# Patient Record
Sex: FEMALE | Race: BLACK OR AFRICAN AMERICAN | Employment: FULL TIME | URBAN - METROPOLITAN AREA
[De-identification: names, ages, dates, MRNs, and addresses within clinical notes are randomized per-mention and may not be internally consistent; named-entity substitution may affect disease eponyms.]

---

## 2017-03-15 ENCOUNTER — TELEPHONE (OUTPATIENT)
Dept: CARDIOLOGY CLINIC | Age: 32
End: 2017-03-15

## 2017-03-15 NOTE — TELEPHONE ENCOUNTER
Called patient. Verified patient's identity with two identifiers. I told patient last office note includes diagnosis codes and medications. She said that was fine. Mailing Dr. Paul Hernandez last office note. Patient verbalizes understanding and denies further questions or concerns.

## 2017-03-15 NOTE — TELEPHONE ENCOUNTER
The patient requested a call back on [phone] regarding a note being sent to her employer stating her medical condition and the medication that she is currently taking. She stated that it can be sent to her address on file. Thanks!

## 2017-03-20 ENCOUNTER — TELEPHONE (OUTPATIENT)
Dept: CARDIOLOGY CLINIC | Age: 32
End: 2017-03-20

## 2017-03-20 NOTE — TELEPHONE ENCOUNTER
Patient called regarding picking up a letter for absence from work. Please call her when she can pick that up. She can be reached at [phone].  Thank you

## 2017-03-20 NOTE — TELEPHONE ENCOUNTER
I created a letter. Patient sent a [employer] software[employer] message saying she was doing to stop in the office today to pick it up. Patient picked up letter.

## 2017-03-28 ENCOUNTER — OFFICE VISIT (OUTPATIENT)
Dept: CARDIOLOGY CLINIC | Age: 32
End: 2017-03-28

## 2017-03-28 DIAGNOSIS — Z95.810 PRESENCE OF AUTOMATIC CARDIOVERTER/DEFIBRILLATOR (AICD): Primary | ICD-10-CM

## 2017-06-05 DIAGNOSIS — R00.2 PALPITATIONS: ICD-10-CM

## 2017-06-05 DIAGNOSIS — I10 ESSENTIAL HYPERTENSION: ICD-10-CM

## 2017-06-05 DIAGNOSIS — I10 ESSENTIAL HYPERTENSION WITH GOAL BLOOD PRESSURE LESS THAN 130/80: ICD-10-CM

## 2017-06-05 DIAGNOSIS — I42.0 CARDIOMYOPATHY, DILATED, NONISCHEMIC (HCC): ICD-10-CM

## 2017-06-05 RX ORDER — CARVEDILOL 6.25 MG/1
6.25 TABLET ORAL 2 TIMES DAILY WITH MEALS
Qty: 90 TAB | Refills: 2 | Status: CANCELLED | OUTPATIENT
Start: 2017-06-05

## 2017-06-06 RX ORDER — LOSARTAN POTASSIUM 100 MG/1
TABLET ORAL
Qty: 90 TAB | Refills: 2 | Status: SHIPPED | OUTPATIENT
Start: 2017-06-06 | End: 2018-06-29 | Stop reason: SDUPTHER

## 2017-06-06 RX ORDER — BUMETANIDE 2 MG/1
TABLET ORAL
Qty: 180 TAB | Refills: 2 | Status: SHIPPED | OUTPATIENT
Start: 2017-06-06 | End: 2018-06-29 | Stop reason: SDUPTHER

## 2017-06-06 RX ORDER — CARVEDILOL 25 MG/1
25 TABLET ORAL 2 TIMES DAILY WITH MEALS
Qty: 180 TAB | Refills: 2 | Status: SHIPPED | OUTPATIENT
Start: 2017-06-06 | End: 2018-06-29 | Stop reason: SDUPTHER

## 2017-06-06 NOTE — TELEPHONE ENCOUNTER
Requested Prescriptions     Signed Prescriptions Disp Refills    carvedilol (COREG) 25 mg tablet 180 Tab 2     Sig: Take 1 Tab by mouth two (2) times daily (with meals).      Authorizing Provider: Yannick Dunbar     Ordering User: Edil Hernandez losartan (COZAAR) 100 mg tablet 90 Tab 2     Sig: TAKE 1 TABLET BY MOUTH DAILY     Authorizing Provider: Yannick Dunbar     Ordering User: Rashida Serrano    bumetanide (BUMEX) 2 mg tablet 180 Tab 2     Sig: TAKE 1 TABLET BY MOUTH TWICE DAILY     Authorizing Provider: Yannick Dunbar     Ordering User: Rashida Serrano    Per Dr. Stephanie Ashton verbal order

## 2017-06-15 ENCOUNTER — DOCUMENTATION ONLY (OUTPATIENT)
Dept: CARDIOLOGY CLINIC | Age: 32
End: 2017-06-15

## 2017-06-15 NOTE — PROGRESS NOTES
(Single lead ICD)Device check shows 39 episodes of SVT since 3/28/2017  Rates mostly 150's bpm. Highest rate 179 bpm  Longest episode 5 minutes 40 seconds  Shortest episodes 10 seconds    Coreg increased to 32.5 mg bid   May need to come in to discuss EP study and ablation     Future Appointments  Date Time Provider Shanae Alicia   7/5/2017 9:15 AM [facility], [address]   10/9/2017 3:15 PM [address] [place] [address], [address]   10/9/2017 3:20 PM Jean Paul Bartholomew  [address]

## 2017-06-19 NOTE — PROGRESS NOTES
Verified patient with two types of identifiers. States that she is having more increase in tachy and would like to see Dr Geovany Crowley to discuss the next option. Made appt with Dr Geovany Crowley 6/26/17. Patient verbalizes understanding. And will call with any questions or concerns.

## 2017-06-26 ENCOUNTER — OFFICE VISIT (OUTPATIENT)
Dept: CARDIOLOGY CLINIC | Age: 32
End: 2017-06-26

## 2017-06-26 VITALS
WEIGHT: 216.8 LBS | DIASTOLIC BLOOD PRESSURE: 90 MMHG | HEIGHT: 61 IN | HEART RATE: 60 BPM | BODY MASS INDEX: 40.93 KG/M2 | RESPIRATION RATE: 16 BRPM | SYSTOLIC BLOOD PRESSURE: 130 MMHG

## 2017-06-26 DIAGNOSIS — I42.0 CARDIOMYOPATHY, DILATED, NONISCHEMIC (HCC): ICD-10-CM

## 2017-06-26 DIAGNOSIS — Z95.810 SINGLE IMPLANTABLE CARDIOVERTER-DEFIBRILLATOR IN SITU: ICD-10-CM

## 2017-06-26 DIAGNOSIS — E66.01 OBESITY, CLASS III, BMI 40-49.9 (MORBID OBESITY) (HCC): ICD-10-CM

## 2017-06-26 DIAGNOSIS — I47.1 SVT (SUPRAVENTRICULAR TACHYCARDIA) (HCC): Primary | ICD-10-CM

## 2017-06-26 DIAGNOSIS — I10 ESSENTIAL HYPERTENSION: ICD-10-CM

## 2017-06-26 DIAGNOSIS — I34.0 MITRAL VALVE INSUFFICIENCY, UNSPECIFIED ETIOLOGY: ICD-10-CM

## 2017-06-26 DIAGNOSIS — R00.2 PALPITATIONS: ICD-10-CM

## 2017-06-26 NOTE — PROGRESS NOTES
Subjective:      Karley Avery is a 27 y.o. female who is seen today to discuss frequent episodes of SVT noted on her ICD. She says she sometimes feels her heart racing when it is going fast. She is already on the max dose of Coreg. She denies recently hospitalizations. She recently had labs done with her new PCP. Most recent device check shows (Single lead ICD) 39 episodes of SVT since 3/28/2017  Rates mostly 150's bpm. highest rate 179 bpm  Longest episode 5 minutes 40 seconds  Shortest episodes 10 seconds  These are not during time she exercises  She says she can do most things, just let her brother carry grocery    In the past:    She had ICD done because of syncope with cardiomyopathy  The patient was diagnosed with postpartum cardiomyopathy in 2008. She has been seen by Dr. Zuleima Euceda but wanted to change cardiology practice and has seen Dr. Mani Barnes. NYHA class II.    Echo 11/2014 EF 15%, moderate MR, [place].moderate to severe TR  She had ICD lead fractured (she was not compliant and followed up regularly at the time) and lead was extracted and reimplanted by me    Problem List  Date Reviewed: 6/26/2017          Codes Class Noted    ICD (implantable cardioverter-defibrillator) lead failure ICD-10-CM: T82.110A  ICD-9-CM: 996.04  2/2/2016    Overview Signed 2/3/2016  6:21 AM by Jinny Garcia MD     ICD lead extraction 2/2/2016  Reimplant new ICD lead  General anesthesia             Tricuspid inspiratory pansystolic murmur [id number]: I07.9  ICD-9-CM: 785.2  10/17/2013        Pulmonary hypertension ([address] ICD-10-CM: I27.2  ICD-9-CM: 416.8  10/17/2013        Headache ICD-10-CM: R51  ICD-9-CM: 784.0  4/17/2013        Abnormal EKG ICD-10-CM: R94.31  ICD-9-CM: 794.31  11/19/2012        Mitral regurgitation ICD-10-CM: I34.0  ICD-9-CM: 424.0  11/19/2012        BP (high blood pressure) ICD-10-CM: I10  ICD-9-CM: 401.9  7/30/2012        Single implantable cardioverter-defibrillator in situ ICD-10-CM: Z95.810  ICD-9-CM: V45.02  2/1/2012    Overview Signed 2/1/2012  7:00 AM by MD Vinnie Nunez John single lead single coil AICD implant 1/30/2012             Cardiomyopathy, dilated, nonischemic (HCC) ICD-10-CM: I42.9  ICD-9-CM: 425.4  Unknown        Morbid obesity ([address] ICD-10-CM: E66.01  ICD-9-CM: 278.01  1/17/2012        Observed sleep apnea ICD-10-CM: G47.30  ICD-9-CM: 780.57  1/17/2012        Mitral valve disorder ICD-10-CM: I05.9  ICD-9-CM: 394.9  1/6/2012        Palpitations ICD-10-CM: R00.2  ICD-9-CM: 785.1  12/22/2011        Syncope and collapse ICD-10-CM: R55  ICD-9-CM: 780.2  12/22/2011        Cardiomyopathy, peripartum, postpartum ICD-10-CM: O90.3  ICD-9-CM: 674.54  12/22/2011        Heart murmur ICD-10-CM: R01.1  ICD-9-CM: 785.2  12/22/2011                Current Outpatient Prescriptions   Medication Sig Dispense Refill    carvedilol (COREG) 25 mg tablet Take 1 Tab by mouth two (2) times daily (with meals). 180 Tab 2    losartan (COZAAR) 100 mg tablet TAKE 1 TABLET BY MOUTH DAILY 90 Tab 2    bumetanide (BUMEX) 2 mg tablet TAKE 1 TABLET BY MOUTH TWICE DAILY 180 Tab 2    carvedilol (COREG) 6.25 mg tablet Take 1 Tab by mouth two (2) times daily (with meals). 90 Tab 2    ferrous sulfate (IRON) 325 mg (65 mg iron) tablet Take  by mouth Daily (before breakfast).  b complex vitamins Liqd Take  by mouth daily.  multivitamin (ONE A DAY) tablet Take 1 Tab by mouth daily.  Calcium-Cholecalciferol, D3, (CALCIUM 600 WITH VITAMIN D3) 600 mg(1,500mg) -400 unit Chew Take  by mouth two (2) times a day.        Allergies   Allergen Reactions    Bee Sting [Sting, Bee] Anaphylaxis    Lisinopril Cough     Past Medical History:   Diagnosis Date    Arrhythmia     BRADYCARDIA    Cardiomyopathy, dilated, nonischemic (HCC)     Congestive heart failure, unspecified     Essential hypertension     [facility] failure [facility]     Hypertension     ICD (implantable cardiac defibrillator) in place     [facility] dual coil, single lead    Murmur     Syncope     Syncope and collapse 2011    TIA (transient ischemic attack)     Valvular heart disease      Past Surgical History:   Procedure Laterality Date    HX  SECTION      HX CHOLECYSTECTOMY      HX GASTRIC BYPASS      HX PACEMAKER      DEFIBRILLATOR    INS PPM/ICD LED SING ONLY  2012         BELLO DOPPLER  2013          No family history of sudden death. Social History   Substance Use Topics    Smoking status: Never Smoker    Smokeless tobacco: Never Used    Alcohol use No      Comment: RARE OCC      Review of Systems  Constitutional: Negative for fever, chills   HEENT: Negative for nosebleeds, acute vision changes. Respiratory: Negative for cough, hemoptysis, sputum production, and wheezing. Cardiovascular: Negative for orthopnea, claudication, PND. + occasional palpitations. Gastrointestinal: Negative for nausea, vomiting, blood in stool and melena. Genitourinary: Negative for dysuria, hematuria. Musculoskeletal: Negative for myalgias. Skin: Negative for rash and itching. Heme: Does not bleed or bruise easily. Neurological: Negative for speech change and focal weakness      Objective:     Visit Vitals    /90 (BP 1 Location: Left arm, BP Patient Position: Sitting)    Pulse 60    Resp 16    Ht 5' 1\" (1.549 m)    Wt 216 lb 12.8 oz (98.3 kg)    BMI 40.96 kg/m2      Physical Exam:   General:  alert, cooperative, no distress, appears stated age    Neck: carotids upstroke normal bilaterally, no bruits, no JVD   Chest Wall: inspection show large breasts and upper body fat, respiratory effort normal   Lung: clear to auscultation bilaterally   Heart:  normal rate and regular rhythm, hard to hear murmur, large breasts   Abdomen: Soft, moderate obesity   Extremities: No leg edema             Skin: ICD site on the left shows keloid scar, no redness,          Assessment/Plan:       ICD-10-CM ICD-9-CM    1.  SVT (supraventricular tachycardia) ([facility]) I47.1 427.89    2. Palpitations R00.2 785.1    3. Cardiomyopathy, dilated, nonischemic ([facility]) I42.9 425.4    4. Single implantable cardioverter-defibrillator in situ Z95.810 V45.02    5. Mitral valve insufficiency, unspecified etiology I34.0 424.0    6. Essential hypertension I10 401.9    7. Obesity, Class III, BMI 40-49.9 (morbid obesity) ([facility]) E66.01 278.01       Reviewed device check findings with her. Discussed EP study ablation and SVT ablation, right now she says she is not that symptomatic and will hold off on the EP study for right now. If she does become more symptomatic then she will set up the EP study and ablation. Last year she had mildly abnormal TSH but normal T4. I would like to recheck it but she said call her PCP's office to see if he has done one  She is on GDMT, BP is tolerating the increased dose of Coreg. reviewed diet, exercise and weight control  reviewed medications and side effects in detail  She is working out, her goal is to lose another 30 lbs. Follow-up Disposition:  Return in about 6 months (around 12/26/2017). [place] Her M.D.  [facility]  Electrophysiology/Cardiology  [facility] [place]  [address] , [address]  [phone]

## 2017-06-26 NOTE — PROGRESS NOTES
Chief Complaint   Patient presents with    SVT     recently on remote. Occasional chest sharpness with elevated heart rate/dizziness. Denies shortness of breath/swelling. Labwork done per PCP.

## 2017-06-26 NOTE — PROGRESS NOTES
Subjective:      Ninfa Painter is a 27 y.o. female who is seen today to discuss frequent episodes of SVT noted on her ICD. She says she sometimes feels her heart racing when it is going fast. She is already on the max dose of Coreg. She denies recently hospitalizations. She recently had labs done with her new PCP. Most recent device check shows (Single lead ICD)Device check shows 39 episodes of SVT since 3/28/2017  Rates mostly 150's bpm. Highest rate 179 bpm  Longest episode 5 minutes 40 seconds  Shortest episodes 10 seconds      She had ICD done because of syncope with cardiomyopathy  The patient was diagnosed with postpartum cardiomyopathy in 2008. She has been seen by Dr. John Gomez but wanted to change cardiology practice and has seen Dr. Farzana Musa. NYHA class II.    Echo 11/2014 EF 15%, moderate MR, [place].moderate to severe TR  She had ICD lead fractured (she was not compliant and followed up regularly at the time) and lead was extracted and reimplanted by me            Problem List  Date Reviewed: 6/26/2017          Codes Class Noted    ICD (implantable cardioverter-defibrillator) lead failure ICD-10-CM: T82.110A  ICD-9-CM: 996.04  2/2/2016    Overview Signed 2/3/2016  6:21 AM by Montey Schirmer, MD     ICD lead extraction 2/2/2016  Reimplant new ICD lead  General anesthesia             Tricuspid inspiratory pansystolic murmur [id number]: I07.9  ICD-9-CM: 785.2  10/17/2013        Pulmonary hypertension ([address] ICD-10-CM: I27.2  ICD-9-CM: 416.8  10/17/2013        Headache ICD-10-CM: R51  ICD-9-CM: 784.0  4/17/2013        Abnormal EKG ICD-10-CM: R94.31  ICD-9-CM: 794.31  11/19/2012        Mitral regurgitation ICD-10-CM: I34.0  ICD-9-CM: 424.0  11/19/2012        BP (high blood pressure) ICD-10-CM: I10  ICD-9-CM: 401.9  7/30/2012        Single implantable cardioverter-defibrillator in situ ICD-10-CM: Z95.810  ICD-9-CM: V45.02  2/1/2012    Overview Signed 2/1/2012  7:00 AM by Montey Schirmer, MD [facility] single lead single coil AICD implant 1/30/2012             Cardiomyopathy, dilated, nonischemic (HCC) ICD-10-CM: I42.9  ICD-9-CM: 425.4  Unknown        Morbid obesity ([facility] ICD-10-CM: E66.01  ICD-9-CM: 278.01  1/17/2012        Observed sleep apnea ICD-10-CM: G47.30  ICD-9-CM: 780.57  1/17/2012        Mitral valve disorder ICD-10-CM: I05.9  ICD-9-CM: 394.9  1/6/2012        Palpitations ICD-10-CM: R00.2  ICD-9-CM: 785.1  12/22/2011        Syncope and collapse ICD-10-CM: R55  ICD-9-CM: 780.2  12/22/2011        Cardiomyopathy, peripartum, postpartum ICD-10-CM: O90.3  ICD-9-CM: 674.54  12/22/2011        Heart murmur ICD-10-CM: R01.1  ICD-9-CM: 785.2  12/22/2011                Current Outpatient Prescriptions   Medication Sig Dispense Refill    carvedilol (COREG) 25 mg tablet Take 1 Tab by mouth two (2) times daily (with meals). 180 Tab 2    losartan (COZAAR) 100 mg tablet TAKE 1 TABLET BY MOUTH DAILY 90 Tab 2    bumetanide (BUMEX) 2 mg tablet TAKE 1 TABLET BY MOUTH TWICE DAILY 180 Tab 2    carvedilol (COREG) 6.25 mg tablet Take 1 Tab by mouth two (2) times daily (with meals). 90 Tab 2    ferrous sulfate (IRON) 325 mg (65 mg iron) tablet Take  by mouth Daily (before breakfast).  b complex vitamins Liqd Take  by mouth daily.  multivitamin (ONE A DAY) tablet Take 1 Tab by mouth daily.  Calcium-Cholecalciferol, D3, (CALCIUM 600 WITH VITAMIN D3) 600 mg(1,500mg) -400 unit Chew Take  by mouth two (2) times a day.        Allergies   Allergen Reactions    Bee Sting [Sting, Bee] Anaphylaxis    Lisinopril Cough     Past Medical History:   Diagnosis Date    Arrhythmia     BRADYCARDIA    Cardiomyopathy, dilated, nonischemic (HCC)     Congestive heart failure, unspecified     Essential hypertension     Heart failure ([facility]     Hypertension     ICD (implantable cardiac defibrillator) in place     [facility] dual coil, single lead    Murmur     Syncope     Syncope and collapse 12/22/2011    TIA (transient ischemic attack)     Valvular heart disease      Past Surgical History:   Procedure Laterality Date    HX  SECTION      HX CHOLECYSTECTOMY      HX GASTRIC BYPASS      HX PACEMAKER      DEFIBRILLATOR    INS PPM/ICD LED SING ONLY  2012         BELLO DOPPLER  2013          No family history of sudden death. Social History   Substance Use Topics    Smoking status: Never Smoker    Smokeless tobacco: Never Used    Alcohol use No      Comment: RARE OCC      Review of Systems  Constitutional: Negative for fever, chills   HEENT: Negative for nosebleeds, acute vision changes. Respiratory: Negative for cough, hemoptysis, sputum production, and wheezing. Cardiovascular: Negative for orthopnea, claudication, PND. + occasional palpitations. Gastrointestinal: Negative for nausea, vomiting, blood in stool and melena. Genitourinary: Negative for dysuria, hematuria. Musculoskeletal: Negative for myalgias. Skin: Negative for rash and itching. Heme: Does not bleed or bruise easily. Neurological: Negative for speech change and focal weakness      Objective:     Visit Vitals    /90 (BP 1 Location: Left arm, BP Patient Position: Sitting)    Pulse 60    Resp 16    Ht 5' 1\" (1.549 m)    Wt 216 lb 12.8 oz (98.3 kg)    BMI 40.96 kg/m2      Physical Exam:   General:  alert, cooperative, no distress, appears stated age    Neck: carotids upstroke normal bilaterally, no bruits, no JVD   Chest Wall: inspection show large breasts and upper body fat, respiratory effort normal   Lung: clear to auscultation bilaterally   Heart:  normal rate and regular rhythm,   Abdomen: soft   Extremities: No Bilateral leg edema             Skin: ICD site on the left shows keloid scar, no redness,          Assessment/Plan:       ICD-10-CM ICD-9-CM    1. SVT (supraventricular tachycardia) ([facility]) I47.1 427.89    2. Palpitations R00.2 785.1    3. Cardiomyopathy, dilated, nonischemic ([facility]) I42.9 425.4    4.  Single implantable cardioverter-defibrillator in situ Z95.810 V45.02    5. Mitral valve insufficiency, unspecified etiology I34.0 424.0    6. Essential hypertension I10 401.9    7. Obesity, Class III, BMI 40-49.9 (morbid obesity) ([facility]) E66.01 278.01       Reviewed device check findings with her. Discussed EP study ablation and SVT ablation, right now she says she is not that symptomatic and will hold off on the EP study for right now. If she does become more symptomatic then she will set up the EP study and ablation. She is on GDMT, BP is tolerating the increased dose of Coreg. reviewed diet, exercise and weight control  reviewed medications and side effects in detail  She is working out, her goal is to lose another 30 lbs. Follow-up Disposition: Not on Karla Mujica M.D.  [facility]  Electrophysiology/Cardiology  [facility] [place]  [address] [facility]  [address] [facility], [address] [place] [address]  [phone]

## 2017-06-26 NOTE — MR AVS SNAPSHOT
Visit Information Date & Time Provider Department Dept. Phone Encounter #  
 6/26/2017  4:00 PM Hue Us MD [facility] [phone] [id number] Your Appointments 10/12/2017  2:15 PM  
PACEMAKER with CARLIN MARQUEZ [facility] ([place] SCHEDULING) Appt Note: sjm icd, rc annual thresh/M  see Sandoval  $0CP rosario 9/15/16; sjm icd, rc annual thresh/M see Sandoval $0CP rosario 9/15/16  
 [address]  
[address] [facility]  
  
    
 10/12/2017  2:20 PM  
ESTABLISHED PATIENT with Hue Us MD  
[facility] ([facility]) Appt Note: sjm icd, rc annual thresh/M see Sandoval $0CP rosario 9/15/16  
 [address] [address]  
[address] [place] [address]  
  
    
  
 7/5/2017  9:15 AM  
REMOTE OFFICE VISIT with Bruce Villarreal [facility] ([place] SCHEDULING) Appt Note: sjm icd, rc  
 [address]  
[address] [address] Upcoming Health Maintenance Date Due DTaP/Tdap/Td series (1 - Tdap) 8/24/2005 PAP AKA CERVICAL CYTOLOGY 8/24/2005 INFLUENZA AGE 9 TO ADULT 8/1/2017 Allergies as of 6/26/2017  Review Complete On: 6/26/2017 By: Hue Us MD  
  
 Severity Noted Reaction Type Reactions Bee Sting [Sting, Bee] High 11/01/2012    Anaphylaxis Lisinopril  01/25/2012   Side Effect Cough Current Immunizations  Reviewed on 2/2/2016 Name Date Influenza Vaccine Split 11/1/2011 Pneumococcal Vaccine (Unspecified Type) 11/1/2011 Not reviewed this visit You Were Diagnosed With   
  
 Codes Comments SVT (supraventricular tachycardia) ([facility]    -  Primary ICD-10-CM: I47.1 ICD-9-CM: 427.89   
 Palpitations     ICD-10-CM: R00.2 ICD-9-CM: 785.1 Cardiomyopathy, dilated, nonischemic (HCC)     ICD-10-CM: I42.9 ICD-9-CM: 425.4 Single implantable cardioverter-defibrillator in situ     ICD-10-CM: Z95.810 ICD-9-CM: V45.02 Mitral valve insufficiency, unspecified etiology     ICD-10-CM: I34.0 ICD-9-CM: 424.0 Essential hypertension     ICD-10-CM: I10 
ICD-9-CM: 401.9 Obesity, Class III, BMI 40-49.9 (morbid obesity) (HCC)     ICD-10-CM: E66.01 
ICD-9-CM: 278.01 Vitals BP Pulse Resp Height(growth percentile) Weight(growth percentile) BMI  
 130/90 (BP 1 Location: Left arm, BP Patient Position: Sitting) 60 16 5' 1\" (1.549 m) 216 lb 12.8 oz (98.3 kg) 40.96 kg/m2 OB Status Smoking Status IUD Never Smoker BMI and BSA Data Body Mass Index Body Surface Area 40.96 kg/m 2 2.06 m 2 Preferred Pharmacy Pharmacy Name Phone [place] [phone] [facility], [address] [place] [address] [phone] Your Updated Medication List  
  
   
This list is accurate as of: 6/26/17  5:26 PM.  Always use your most recent med list.  
  
  
  
  
 b complex vitamins Liqd Take  by mouth daily. bumetanide 2 mg tablet Commonly known as:  Torres Abide TAKE 1 TABLET BY MOUTH TWICE DAILY CALCIUM 600 WITH VITAMIN D3 600 mg(1,500mg) -400 unit Chew Generic drug:  Calcium-Cholecalciferol (D3) Take  by mouth two (2) times a day. * carvedilol 6.25 mg tablet Commonly known as:  Esaw Math Take 1 Tab by mouth two (2) times daily (with meals). * carvedilol 25 mg tablet Commonly known as:  Esaw Math Take 1 Tab by mouth two (2) times daily (with meals). Iron 325 mg (65 mg iron) tablet Generic drug:  ferrous sulfate Take  by mouth Daily (before breakfast). losartan 100 mg tablet Commonly known as:  COZAAR  
TAKE 1 TABLET BY MOUTH DAILY  
  
 multivitamin tablet Commonly known as:  ONE A DAY  
 Take 1 Tab by mouth daily. * Notice: This list has 2 medication(s) that are the same as other medications prescribed for you. Read the directions carefully, and ask your doctor or other care provider to review them with you. Introducing [facility] SERVICES! Dear Linda: 
Thank you for requesting a [employer] account. Our records indicate that you already have an active [employer] account. You can access your account anytime at [email or web address] Did you know that you can access your hospital and ER discharge instructions at any time in [employer]? You can also review all of your test results from your hospital stay or ER visit. Additional Information If you have questions, please visit the Frequently Asked Questions section of the [employer] website at [email or web address] Remember, [employer] is NOT to be used for urgent needs. For medical emergencies, dial 911. Now available from your iPhone and Android! Please provide this summary of care documentation to your next provider. Your primary care clinician is listed as Dany Bahena [facility]. If you have any questions after today's visit, please call [phone].

## 2017-06-26 NOTE — LETTER
NOTIFICATION RETURN TO WORK / SCHOOL 
 
6/26/2017 5:29 PM 
 
Ms. Brian Gary [address] [facility] [address] To Whom It May Concern: 
 
Brian Gary is currently under the care of [address] Nicolette Issa at our office for an appointment: 6/26/17 If there are questions or concerns please have the patient contact our office.  
 
 
 
Sincerely, 
 
 
Jeffry Sandoval MD

## 2017-06-27 ENCOUNTER — TELEPHONE (OUTPATIENT)
Dept: CARDIOLOGY CLINIC | Age: 32
End: 2017-06-27

## 2017-06-29 ENCOUNTER — TELEPHONE (OUTPATIENT)
Dept: CARDIOLOGY CLINIC | Age: 32
End: 2017-06-29

## 2017-06-29 DIAGNOSIS — R94.31 ABNORMAL EKG: ICD-10-CM

## 2017-06-29 DIAGNOSIS — I05.9 MITRAL VALVE DISORDER: ICD-10-CM

## 2017-06-29 NOTE — TELEPHONE ENCOUNTER
----- Message from Siddharth Calabrese NP sent at 6/28/2017  8:27 AM EDT -----  Labs 6/24/17  Normal renal function  Normal LFTs  K WnL  LDL 79    Please order thyroid panel, hx of abnormal TSH

## 2017-06-29 NOTE — LETTER
7/31/2017 8:32 AM 
 
Ms. Karley Avery [address] [facility] [address] Dear Karley Avery: 
 
Please find your most recent results below. Resulted Orders THYROID PANEL W/TSH Result Value Ref Range TSH 2.090 0.450 - 4.500 uIU/mL T4, Total 6.3 4.5 - 12.0 ug/dL  
 T3 Uptake 30 24 - 39 % Free Thyroxine Index 1.9 1.2 - 4.9 Narrative Performed at:  [address]  [id number] : Zak Marshall MD, Phone:  [phone] RECOMMENDATIONS: 
 
 
Please call me if you have any questions: [phone] Sincerely,

## 2017-07-05 ENCOUNTER — OFFICE VISIT (OUTPATIENT)
Dept: CARDIOLOGY CLINIC | Age: 32
End: 2017-07-05

## 2017-07-05 DIAGNOSIS — Z95.810 PRESENCE OF AUTOMATIC CARDIOVERTER/DEFIBRILLATOR (AICD): Primary | ICD-10-CM

## 2017-07-26 LAB
FT4I SERPL CALC-MCNC: 1.9 (ref 1.2–4.9)
T3RU NFR SERPL: 30 % (ref 24–39)
T4 SERPL-MCNC: 6.3 UG/DL (ref 4.5–12)
TSH SERPL DL<=0.005 MIU/L-ACNC: 2.09 UIU/ML (ref 0.45–4.5)

## 2017-07-31 ENCOUNTER — TELEPHONE (OUTPATIENT)
Dept: CARDIOLOGY CLINIC | Age: 32
End: 2017-07-31

## 2017-07-31 NOTE — TELEPHONE ENCOUNTER
Called patient. Verified patient's identity with two identifiers. Notified patient thyroid labs were normal. She asked what the next steps are. She mentioned having discussed procedure (which I see an ablation was discussed). I told her I would ask Dr. Jus Larry nurse and call her back. Her f/u with Dr. Mckenzie Fine is not until October, but can make an earlier appointment if you think it's necessary. Please advise. Thanks.     Future Appointments  Date Time Provider Shanae Vargas   10/12/2017 2:15 PM [address] [place] [address], [address] [facility]   10/12/2017 2:20 PM Keshawn Alvarez  [address]

## 2017-07-31 NOTE — TELEPHONE ENCOUNTER
Called patient. Notified her of Ivis's message. Patient verbalizes understanding and denies further questions or concerns.

## 2017-07-31 NOTE — TELEPHONE ENCOUNTER
Follow up as scheduled unless she becomes more symptomatic then she can set up the EP study and ablation.

## 2018-01-04 ENCOUNTER — TELEPHONE (OUTPATIENT)
Dept: CARDIOLOGY CLINIC | Age: 33
End: 2018-01-04

## 2018-01-04 NOTE — TELEPHONE ENCOUNTER
Spoke to Dr Damien Gonzalez in regards to [facility] paperwork. Patient states that she needs the paper work filled out due to days she has issues with side effects from her medications. Advised patient that we do not do FMLA for this. Per Dr Damien Gonzalez he will need to see to discuss. Will discuss at her upcoming appt 2/15/18. Patient also has appointment with Dr Daxa Villa 1/10/18 who can also fill out if he feels she needs this. My chart message sent to patient advising her of this.

## 2018-01-10 ENCOUNTER — TELEPHONE (OUTPATIENT)
Dept: CARDIOLOGY CLINIC | Age: 33
End: 2018-01-10

## 2018-01-10 ENCOUNTER — OFFICE VISIT (OUTPATIENT)
Dept: CARDIOLOGY CLINIC | Age: 33
End: 2018-01-10

## 2018-01-10 VITALS
HEIGHT: 61 IN | BODY MASS INDEX: 42.78 KG/M2 | SYSTOLIC BLOOD PRESSURE: 132 MMHG | DIASTOLIC BLOOD PRESSURE: 86 MMHG | WEIGHT: 226.6 LBS

## 2018-01-10 DIAGNOSIS — Z95.810 SINGLE IMPLANTABLE CARDIOVERTER-DEFIBRILLATOR IN SITU: ICD-10-CM

## 2018-01-10 DIAGNOSIS — I47.1 SVT (SUPRAVENTRICULAR TACHYCARDIA) (HCC): ICD-10-CM

## 2018-01-10 DIAGNOSIS — I10 ESSENTIAL HYPERTENSION: ICD-10-CM

## 2018-01-10 DIAGNOSIS — I42.0 CARDIOMYOPATHY, DILATED, NONISCHEMIC (HCC): Primary | ICD-10-CM

## 2018-01-10 DIAGNOSIS — I05.9 MITRAL VALVE DISORDER: ICD-10-CM

## 2018-01-10 DIAGNOSIS — I07.9: ICD-10-CM

## 2018-01-10 NOTE — MR AVS SNAPSHOT
Visit Information Date & Time Provider Department Dept. Phone Encounter #  
 1/10/2018  8:40 AM Bunny Lagunas MD [facility] Geoff Horowitz [phone] [id number] Your Appointments 2/15/2018  2:00 PM  
ESTABLISHED PATIENT with Carolann Rhoades MD  
[facility] ([address]) Appt Note: .  
 [address]  
[address] [place] [address]  
  
    
 2/15/2018  2:30 PM  
PACEMAKER with Sanjuana Jimenez [facility] ([facility] SCHEDULING) Appt Note: sjm icd, rc annual thresh/M  see [facility]  $0CP rosario 9/15/16; sjm icd, rc annual thresh/M see [facility] $0CP rosario 9/15/16  b 7-5-17; .  
 [address]  
[address] [facility],[address] [facility] 7 [address] Upcoming Health Maintenance Date Due DTaP/Tdap/Td series (1 - Tdap) 8/24/2005 PAP AKA CERVICAL CYTOLOGY 8/24/2005 Influenza Age 5 to Adult 8/1/2017 Allergies as of 1/10/2018  Review Complete On: 1/10/2018 By: Jackson Simental RN Severity Noted Reaction Type Reactions Bee Sting [Sting, Bee] High 11/01/2012    Anaphylaxis Lisinopril  01/25/2012   Side Effect Cough Current Immunizations  Reviewed on 2/2/2016 Name Date Influenza Vaccine Split 11/1/2011 ZZZ-RETIRED (DO NOT USE) Pneumococcal Vaccine (Unspecified Type) 11/1/2011 Not reviewed this visit You Were Diagnosed With   
  
 Codes Comments Cardiomyopathy, dilated, nonischemic (HCC)    -  Primary ICD-10-CM: I42.9 ICD-9-CM: 425.4 Vitals BP Height(growth percentile) Weight(growth percentile) BMI OB Status Smoking Status 132/86 (BP 1 Location: Left arm, BP Patient Position: Sitting) 5' 1\" (1.549 m) 226 lb 9.6 oz (102.8 kg) 42.82 kg/m2 IUD Never Smoker Vitals History BMI and BSA Data Body Mass Index Body Surface Area 42.82 kg/m 2 2.1 m 2 Preferred Pharmacy Pharmacy Name Phone [facility], [address] [facility] [phone] Your Updated Medication List  
  
   
This list is accurate as of: 1/10/18 11:53 AM.  Always use your most recent med list.  
  
  
  
  
 b complex vitamins Liqd Take  by mouth daily. bumetanide 2 mg tablet Commonly known as:  Concepción Geogre TAKE 1 TABLET BY MOUTH TWICE DAILY CALCIUM 600 WITH VITAMIN D3 600 mg(1,500mg) -400 unit Chew Generic drug:  Calcium-Cholecalciferol (D3) Take  by mouth two (2) times a day. * carvedilol 6.25 mg tablet Commonly known as:  Naomie Denise Take 1 Tab by mouth two (2) times daily (with meals). * carvedilol 25 mg tablet Commonly known as:  Manus Denise Take 1 Tab by mouth two (2) times daily (with meals). Iron 325 mg (65 mg iron) tablet Generic drug:  ferrous sulfate Take  by mouth Daily (before breakfast). losartan 100 mg tablet Commonly known as:  COZAAR  
TAKE 1 TABLET BY MOUTH DAILY  
  
 multivitamin tablet Commonly known as:  ONE A DAY Take 1 Tab by mouth daily. * Notice: This list has 2 medication(s) that are the same as other medications prescribed for you. Read the directions carefully, and ask your doctor or other care provider to review them with you. We Performed the Following AMB POC EKG ROUTINE W/ 12 LEADS, INTER & REP [25504 CPT(R)] Introducing [facility] SERVICES! Dear Mi Thao: 
Thank you for requesting a Encision account. Our records indicate that you already have an active Encision account. You can access your account anytime at [email or web address]/YouStream Sport [email or web address] Did you know that you can access your hospital and ER discharge instructions at any time in Encision? You can also review all of your test results from your hospital stay or ER visit. Additional Information If you have questions, please visit the Frequently Asked Questions section of the Mela Artisans[employer] website at [email or web address] Remember, [employer] is NOT to be used for urgent needs. For medical emergencies, dial 911. Now available from your iPhone and Android! Please provide this summary of care documentation to your next provider. Your primary care clinician is listed as [facility]. If you have any questions after today's visit, please call [phone].

## 2018-01-10 NOTE — PROGRESS NOTES
Verified patient with two types of identifiers. Verified medications with the patient.     Verified patient's pharmacy

## 2018-01-10 NOTE — PROGRESS NOTES
HISTORY OF PRESENT ILLNESS  Stacey Bahena is a 34 y.o. female. She has peripartum cardiomyopathy and previously had gastric bypass surgery with a 100 pound weight loss. She has gained back 30 pounds. She also has a defibrillator in place. She has had significant tricuspid insufficiency in the past with a loud murmur that has recently disappeared. She has been having some runs of supraventricular tachycardia and needs to follow with  in this regard. 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 She has one child and is considering within the next several years having another child. HPI  Patient Active Problem List   Diagnosis Code    Palpitations R00.2    Syncope and collapse R55    Cardiomyopathy, peripartum, postpartum O90.3    Heart murmur R01.1    Mitral valve disorder I05.9    Morbid obesity (HCC) E66.01    Observed sleep apnea G47.30    Cardiomyopathy, dilated, nonischemic (HCC) I42.9    Single implantable cardioverter-defibrillator in situ Z95.810    BP (high blood pressure) I10    Abnormal EKG R94.31    Mitral regurgitation I34.0    Headache(784.0) R51    Tricuspid inspiratory pansystolic murmur V77.6    Pulmonary hypertension I27.20    ICD (implantable cardioverter-defibrillator) lead failure T82.110A     Current Outpatient Prescriptions   Medication Sig Dispense Refill    carvedilol (COREG) 25 mg tablet Take 1 Tab by mouth two (2) times daily (with meals). 180 Tab 2    losartan (COZAAR) 100 mg tablet TAKE 1 TABLET BY MOUTH DAILY 90 Tab 2    bumetanide (BUMEX) 2 mg tablet TAKE 1 TABLET BY MOUTH TWICE DAILY 180 Tab 2    carvedilol (COREG) 6.25 mg tablet Take 1 Tab by mouth two (2) times daily (with meals). 90 Tab 2    ferrous sulfate (IRON) 325 mg (65 mg iron) tablet Take  by mouth Daily (before breakfast).  b complex vitamins Liqd Take  by mouth daily.       multivitamin (ONE A DAY) tablet Take 1 Tab by mouth daily.  Calcium-Cholecalciferol, D3, (CALCIUM 600 WITH VITAMIN D3) 600 mg(1,500mg) -400 unit Chew Take  by mouth two (2) times a day. Past Medical History:   Diagnosis Date    Arrhythmia     BRADYCARDIA    Cardiomyopathy, dilated, nonischemic (HCC)     Congestive heart failure, unspecified     Essential hypertension     Heart failure ([address]     Hypertension     ICD (implantable cardiac defibrillator) in place     [place] dual coil, single lead    Murmur     Syncope     Syncope and collapse 2011    TIA (transient ischemic attack)     Valvular heart disease      Past Surgical History:   Procedure Laterality Date    HX  SECTION      HX CHOLECYSTECTOMY      HX GASTRIC BYPASS      HX PACEMAKER      DEFIBRILLATOR    INS PPM/ICD LED SING ONLY  2012         BELLO DOPPLER  2013            Review of Systems   Respiratory: Positive for shortness of breath. Cardiovascular: Positive for palpitations and leg swelling. All other systems reviewed and are negative. Visit Vitals    /86 (BP 1 Location: Left arm, BP Patient Position: Sitting)    Ht 5' 1\" (1.549 m)    Wt 226 lb 9.6 oz (102.8 kg)    BMI 42.82 kg/m2       Physical Exam   Constitutional: She is oriented to person, place, and time. She appears well-nourished. Eyes: Conjunctivae are normal.   Neck: Neck supple. Cardiovascular: Normal rate, regular rhythm and normal heart sounds. Exam reveals no gallop and no friction rub. No murmur heard. Pulmonary/Chest: Breath sounds normal. She has no wheezes. Abdominal: Bowel sounds are normal.   Musculoskeletal: She exhibits no edema. Neurological: She is oriented to person, place, and time. Skin: Skin is dry. Psychiatric: Her behavior is normal.   Nursing note and vitals reviewed. ASSESSMENT and PLAN  Clinically, she is doing fairly well.  We will try to assist her with documentation so that she does not have to work longer than 8 hour days for medical reasons. Since it has been almost 2 years since an echocardiogram was done, I will repeat this in the near future. I will also order lab work. She is scheduled to see  in the near future with regard to her palpitations and SVT. I will see her back in 3-4 months. I told her today that I did not think that she would be able to have another child in the future, but I did not remove all hope. I told her I would wait until I see her echocardiogram which should be done in the near future.

## 2018-01-11 LAB
ALBUMIN SERPL-MCNC: 4.1 G/DL (ref 3.5–5.5)
ALBUMIN/GLOB SERPL: 1.5 {RATIO} (ref 1.2–2.2)
ALP SERPL-CCNC: 96 IU/L (ref 39–117)
ALT SERPL-CCNC: 12 IU/L (ref 0–32)
AST SERPL-CCNC: 22 IU/L (ref 0–40)
BILIRUB SERPL-MCNC: 0.6 MG/DL (ref 0–1.2)
BUN SERPL-MCNC: 11 MG/DL (ref 6–20)
BUN/CREAT SERPL: 15 (ref 9–23)
CALCIUM SERPL-MCNC: 8.8 MG/DL (ref 8.7–10.2)
CHLORIDE SERPL-SCNC: 104 MMOL/L (ref 96–106)
CO2 SERPL-SCNC: 24 MMOL/L (ref 18–29)
CREAT SERPL-MCNC: 0.75 MG/DL (ref 0.57–1)
ERYTHROCYTE [DISTWIDTH] IN BLOOD BY AUTOMATED COUNT: 13.7 % (ref 12.3–15.4)
GLOBULIN SER CALC-MCNC: 2.8 G/DL (ref 1.5–4.5)
GLUCOSE SERPL-MCNC: 86 MG/DL (ref 65–99)
HCT VFR BLD AUTO: 37.8 % (ref 34–46.6)
HGB BLD-MCNC: 12.3 G/DL (ref 11.1–15.9)
MCH RBC QN AUTO: 28.7 PG (ref 26.6–33)
MCHC RBC AUTO-ENTMCNC: 32.5 G/DL (ref 31.5–35.7)
MCV RBC AUTO: 88 FL (ref 79–97)
PLATELET # BLD AUTO: 192 X10E3/UL (ref 150–379)
POTASSIUM SERPL-SCNC: 3.6 MMOL/L (ref 3.5–5.2)
PROT SERPL-MCNC: 6.9 G/DL (ref 6–8.5)
RBC # BLD AUTO: 4.28 X10E6/UL (ref 3.77–5.28)
SODIUM SERPL-SCNC: 143 MMOL/L (ref 134–144)
TSH SERPL DL<=0.005 MIU/L-ACNC: 2.01 UIU/ML (ref 0.45–4.5)
WBC # BLD AUTO: 5.5 X10E3/UL (ref 3.4–10.8)

## 2018-01-12 ENCOUNTER — CLINICAL SUPPORT (OUTPATIENT)
Dept: CARDIOLOGY CLINIC | Age: 33
End: 2018-01-12

## 2018-01-12 ENCOUNTER — TELEPHONE (OUTPATIENT)
Dept: CARDIOLOGY CLINIC | Age: 33
End: 2018-01-12

## 2018-01-12 DIAGNOSIS — I42.0 CARDIOMYOPATHY, DILATED, NONISCHEMIC (HCC): ICD-10-CM

## 2018-01-12 DIAGNOSIS — I42.0 DILATED CARDIOMYOPATHY (HCC): Primary | ICD-10-CM

## 2018-01-12 NOTE — TELEPHONE ENCOUNTER
----- Message from Sindy Gruber MD sent at 1/12/2018 10:41 AM EST -----  Labs normal  ----- Message -----     From: Gloria Juares Lab Results In     Sent: 1/11/2018   8:22 AM       To: Sindy Gruber MD

## 2018-01-12 NOTE — TELEPHONE ENCOUNTER
Called patient. Verified patient's identity with two identifiers. Notified patient of results. Patient also mentioned she is coming in today for an echo and needs a letter saying she has appointment today and had one yesterday. I told her I would have it ready for her. Patient verbalizes understanding and denies further questions or concerns.

## 2018-01-16 ENCOUNTER — TELEPHONE (OUTPATIENT)
Dept: CARDIOLOGY CLINIC | Age: 33
End: 2018-01-16

## 2018-01-16 NOTE — TELEPHONE ENCOUNTER
Called patient. Verified patient's identity with two identifiers. Notified patient Dr. Austin Castrejon said her echo showed that her heart is stable and the leaky valve is better. Patient requested I mail report. I told her I would. Patient verbalizes understanding and denies further questions or concerns.        Future Appointments  Date Time Provider Shanae Vargas   2/15/2018 2:00 PM Lauren Christina  [address]   2/15/2018 2:30 PM [address] [place] [address], [address] [facility]   4/10/2018 8:20 AM Eber Low  [address]

## 2018-02-15 ENCOUNTER — OFFICE VISIT (OUTPATIENT)
Dept: CARDIOLOGY CLINIC | Age: 33
End: 2018-02-15

## 2018-02-15 ENCOUNTER — CLINICAL SUPPORT (OUTPATIENT)
Dept: CARDIOLOGY CLINIC | Age: 33
End: 2018-02-15

## 2018-02-15 VITALS
BODY MASS INDEX: 42.41 KG/M2 | RESPIRATION RATE: 18 BRPM | SYSTOLIC BLOOD PRESSURE: 116 MMHG | WEIGHT: 224.6 LBS | OXYGEN SATURATION: 98 % | HEIGHT: 61 IN | HEART RATE: 64 BPM | DIASTOLIC BLOOD PRESSURE: 70 MMHG

## 2018-02-15 DIAGNOSIS — Z01.812 PRE-PROCEDURE LAB EXAM: ICD-10-CM

## 2018-02-15 DIAGNOSIS — I42.0 DILATED CARDIOMYOPATHY (HCC): ICD-10-CM

## 2018-02-15 DIAGNOSIS — I34.0 MITRAL VALVE INSUFFICIENCY, UNSPECIFIED ETIOLOGY: ICD-10-CM

## 2018-02-15 DIAGNOSIS — I10 ESSENTIAL HYPERTENSION: ICD-10-CM

## 2018-02-15 DIAGNOSIS — Z95.810 PRESENCE OF AUTOMATIC CARDIOVERTER/DEFIBRILLATOR (AICD): Primary | ICD-10-CM

## 2018-02-15 DIAGNOSIS — I47.1 SVT (SUPRAVENTRICULAR TACHYCARDIA) (HCC): ICD-10-CM

## 2018-02-15 DIAGNOSIS — Z95.810 SINGLE IMPLANTABLE CARDIOVERTER-DEFIBRILLATOR IN SITU: Primary | ICD-10-CM

## 2018-02-15 DIAGNOSIS — I47.1 PAROXYSMAL SVT (SUPRAVENTRICULAR TACHYCARDIA) (HCC): ICD-10-CM

## 2018-02-15 NOTE — MR AVS SNAPSHOT
[address] [facility] [address] 
[phone] Patient: Lucy Arroyo MRN: [id number] CM Visit Information Date & Time Provider Department Dept. Phone Encounter #  
 2/15/2018  2:00 PM Kylie Taylor MD [facility] Nel [facility] [phone] [id number] Follow-up Instructions Return in about 1 year (around 2/15/2019). Follow-up and Disposition History Your Appointments 2/15/2018  2:00 PM  
ESTABLISHED PATIENT with Kylie Taylor MD  
[facility] ([address]) Appt Note: pt r/s from 10/12 to 2/15/18  
 [address]  
[address] [address]  
  
    
 2/15/2018  2:30 PM  
PACEMAKER with Kyung Espino [facility] ([facility] SCHEDULING) Appt Note: sjm icd, rc annual thresh/M  see Josefa Isidroo  $0CP rosario 9/15/16; sjm icd, rc annual thresh/M see Josefa Isidroo $0CP rosario 9/15/16  b 7-5-17; pt r/s from 10/12 to 2/15/18  
 [address]  
[phone]  
  
   
 [address] [place]  
  
    
 3/2/2018  2:40 PM  
PROCEDURE with Kylie Taylor MD  
[facility] ([address]) Appt Note: EPS & SVT ablation/[facility]  
 [address] [facility] [address]  
[phone]  
  
   
 [address] [address]  
  
    
 3/15/2018  2:15 PM  
PACEMAKER with Schnecks[place]frederic Espino [facility] ([facility] SCHEDULING) Appt Note: 2 week f/u from SVT ablation/EKG needed [address] [place] [address] [address]  
[phone]  
  
    
 3/15/2018  2:20 PM  
ESTABLISHED PATIENT with Kylie Taylor MD  
[facility] ([address]) Appt Note: 2 week f/u from SVT ablation/EKG needed [address] [address]  
[phone]  
  
    
 4/10/2018  8:20 AM  
ESTABLISHED PATIENT with Marivel Gongora MD  
[facility] ([facility]) Appt Note: 3-4 mo f/u per Dr. Polly Park [address]  
[address] [facility]  
  
    
 3/12/2019  3:20 PM  
ESTABLISHED PATIENT with Akhil [place]ward MD  
[facility] ([facility]) Appt Note: sjm ICD/rc/Sandoval b   [facility] [address] [address]  
[phone]  
  
    
 3/12/2019  3:30 PM  
PACEMAKER with Marjorie Urbano [facility] ([facility]) Appt Note: sjm ICD/rc/Sandoval b   [facility] [address]  
[phone]  
  
    
  
 5/21/2018  2:00 PM  
REMOTE OFFICE VISIT with Gabe Carnes [facility] ([facility]) Appt Note: merlin ICD/rc b2-15-18  
 [address]  
[address] [place] [address]  
  
    
 8/22/2018 11:45 AM  
REMOTE OFFICE VISIT with Gabe Carnes [facility] ([facility]) Appt Note: merlin ICD/rc b  
 [address]  
[phone]  
  
    
 11/28/2018 10:45 AM  
REMOTE OFFICE VISIT with Gabe Carnes [facility] ([facility]) Appt Note: merlin ICD/rc b  
 [address]  
[phone] Upcoming Health Maintenance Date Due DTaP/Tdap/Td series (1 - Tdap) 8/24/2005 PAP AKA CERVICAL CYTOLOGY 8/24/2005 Influenza Age 5 to Adult 8/1/2017 Allergies as of 2/15/2018  Review Complete On: 2/15/2018 By: Akhil [place] MD  
  
 Severity Noted Reaction Type Reactions Bee Sting [Sting, Bee] High 11/01/2012    Anaphylaxis Lisinopril  01/25/2012   Side Effect Cough Current Immunizations  Reviewed on 2/2/2016 Name Date Influenza Vaccine Split 11/1/2011 ZZZ-RETIRED (DO NOT USE) Pneumococcal Vaccine (Unspecified Type) 11/1/2011 Not reviewed this visit You Were Diagnosed With   
  
 Codes Comments Single implantable cardioverter-defibrillator in situ    -  Primary ICD-10-CM: Z95.810 ICD-9-CM: V45.02 Dilated cardiomyopathy ([address]     ICD-10-CM: I42.0 ICD-9-CM: 425.4 SVT (supraventricular tachycardia) (HCC)     ICD-10-CM: I47.1 ICD-9-CM: 427.89 Essential hypertension     ICD-10-CM: I10 
ICD-9-CM: 401.9 Mitral valve insufficiency, unspecified etiology     ICD-10-CM: I34.0 ICD-9-CM: 424.0 Pre-procedure lab exam     ICD-10-CM: H24.344 ICD-9-CM: V72.63 Vitals BP Pulse Resp Height(growth percentile) Weight(growth percentile) SpO2  
 116/70 (BP 1 Location: Left arm) 64 18 5' 1\" (1.549 m) 224 lb 9.6 oz (101.9 kg) 98% BMI OB Status Smoking Status 42.44 kg/m2 IUD Never Smoker Vitals History BMI and BSA Data Body Mass Index Body Surface Area  
 42.44 kg/m 2 2.09 m 2 Preferred Pharmacy Pharmacy Name Phone [facility] [address], [address] [place] [address] [phone] Your Updated Medication List  
  
   
This list is accurate as of: 2/15/18  1:53 PM.  Always use your most recent med list.  
  
  
  
  
 b complex vitamins Liqd Take  by mouth daily. bumetanide 2 mg tablet Commonly known as:  Bonnie Dumont TAKE 1 TABLET BY MOUTH TWICE DAILY CALCIUM 600 WITH VITAMIN D3 600 mg(1,500mg) -400 unit Chew Generic drug:  Calcium-Cholecalciferol (D3) Take  by mouth two (2) times a day. * carvedilol 6.25 mg tablet Commonly known as:  Cony Gupta Take 1 Tab by mouth two (2) times daily (with meals). * carvedilol 25 mg tablet Commonly known as:  Cony Gupta  
 Take 1 Tab by mouth two (2) times daily (with meals). Iron 325 mg (65 mg iron) tablet Generic drug:  ferrous sulfate Take  by mouth Daily (before breakfast). losartan 100 mg tablet Commonly known as:  COZAAR  
TAKE 1 TABLET BY MOUTH DAILY  
  
 multivitamin tablet Commonly known as:  ONE A DAY Take 1 Tab by mouth daily. * Notice: This list has 2 medication(s) that are the same as other medications prescribed for you. Read the directions carefully, and ask your doctor or other care provider to review them with you. We Performed the Following CBC WITH AUTOMATED DIFF [91477 CPT(R)] METABOLIC PANEL, BASIC [83587 CPT(R)] Follow-up Instructions Return in about 1 year (around 2/15/2019). Patient Instructions Your EP study and SVT ablation procedure has been scheduled for 3/2/18 at 2:30pm, at [facility]. 
 
Please report to Admitting Department by 12:20pm, or 2 hours prior to your scheduled procedure. Please bring a list of your current medications and medication bottles, if able, to the hospital on this day. You will be unable to drive after your procedure so please make sure to bring someone with you to your procedure. You will need to have nothing to eat or drink after midnight, the night prior to your procedure. You may have small sips of water, if needed, to take with your medication. You will need labs drawn prior to your procedure. Please go to [facility] to have this done as soon as your able to. You should stop your medication, coreg, 2 days prior to your scheduled procedure. After your procedure, you will need to follow up with Dr. Ede Iglesias. Your follow-up appointment has been scheduled for 3/15/18 at 2:15pm.  
 
 
 
 
 
 
  
Introducing [employer]! Dear Miguelangel Paula: 
Thank you for requesting a Nativis account. Our records indicate that you already have an active Nativis account.   You can access your account anytime at [email or web address] Did you know that you can access your hospital and ER discharge instructions at any time in MyTime? You can also review all of your test results from your hospital stay or ER visit. Additional Information If you have questions, please visit the Frequently Asked Questions section of the MyTime website at [email or web address] Remember, MyTime is NOT to be used for urgent needs. For medical emergencies, dial 911. Now available from your iPhone and Android! Please provide this summary of care documentation to your next provider. Your primary care clinician is listed as Sonali Pascual [facility] [place]. If you have any questions after today's visit, please call [phone].

## 2018-02-15 NOTE — PROGRESS NOTES
Subjective:      Lucy Arroyo is a 34 y.o. female who is seen today to discuss frequent episodes of SVT noted on her ICD. She says she sometimes feels her heart racing when it is going fast. She is already on the max dose of Coreg. She denies recently hospitalizations. She recently had labs done with her new PCP. Most recent device check shows (Single lead ICD) 60 episodes of SVT duration of the SVTs are in minutes however patient takes additional dose of coreg to control and she is getting easily tired  Her echocardiogram January 2018 showed left ventricular ejection fraction 30%. She has moderate right and left atrial enlargement and moderate mitral valve regurgitation. In the past:    She had ICD done because of syncope with cardiomyopathy  The patient was diagnosed with postpartum cardiomyopathy in 2008. She has been seen by Dr. Nani Mahajan but wanted to change cardiology practice and has seen Dr. Lilla Nageotte. NYHA class II.    Echo 11/2014 EF 15%, moderate MR, VA.moderate to severe TR  She had ICD lead fractured (she was not compliant and followed up regularly at the time) and lead was extracted and reimplanted by me    Problem List  Date Reviewed: 2/15/2018          Codes Class Noted    ICD (implantable cardioverter-defibrillator) lead failure ICD-10-CM: T82.110A  ICD-9-CM: 996.04  2/2/2016    Overview Signed 2/3/2016  6:21 AM by Kylie Taylor MD     ICD lead extraction 2/2/2016  Reimplant new ICD lead  General anesthesia             Tricuspid inspiratory pansystolic murmur [id number]: I07.9  ICD-9-CM: 785.2  10/17/2013        Pulmonary hypertension ICD-10-CM: I27.20  ICD-9-CM: 416.8  10/17/2013        Headache(784.0) ICD-10-CM: R51  ICD-9-CM: 784.0  4/17/2013        Abnormal EKG ICD-10-CM: R94.31  ICD-9-CM: 794.31  11/19/2012        Mitral regurgitation ICD-10-CM: I34.0  ICD-9-CM: 424.0  11/19/2012        BP (high blood pressure) ICD-10-CM: I10  ICD-9-CM: 401.9  7/30/2012        Single implantable cardioverter-defibrillator in situ ICD-10-CM: Z95.810  ICD-9-CM: V45.02  2/1/2012    Overview Signed 2/1/2012  7:00 AM by MD Leonard Ewing single lead single coil AICD implant 1/30/2012             Cardiomyopathy, dilated, nonischemic (HCC) ICD-10-CM: I42.9  ICD-9-CM: 425.4  Unknown        Morbid obesity ([address] ICD-10-CM: E66.01  ICD-9-CM: 278.01  1/17/2012        Observed sleep apnea ICD-10-CM: G47.30  ICD-9-CM: 780.57  1/17/2012        Mitral valve disorder ICD-10-CM: I05.9  ICD-9-CM: 394.9  1/6/2012        Palpitations ICD-10-CM: R00.2  ICD-9-CM: 785.1  12/22/2011        Syncope and collapse ICD-10-CM: R55  ICD-9-CM: 780.2  12/22/2011        Cardiomyopathy, peripartum, postpartum ICD-10-CM: O90.3  ICD-9-CM: 674.54  12/22/2011        Heart murmur ICD-10-CM: R01.1  ICD-9-CM: 785.2  12/22/2011                Current Outpatient Prescriptions   Medication Sig Dispense Refill    carvedilol (COREG) 25 mg tablet Take 1 Tab by mouth two (2) times daily (with meals). 180 Tab 2    losartan (COZAAR) 100 mg tablet TAKE 1 TABLET BY MOUTH DAILY 90 Tab 2    bumetanide (BUMEX) 2 mg tablet TAKE 1 TABLET BY MOUTH TWICE DAILY 180 Tab 2    carvedilol (COREG) 6.25 mg tablet Take 1 Tab by mouth two (2) times daily (with meals). 90 Tab 2    ferrous sulfate (IRON) 325 mg (65 mg iron) tablet Take  by mouth Daily (before breakfast).  b complex vitamins Liqd Take  by mouth daily.  multivitamin (ONE A DAY) tablet Take 1 Tab by mouth daily.  Calcium-Cholecalciferol, D3, (CALCIUM 600 WITH VITAMIN D3) 600 mg(1,500mg) -400 unit Chew Take  by mouth two (2) times a day.        Allergies   Allergen Reactions    Bee Sting [Sting, Bee] Anaphylaxis    Lisinopril Cough     Past Medical History:   Diagnosis Date    Arrhythmia     BRADYCARDIA    Cardiomyopathy, dilated, nonischemic (HCC)     Congestive heart failure, unspecified     Essential hypertension     Heart failure ([facility]     Hypertension     ICD (implantable cardiac defibrillator) in place     [facility] dual coil, single lead    Murmur     Syncope     Syncope and collapse 2011    TIA (transient ischemic attack)     Valvular heart disease      Past Surgical History:   Procedure Laterality Date    HX  SECTION      HX CHOLECYSTECTOMY      HX GASTRIC BYPASS      HX PACEMAKER      DEFIBRILLATOR    INS PPM/ICD LED SING ONLY  2012         BELLO DOPPLER  2013          No family history of sudden death. Social History   Substance Use Topics    Smoking status: Never Smoker    Smokeless tobacco: Never Used    Alcohol use No      Comment: RARE OCC      Review of Systems  Constitutional: Negative for fever, chills   HEENT: Negative for nosebleeds, acute vision changes. Respiratory: Negative for cough, hemoptysis, sputum production, and wheezing. Cardiovascular: Negative for orthopnea, claudication, PND. +  palpitations. Gastrointestinal: Negative for nausea, vomiting, blood in stool and melena. Genitourinary: Negative for dysuria, hematuria. Musculoskeletal: Negative for myalgias. Skin: Negative for rash and itching. Heme: Does not bleed or bruise easily. Neurological: Negative for speech change and focal weakness      Objective:     Visit Vitals    /70 (BP 1 Location: Left arm)    Pulse 64    Resp 18    Ht 5' 1\" (1.549 m)    Wt 224 lb 9.6 oz (101.9 kg)    SpO2 98%    BMI 42.44 kg/m2      Physical Exam:   General:  alert, cooperative, no distress, appears stated age    Neck: carotids upstroke normal bilaterally, no bruits, no JVD   Chest Wall: respiratory effort normal   Lung: clear to auscultation bilaterally   Heart:  normal rate and regular rhythm, hard to hear murmur    Abdomen: Soft, moderate obesity   Extremities: No leg edema             Skin: ICD site on the left shows keloid scar, no redness,          Assessment/Plan:       ICD-10-CM ICD-9-CM    1.  Single implantable cardioverter-defibrillator in situ Z95.810 V45.02    2. Dilated cardiomyopathy (HCC) I42.0 425.4    3. SVT (supraventricular tachycardia) (HCC) I47.1 427.89    4. Essential hypertension I10 401.9    5. Mitral valve insufficiency, unspecified etiology I34.0 424.0       Reviewed device check findings with her. Discussed EP study ablation and SVT ablation She is on GDMT, BP is tolerating the increased dose of Coreg but she is tired easily  She wants to have EP study and ablation. reviewed diet, exercise and weight control  reviewed medications and side effects in detail  ICD check shows proper function  She has single lead  Risks include but are not limited to bleeding in the groin, infection in the groin and/or heart valves, fistula between the groin arteries and veins, valvular damage, diaphragmatic paralysis, aortic dissection, heart attack, stroke, blood clot in the leg, pulmonary embolism, lung collapse (pneumothorax or hemothorax), heart collapse (pericardial tamponade), av block death. The added risks for left atrial ablation may be atrial-esophageal fistula, pulmonary vein stenoses, kidney failure (from contrast injection), higher risk of bleeding, stroke and heart attack. Elective or emergency surgery may be required to repair some of these complications. Prolonged hospitalization would be required. General anesthesia and transeptal catheterization may be required for the procedure  Follow-up Disposition:  Return in about 1 year (around 2/15/2019). Jojo Avitia M.D.  Formerly [facility]  Electrophysiology/Cardiology  [facility] [place]  [address] [place] [address]  [address] [facility], [address] [place] [address]  [phone]

## 2018-02-15 NOTE — LETTER
NOTIFICATION RETURN TO WORK  
 
2/15/2018 1:57 PM 
 
Ms. Leandra Garcia [address] [facility] [id number] To Whom It May Concern: 
 
Leandra Garcia is currently under the care of [address] [place], [place] [address]chau [place]. She was at an appointment on: 2/15/18 If there are questions or concerns please have the patient contact our office.  
 
 
 
Sincerely, 
 
 
Radha Gillespie MD

## 2018-02-15 NOTE — PATIENT INSTRUCTIONS
Your EP study and SVT ablation procedure has been scheduled for 3/2/18 at 2:30pm, at [facility].    Please report to Admitting Department by 12:20pm, or 2 hours prior to your scheduled procedure. Please bring a list of your current medications and medication bottles, if able, to the hospital on this day. You will be unable to drive after your procedure so please make sure to bring someone with you to your procedure. You will need to have nothing to eat or drink after midnight, the night prior to your procedure. You may have small sips of water, if needed, to take with your medication. You will need labs drawn prior to your procedure. Please go to [facility] to have this done as soon as your able to. You should stop your medication, coreg, 2 days prior to your scheduled procedure. After your procedure, you will need to follow up with Dr. Jamar Martinez.  Your follow-up appointment has been scheduled for 3/15/18 at 2:15pm.

## 2018-02-16 LAB
BASOPHILS # BLD AUTO: 0.1 X10E3/UL (ref 0–0.2)
BASOPHILS NFR BLD AUTO: 1 %
BUN SERPL-MCNC: 9 MG/DL (ref 6–20)
BUN/CREAT SERPL: 11 (ref 9–23)
CALCIUM SERPL-MCNC: 9 MG/DL (ref 8.7–10.2)
CHLORIDE SERPL-SCNC: 99 MMOL/L (ref 96–106)
CO2 SERPL-SCNC: 26 MMOL/L (ref 18–29)
CREAT SERPL-MCNC: 0.82 MG/DL (ref 0.57–1)
EOSINOPHIL # BLD AUTO: 0.3 X10E3/UL (ref 0–0.4)
EOSINOPHIL NFR BLD AUTO: 6 %
ERYTHROCYTE [DISTWIDTH] IN BLOOD BY AUTOMATED COUNT: 14.3 % (ref 12.3–15.4)
GFR SERPLBLD CREATININE-BSD FMLA CKD-EPI: 109 ML/MIN/1.73
GFR SERPLBLD CREATININE-BSD FMLA CKD-EPI: 94 ML/MIN/1.73
GLUCOSE SERPL-MCNC: 89 MG/DL (ref 65–99)
HCT VFR BLD AUTO: 39.7 % (ref 34–46.6)
HGB BLD-MCNC: 13.1 G/DL (ref 11.1–15.9)
IMM GRANULOCYTES # BLD: 0 X10E3/UL (ref 0–0.1)
IMM GRANULOCYTES NFR BLD: 0 %
LYMPHOCYTES # BLD AUTO: 2.4 X10E3/UL (ref 0.7–3.1)
LYMPHOCYTES NFR BLD AUTO: 51 %
MCH RBC QN AUTO: 29.1 PG (ref 26.6–33)
MCHC RBC AUTO-ENTMCNC: 33 G/DL (ref 31.5–35.7)
MCV RBC AUTO: 88 FL (ref 79–97)
MONOCYTES # BLD AUTO: 0.5 X10E3/UL (ref 0.1–0.9)
MONOCYTES NFR BLD AUTO: 10 %
NEUTROPHILS # BLD AUTO: 1.5 X10E3/UL (ref 1.4–7)
NEUTROPHILS NFR BLD AUTO: 32 %
PLATELET # BLD AUTO: 178 X10E3/UL (ref 150–379)
POTASSIUM SERPL-SCNC: 4.6 MMOL/L (ref 3.5–5.2)
RBC # BLD AUTO: 4.5 X10E6/UL (ref 3.77–5.28)
SODIUM SERPL-SCNC: 138 MMOL/L (ref 134–144)
WBC # BLD AUTO: 4.7 X10E3/UL (ref 3.4–10.8)

## 2018-02-20 RX ORDER — SODIUM CHLORIDE 0.9 % (FLUSH) 0.9 %
5-10 SYRINGE (ML) INJECTION EVERY 8 HOURS
Status: CANCELLED | OUTPATIENT
Start: 2018-02-20

## 2018-02-20 RX ORDER — SODIUM CHLORIDE 0.9 % (FLUSH) 0.9 %
5-10 SYRINGE (ML) INJECTION AS NEEDED
Status: CANCELLED | OUTPATIENT
Start: 2018-02-20

## 2018-03-02 ENCOUNTER — HOSPITAL ENCOUNTER (OUTPATIENT)
Dept: CARDIAC CATH/INVASIVE PROCEDURES | Age: 33
Setting detail: OBSERVATION
Discharge: HOME | End: 2018-03-03
Attending: INTERNAL MEDICINE | Admitting: INTERNAL MEDICINE
Payer: MEDICAID

## 2018-03-02 PROBLEM — Z98.890 S/P ABLATION OF VENTRICULAR ARRHYTHMIA: Status: ACTIVE | Noted: 2018-03-02

## 2018-03-02 PROBLEM — I47.1 PSVT (PAROXYSMAL SUPRAVENTRICULAR TACHYCARDIA) (HCC): Status: ACTIVE | Noted: 2018-03-02

## 2018-03-02 PROBLEM — Z86.79 S/P ABLATION OF VENTRICULAR ARRHYTHMIA: Status: ACTIVE | Noted: 2018-03-02

## 2018-03-02 PROCEDURE — 77030010869 HC CBL EP ABL STJU -B

## 2018-03-02 PROCEDURE — 99153 MOD SED SAME PHYS/QHP EA: CPT

## 2018-03-02 PROCEDURE — 74011250637 HC RX REV CODE- 250/637: Performed by: INTERNAL MEDICINE

## 2018-03-02 PROCEDURE — C1730 CATH, EP, 19 OR FEW ELECT: HCPCS

## 2018-03-02 PROCEDURE — 77030016894 HC CBL EP DX CATH3 STJU -B

## 2018-03-02 PROCEDURE — C1894 INTRO/SHEATH, NON-LASER: HCPCS

## 2018-03-02 PROCEDURE — 77030018733 HC ELECTRD KT ENSITE STJU -G

## 2018-03-02 PROCEDURE — 77030016899 HC CBL EP EXT4 BSC -B

## 2018-03-02 PROCEDURE — 74011000250 HC RX REV CODE- 250: Performed by: INTERNAL MEDICINE

## 2018-03-02 PROCEDURE — 99218 HC RM OBSERVATION: CPT

## 2018-03-02 PROCEDURE — 74011250636 HC RX REV CODE- 250/636: Performed by: INTERNAL MEDICINE

## 2018-03-02 RX ORDER — HYDROCODONE BITARTRATE AND ACETAMINOPHEN 5; 325 MG/1; MG/1
1 TABLET ORAL
Status: DISCONTINUED | OUTPATIENT
Start: 2018-03-02 | End: 2018-03-03 | Stop reason: HOSPADM

## 2018-03-02 RX ORDER — ATROPINE SULFATE 0.1 MG/ML
1 INJECTION INTRAVENOUS AS NEEDED
Status: DISCONTINUED | OUTPATIENT
Start: 2018-03-02 | End: 2018-03-02

## 2018-03-02 RX ORDER — CARVEDILOL 12.5 MG/1
25 TABLET ORAL 2 TIMES DAILY WITH MEALS
Status: DISCONTINUED | OUTPATIENT
Start: 2018-03-02 | End: 2018-03-03 | Stop reason: HOSPADM

## 2018-03-02 RX ORDER — CARVEDILOL 6.25 MG/1
6.25 TABLET ORAL 2 TIMES DAILY WITH MEALS
Status: DISCONTINUED | OUTPATIENT
Start: 2018-03-02 | End: 2018-03-03 | Stop reason: HOSPADM

## 2018-03-02 RX ORDER — MIDAZOLAM HYDROCHLORIDE 1 MG/ML
.5-1 INJECTION, SOLUTION INTRAMUSCULAR; INTRAVENOUS
Status: DISCONTINUED | OUTPATIENT
Start: 2018-03-02 | End: 2018-03-02

## 2018-03-02 RX ORDER — SODIUM CHLORIDE 0.9 % (FLUSH) 0.9 %
5-10 SYRINGE (ML) INJECTION EVERY 8 HOURS
Status: DISCONTINUED | OUTPATIENT
Start: 2018-03-02 | End: 2018-03-03 | Stop reason: HOSPADM

## 2018-03-02 RX ORDER — ADENOSINE 3 MG/ML
6-24 INJECTION, SOLUTION INTRAVENOUS
Status: DISCONTINUED | OUTPATIENT
Start: 2018-03-02 | End: 2018-03-02

## 2018-03-02 RX ORDER — ACETAMINOPHEN 325 MG/1
650 TABLET ORAL
Status: DISCONTINUED | OUTPATIENT
Start: 2018-03-02 | End: 2018-03-03 | Stop reason: HOSPADM

## 2018-03-02 RX ORDER — SODIUM CHLORIDE 0.9 % (FLUSH) 0.9 %
5-10 SYRINGE (ML) INJECTION AS NEEDED
Status: DISCONTINUED | OUTPATIENT
Start: 2018-03-02 | End: 2018-03-02

## 2018-03-02 RX ORDER — LIDOCAINE HYDROCHLORIDE 10 MG/ML
10-40 INJECTION INFILTRATION; PERINEURAL AS NEEDED
Status: DISCONTINUED | OUTPATIENT
Start: 2018-03-02 | End: 2018-03-02

## 2018-03-02 RX ORDER — LOSARTAN POTASSIUM 50 MG/1
100 TABLET ORAL DAILY
Status: DISCONTINUED | OUTPATIENT
Start: 2018-03-03 | End: 2018-03-03 | Stop reason: HOSPADM

## 2018-03-02 RX ORDER — SODIUM CHLORIDE 9 MG/ML
25 INJECTION, SOLUTION INTRAVENOUS CONTINUOUS
Status: DISCONTINUED | OUTPATIENT
Start: 2018-03-02 | End: 2018-03-03 | Stop reason: HOSPADM

## 2018-03-02 RX ORDER — FENTANYL CITRATE 50 UG/ML
25-200 INJECTION, SOLUTION INTRAMUSCULAR; INTRAVENOUS
Status: DISCONTINUED | OUTPATIENT
Start: 2018-03-02 | End: 2018-03-02

## 2018-03-02 RX ORDER — SODIUM CHLORIDE 0.9 % (FLUSH) 0.9 %
5-10 SYRINGE (ML) INJECTION AS NEEDED
Status: DISCONTINUED | OUTPATIENT
Start: 2018-03-02 | End: 2018-03-03 | Stop reason: HOSPADM

## 2018-03-02 RX ORDER — BUMETANIDE 1 MG/1
2 TABLET ORAL 2 TIMES DAILY
Status: DISCONTINUED | OUTPATIENT
Start: 2018-03-02 | End: 2018-03-03 | Stop reason: HOSPADM

## 2018-03-02 RX ORDER — NALOXONE HYDROCHLORIDE 0.4 MG/ML
0.4 INJECTION, SOLUTION INTRAMUSCULAR; INTRAVENOUS; SUBCUTANEOUS AS NEEDED
Status: DISCONTINUED | OUTPATIENT
Start: 2018-03-02 | End: 2018-03-03 | Stop reason: HOSPADM

## 2018-03-02 RX ADMIN — Medication 10 ML: at 22:00

## 2018-03-02 RX ADMIN — MIDAZOLAM HYDROCHLORIDE 2 MG: 1 INJECTION, SOLUTION INTRAMUSCULAR; INTRAVENOUS at 16:20

## 2018-03-02 RX ADMIN — HYDROCODONE BITARTRATE AND ACETAMINOPHEN 1 TABLET: 5; 325 TABLET ORAL at 23:50

## 2018-03-02 RX ADMIN — MIDAZOLAM HYDROCHLORIDE 2 MG: 1 INJECTION, SOLUTION INTRAMUSCULAR; INTRAVENOUS at 16:10

## 2018-03-02 RX ADMIN — FENTANYL CITRATE 25 MCG: 50 INJECTION, SOLUTION INTRAMUSCULAR; INTRAVENOUS at 16:20

## 2018-03-02 RX ADMIN — FENTANYL CITRATE 50 MCG: 50 INJECTION, SOLUTION INTRAMUSCULAR; INTRAVENOUS at 16:00

## 2018-03-02 RX ADMIN — MIDAZOLAM HYDROCHLORIDE 2 MG: 1 INJECTION, SOLUTION INTRAMUSCULAR; INTRAVENOUS at 16:08

## 2018-03-02 RX ADMIN — LIDOCAINE HYDROCHLORIDE 30 ML: 10 INJECTION, SOLUTION INFILTRATION; PERINEURAL at 16:13

## 2018-03-02 RX ADMIN — SODIUM CHLORIDE 25 ML/HR: 9 INJECTION, SOLUTION INTRAVENOUS at 15:00

## 2018-03-02 RX ADMIN — FENTANYL CITRATE 50 MCG: 50 INJECTION, SOLUTION INTRAMUSCULAR; INTRAVENOUS at 16:08

## 2018-03-02 RX ADMIN — Medication 10 ML: at 16:22

## 2018-03-02 RX ADMIN — HYDROCODONE BITARTRATE AND ACETAMINOPHEN 1 TABLET: 5; 325 TABLET ORAL at 18:18

## 2018-03-02 RX ADMIN — MIDAZOLAM HYDROCHLORIDE 2 MG: 1 INJECTION, SOLUTION INTRAMUSCULAR; INTRAVENOUS at 16:00

## 2018-03-02 RX ADMIN — BUMETANIDE 2 MG: 1 TABLET ORAL at 20:20

## 2018-03-02 RX ADMIN — CARVEDILOL 25 MG: 12.5 TABLET, FILM COATED ORAL at 18:14

## 2018-03-02 RX ADMIN — CARVEDILOL 6.25 MG: 6.25 TABLET, FILM COATED ORAL at 18:14

## 2018-03-02 RX ADMIN — ISOPROTERENOL HYDROCHLORIDE 2 MCG/MIN: 0.2 INJECTION, SOLUTION INTRAMUSCULAR; INTRAVENOUS at 16:30

## 2018-03-02 NOTE — IP AVS SNAPSHOT
[address] [facility] [place] [address] [place] [address] 
[phone] Patient: Garret Mckeon MRN: [id number] GZD:[birth year] A check liz indicates which time of day the medication should be taken. My Medications CONTINUE taking these medications Instructions Each Dose to Equal  
 Morning Noon Evening Bedtime  
 b complex vitamins Liqd Your last dose was: Your next dose is: Take  by mouth daily. bumetanide 2 mg tablet Commonly known as:  Anne-Marie Mayo Your last dose was: Your next dose is: TAKE 1 TABLET BY MOUTH TWICE DAILY CALCIUM 600 WITH VITAMIN D3 600 mg(1,500mg) -400 unit Chew Generic drug:  Calcium-Cholecalciferol (D3) Your last dose was: Your next dose is: Take  by mouth two (2) times a day. * carvedilol 6.25 mg tablet Commonly known as:  Yusra Perry Your last dose was: Your next dose is: Take 1 Tab by mouth two (2) times daily (with meals). 6.25 mg  
    
   
   
   
  
 * carvedilol 25 mg tablet Commonly known as:  Yusra Perry Your last dose was: Your next dose is: Take 1 Tab by mouth two (2) times daily (with meals). 25 mg Iron 325 mg (65 mg iron) tablet Generic drug:  ferrous sulfate Your last dose was: Your next dose is: Take  by mouth Daily (before breakfast). losartan 100 mg tablet Commonly known as:  COZAAR Your last dose was: Your next dose is: TAKE 1 TABLET BY MOUTH DAILY  
     
   
   
   
  
 multivitamin tablet Commonly known as:  ONE A DAY Your last dose was: Your next dose is: Take 1 Tab by mouth daily. 1 Tab * Notice:   This list has 2 medication(s) that are the same as other medications prescribed for you. Read the directions carefully, and ask your doctor or other care provider to review them with you.

## 2018-03-02 NOTE — PROGRESS NOTES
Cardiac Cath Lab Recovery Arrival Note:      Sharyle Nap arrived to Cardiac Cath Lab, Recovery Area. Staff introduced to patient. Patient identifiers verified with NAME and DATE OF BIRTH. Procedure verified with patient. Consent forms reviewed and signed by patient or authorized representative and verified. Allergies verified. Patient and family oriented to department. Patient and family informed of procedure and plan of care. Questions answered with review. Patient prepped for procedure, per orders from physician, prior to arrival.    Patient on cardiac monitor, non-invasive blood pressure, SPO2 monitor. On room air. Patient is A&Ox 4. Patient reports no complaints. Patient in stretcher, in low position, with side rails up, call bell within reach, patient instructed to call if assistance as needed. Patient prep in: [address], [place].    Patient family has pager # [phone]  Family in: parents in hospital.   Prep by: Dom Merino RN    Pre EPS/ablation teaching completed

## 2018-03-02 NOTE — PROGRESS NOTES
Cardiac Cath Lab Procedure Area Arrival Note:    Cale Carvalho arrived to Cardiac Cath Lab, Procedure Area. Patient identifiers verified with NAME and DATE OF BIRTH. Procedure verified with patient. Consent forms verified. Allergies verified. Patient informed of procedure and plan of care. Questions answered with review. Patient voiced understanding of procedure and plan of care. Patient on cardiac monitor, non-invasive blood pressure, SPO2 monitor. On room air. Placed on   O2 @ 2 lpm via nasal cannula. IV of NS on pump at 25 ml/hr. Patient status doing well without problems. Patient is A&Ox 4. Patient reports no pain. Patient medicated during procedure with orders obtained and verified by Dr. Len Nance. Refer to patients Cardiac Cath Lab PROCEDURE REPORT for vital signs, assessment, status, and response during procedure, printed at end of case. Printed report on chart or scanned into chart.

## 2018-03-02 NOTE — PROGRESS NOTES
Transfer to [address] [place] [address] from Procedure Area    Verbal report given to Aaron Smith RN on [employer] being transferred to Cardiac Cath Lab  for routine post - op   Patient is post EP study procedure. Patient stable upon transfer to . Report consisted of patients Situation, Background, Assessment and   Recommendations(SBAR). Information from the following report(s) Procedure Summary, Intake/Output, MAR and Cardiac Rhythm NSR was reviewed with the receiving nurse. Opportunity for questions and clarification was provided. Patient medicated during procedure with orders obtained and verified by Dr. Denys Linn. Refer to patient PROCEDURE REPORT for vital signs, assessment, status, and response during procedure.

## 2018-03-02 NOTE — IP AVS SNAPSHOT
[address] [facility] [address] [place] [address] 
[phone] Patient: Staci Calix MRN: [id number] WPS:2/53/9784 About your hospitalization You were admitted on:  March 2, 2018 You last received care in the:  [facility] You were discharged on:  March 3, 2018 Why you were hospitalized Your primary diagnosis was:  Psvt (Paroxysmal Supraventricular Tachycardia) (Formerly [facility]) Your diagnoses also included:  Palpitations, Morbid Obesity (Hcc), Cardiomyopathy, Dilated, Nonischemic (Hcc), Single Implantable Cardioverter-Defibrillator In Situ, S/P Ablation Of Ventricular Arrhythmia Follow-up Information Follow up With Details Comments Contact Info Antonietta Gonzalez MD   [facility] [address] [place] 
[phone] Your Scheduled Appointments Thursday March 15, 2018  3:30 PM EDT  
PACEMAKER with Stephen Kebede [facility] ([facility] SCHEDULING) [address] [place]  
[phone] Thursday March 15, 2018  3:40 PM EDT  
ESTABLISHED PATIENT with Antonietta Gonzalez MD  
[facility] ([facility]) [address] [place]  
[phone] Tuesday April 10, 2018  8:20 AM EDT  
ESTABLISHED PATIENT with Gini Roca MD  
[facility] ([facility]) [address] [facility]  
[phone] Discharge Orders None A check liz indicates which time of day the medication should be taken. My Medications CONTINUE taking these medications Instructions Each Dose to Equal  
 Morning Noon Evening Bedtime  
 b complex vitamins Liqd Your last dose was: Your next dose is: Take  by mouth daily. bumetanide 2 mg tablet Commonly known as:  Jacobo Jaramillo Your last dose was: Your next dose is: TAKE 1 TABLET BY MOUTH TWICE DAILY CALCIUM 600 WITH VITAMIN D3 600 mg(1,500mg) -400 unit Chew Generic drug:  Calcium-Cholecalciferol (D3) Your last dose was: Your next dose is: Take  by mouth two (2) times a day. * carvedilol 6.25 mg tablet Commonly known as:  Stuart Nikole Your last dose was: Your next dose is: Take 1 Tab by mouth two (2) times daily (with meals). 6.25 mg  
    
   
   
   
  
 * carvedilol 25 mg tablet Commonly known as:  Chino Nikole Your last dose was: Your next dose is: Take 1 Tab by mouth two (2) times daily (with meals). 25 mg Iron 325 mg (65 mg iron) tablet Generic drug:  ferrous sulfate Your last dose was: Your next dose is: Take  by mouth Daily (before breakfast). losartan 100 mg tablet Commonly known as:  COZAAR Your last dose was: Your next dose is: TAKE 1 TABLET BY MOUTH DAILY  
     
   
   
   
  
 multivitamin tablet Commonly known as:  ONE A DAY Your last dose was: Your next dose is: Take 1 Tab by mouth daily. 1 Tab * Notice: This list has 2 medication(s) that are the same as other medications prescribed for you. Read the directions carefully, and ask your doctor or other care provider to review them with you. Discharge Instructions Patient Instructions Post-EP study 1. No heavy lifting or exercises for 1 week. This includes the following: Do not push or move furniture, jog or run 2. Do not drive for 1 day 3. Call Dr. Lawrence Russell at [phone] if you experience any of the following symptoms: 
Dizziness, lightheadedness, fainting spells Lack of energy Shortness of breath Rapid heart rate Chest or muscle twitches Blurry vision, double vision, weakness, numbness Nausea, vomiting Fever Bleeding in the stool, black stool Leg swelling, pain 4. Follow-up with Dr. Damien Gonzalez Future Appointments Date Time Provider Shanae Vargas 3/15/2018 3:30 PM PACEMAKER3, [address]  
3/15/2018 3:40 PM Jania Kapadia  [address]  
4/10/2018 8:20 AM Ashtyn Velarde  [address]  
5/21/2018 2:00 PM Satish Zamorano [facility] SCHED  
8/22/2018 11:45 AM REMOTE1, CARLIN HICKS [facility] SCHED  
11/28/2018 10:45 AM REMOTE1, [address]  
3/12/2019 3:20 PM Jania Kapadia  [address]  
3/12/2019 3:30 PM PACEMAKER3, [address] [email or web address] Announcement We are excited to announce that we are making your provider's discharge notes available to you in [email or web address]. You will see these notes when they are completed and signed by the physician that discharged you from your recent hospital stay. If you have any questions or concerns about any information you see in [email or web address], please call the Health Information Department where you were seen or reach out to your Primary Care Provider for more information about your plan of care. Introducing [facility]! Dear Sujatha Malave: 
Thank you for requesting a [email or web address] account. Our records indicate that you already have an active [email or web address] account. You can access your account anytime at [email or web address] Did you know that you can access your hospital and ER discharge instructions at any time in [email or web address]? You can also review all of your test results from your hospital stay or ER visit. Additional Information If you have questions, please visit the Frequently Asked Questions section of the [email or web address] website at [email or web address] Remember, [email or web address] is NOT to be used for urgent needs. For medical emergencies, dial 911. Now available from your iPhone and Android! Providers Seen During Your Hospitalization Provider Specialty Primary office phone Izell Cowden, MD Cardiology [phone] Your Primary Care Physician (PCP) Primary Care Physician Office Phone Office Fax [address] [facility] [phone] You are allergic to the following Allergen Reactions Bee Sting (Sting, Bee) Anaphylaxis Lisinopril Cough Recent Documentation Height Weight Breastfeeding? BMI OB Status Smoking Status 1.575 m 101.1 kg No 40.77 kg/m2 IUD Never Smoker Emergency Contacts Name Discharge Info Relation Home Work Mobile [address] CAREGIVER [3] Mother [14] [phone] Patient Belongings The following personal items are in your possession at time of discharge: 
  Dental Appliances: None  Visual Aid: None      Home Medications: None   Jewelry: None  Clothing: At bedside    Other Valuables: None Please provide this summary of care documentation to your next provider. Signatures-by signing, you are acknowledging that this After Visit Summary has been reviewed with you and you have received a copy. Patient Signature:  ____________________________________________________________ Date:  ____________________________________________________________  
  
Delfino Reyes Provider Signature:  ____________________________________________________________ Date:  ____________________________________________________________

## 2018-03-02 NOTE — H&P (VIEW-ONLY)
Subjective:      Alireza Rodriguez is a 34 y.o. female who is seen today to discuss frequent episodes of SVT noted on her ICD. She says she sometimes feels her heart racing when it is going fast. She is already on the max dose of Coreg. She denies recently hospitalizations. She recently had labs done with her new PCP. Most recent device check shows (Single lead ICD) 60 episodes of SVT duration of the SVTs are in minutes however patient takes additional dose of coreg to control and she is getting easily tired  Her echocardiogram January 2018 showed left ventricular ejection fraction 30%. She has moderate right and left atrial enlargement and moderate mitral valve regurgitation. In the past:    She had ICD done because of syncope with cardiomyopathy  The patient was diagnosed with postpartum cardiomyopathy in 2008. She has been seen by Dr. Angelia Mendoza but wanted to change cardiology practice and has seen Dr. Viktor Contreras. NYHA class II.    Echo 11/2014 EF 15%, moderate MR, NV.moderate to severe TR  She had ICD lead fractured (she was not compliant and followed up regularly at the time) and lead was extracted and reimplanted by me    Problem List  Date Reviewed: 2/15/2018          Codes Class Noted    ICD (implantable cardioverter-defibrillator) lead failure ICD-10-CM: T82.110A  ICD-9-CM: 996.04  2/2/2016    Overview Signed 2/3/2016  6:21 AM by Jesus Alexander MD     ICD lead extraction 2/2/2016  Reimplant new ICD lead  General anesthesia             Tricuspid inspiratory pansystolic murmur EFT-95-IP: I07.9  ICD-9-CM: 785.2  10/17/2013        Pulmonary hypertension ICD-10-CM: I27.20  ICD-9-CM: 416.8  10/17/2013        Headache(784.0) ICD-10-CM: R51  ICD-9-CM: 784.0  4/17/2013        Abnormal EKG ICD-10-CM: R94.31  ICD-9-CM: 794.31  11/19/2012        Mitral regurgitation ICD-10-CM: I34.0  ICD-9-CM: 424.0  11/19/2012        BP (high blood pressure) ICD-10-CM: I10  ICD-9-CM: 401.9  7/30/2012        Single implantable cardioverter-defibrillator in situ ICD-10-CM: Z95.810  ICD-9-CM: V45.02  2/1/2012    Overview Signed 2/1/2012  7:00 AM by MD Leonard Arreaga single lead single coil AICD implant 1/30/2012             Cardiomyopathy, dilated, nonischemic (HCC) ICD-10-CM: I42.9  ICD-9-CM: 425.4  Unknown        Morbid obesity ([address] ICD-10-CM: E66.01  ICD-9-CM: 278.01  1/17/2012        Observed sleep apnea ICD-10-CM: G47.30  ICD-9-CM: 780.57  1/17/2012        Mitral valve disorder ICD-10-CM: I05.9  ICD-9-CM: 394.9  1/6/2012        Palpitations ICD-10-CM: R00.2  ICD-9-CM: 785.1  12/22/2011        Syncope and collapse ICD-10-CM: R55  ICD-9-CM: 780.2  12/22/2011        Cardiomyopathy, peripartum, postpartum ICD-10-CM: O90.3  ICD-9-CM: 674.54  12/22/2011        Heart murmur ICD-10-CM: R01.1  ICD-9-CM: 785.2  12/22/2011                Current Outpatient Prescriptions   Medication Sig Dispense Refill    carvedilol (COREG) 25 mg tablet Take 1 Tab by mouth two (2) times daily (with meals). 180 Tab 2    losartan (COZAAR) 100 mg tablet TAKE 1 TABLET BY MOUTH DAILY 90 Tab 2    bumetanide (BUMEX) 2 mg tablet TAKE 1 TABLET BY MOUTH TWICE DAILY 180 Tab 2    carvedilol (COREG) 6.25 mg tablet Take 1 Tab by mouth two (2) times daily (with meals). 90 Tab 2    ferrous sulfate (IRON) 325 mg (65 mg iron) tablet Take  by mouth Daily (before breakfast).  b complex vitamins Liqd Take  by mouth daily.  multivitamin (ONE A DAY) tablet Take 1 Tab by mouth daily.  Calcium-Cholecalciferol, D3, (CALCIUM 600 WITH VITAMIN D3) 600 mg(1,500mg) -400 unit Chew Take  by mouth two (2) times a day.        Allergies   Allergen Reactions    Bee Sting [Sting, Bee] Anaphylaxis    Lisinopril Cough     Past Medical History:   Diagnosis Date    Arrhythmia     BRADYCARDIA    Cardiomyopathy, dilated, nonischemic (HCC)     Congestive heart failure, unspecified     Essential hypertension     Heart failure ([facility]     Hypertension     ICD (implantable cardiac defibrillator) in place     [facility] dual coil, single lead    Murmur     Syncope     Syncope and collapse 2011    TIA (transient ischemic attack)     Valvular heart disease      Past Surgical History:   Procedure Laterality Date    HX  SECTION      HX CHOLECYSTECTOMY      HX GASTRIC BYPASS      HX PACEMAKER      DEFIBRILLATOR    INS PPM/ICD LED SING ONLY  2012         BELLO DOPPLER  2013          No family history of sudden death. Social History   Substance Use Topics    Smoking status: Never Smoker    Smokeless tobacco: Never Used    Alcohol use No      Comment: RARE OCC      Review of Systems  Constitutional: Negative for fever, chills   HEENT: Negative for nosebleeds, acute vision changes. Respiratory: Negative for cough, hemoptysis, sputum production, and wheezing. Cardiovascular: Negative for orthopnea, claudication, PND. +  palpitations. Gastrointestinal: Negative for nausea, vomiting, blood in stool and melena. Genitourinary: Negative for dysuria, hematuria. Musculoskeletal: Negative for myalgias. Skin: Negative for rash and itching. Heme: Does not bleed or bruise easily. Neurological: Negative for speech change and focal weakness      Objective:     Visit Vitals    /70 (BP 1 Location: Left arm)    Pulse 64    Resp 18    Ht 5' 1\" (1.549 m)    Wt 224 lb 9.6 oz (101.9 kg)    SpO2 98%    BMI 42.44 kg/m2      Physical Exam:   General:  alert, cooperative, no distress, appears stated age    Neck: carotids upstroke normal bilaterally, no bruits, no JVD   Chest Wall: respiratory effort normal   Lung: clear to auscultation bilaterally   Heart:  normal rate and regular rhythm, hard to hear murmur    Abdomen: Soft, moderate obesity   Extremities: No leg edema             Skin: ICD site on the left shows keloid scar, no redness,          Assessment/Plan:       ICD-10-CM ICD-9-CM    1.  Single implantable cardioverter-defibrillator in situ Z95.810 V45.02    2. Dilated cardiomyopathy (HCC) I42.0 425.4    3. SVT (supraventricular tachycardia) (HCC) I47.1 427.89    4. Essential hypertension I10 401.9    5. Mitral valve insufficiency, unspecified etiology I34.0 424.0       Reviewed device check findings with her. Discussed EP study ablation and SVT ablation She is on GDMT, BP is tolerating the increased dose of Coreg but she is tired easily  She wants to have EP study and ablation. reviewed diet, exercise and weight control  reviewed medications and side effects in detail  ICD check shows proper function  She has single lead  Risks include but are not limited to bleeding in the groin, infection in the groin and/or heart valves, fistula between the groin arteries and veins, valvular damage, diaphragmatic paralysis, aortic dissection, heart attack, stroke, blood clot in the leg, pulmonary embolism, lung collapse (pneumothorax or hemothorax), heart collapse (pericardial tamponade), av block death. The added risks for left atrial ablation may be atrial-esophageal fistula, pulmonary vein stenoses, kidney failure (from contrast injection), higher risk of bleeding, stroke and heart attack. Elective or emergency surgery may be required to repair some of these complications. Prolonged hospitalization would be required. General anesthesia and transeptal catheterization may be required for the procedure  Follow-up Disposition:  Return in about 1 year (around 2/15/2019). Padilla Alaniz M.D.  [facility]  Electrophysiology/Cardiology  [facility]  [address] [facility], [address]  [phone]

## 2018-03-02 NOTE — DISCHARGE SUMMARY
Cardiology Discharge Summary               Patient ID:  Denys Wolfe  [id number]  89 y.o.  1985    Admit Date: 3/2/2018    Discharge Date: 3/2/2018     Admitting Physician: Akhil Woodward MD     Discharge Physician: Akhil Woodward MD    Admission Diagnoses: svt   EP study  Discharge Diagnoses: Principal Problem:    PSVT (paroxysmal supraventricular tachycardia) ([facility] [address] (3/2/2018)       Palpitations (12/22/2011)      Morbid obesity ([facility] [address] [facility] (1/17/2012)      Cardiomyopathy, dilated, nonischemic (HCC) ()      Single implantable cardioverter-defibrillator in situ (2/1/2012)      Overview: [facility] single lead single coil AICD implant 1/30/2012        Discharge Condition: Stable    Cardiology Procedures this Admission:  EP study    Hospital Course: the patient presented for EP study and there was no sustained SVT to map and hence no ablation  Her blood pressure was high  She is morbidly obese and the procedure was completed late so she wanted to monitor observe overnight for delayed bleeding  She had slight bleeding from the right groin when first got out of bed but that was stopped after compression by nurse and second walk overnight was fine  No hematoma  No recurrent bleeding      Consults: None    Discharge Exam:     Visit Vitals    /60 (BP 1 Location: Right arm, BP Patient Position: At rest)    Pulse 61    Temp 98.5 °F (36.9 °C)    Resp 18    Ht 5' 2\" (1.575 m)    Wt 225 lb (102.1 kg)    SpO2 96%    Breastfeeding No    BMI 41.15 kg/m2       General Appearance:  Well developed, well nourished,alert and oriented x 3, and individual in no acute distress. Ears/Nose/Mouth/Throat:   Hearing grossly normal.         Neck: Supple. Chest:   Lungs clear to auscultation bilaterally. Cardiovascular:  Regular rhythm, no murmur. Abdomen:   Soft, morbidly obese   Extremities: No edema bilaterally. Right groin no hematoma or bleeding   Skin: Warm and dry.                Disposition: home    Patient Instructions: Current Discharge Medication List      CONTINUE these medications which have NOT CHANGED    Details   losartan (COZAAR) 100 mg tablet TAKE 1 TABLET BY MOUTH DAILY  Qty: 90 Tab, Refills: 2    Associated Diagnoses: Essential hypertension with goal blood pressure less than 130/80; Cardiomyopathy, dilated, nonischemic ([address] Palpitations; Cardiomyopathy, peripartum, postpartum; Essential hypertension      ferrous sulfate (IRON) 325 mg (65 mg iron) tablet Take  by mouth Daily (before breakfast). b complex vitamins Liqd Take  by mouth daily. multivitamin (ONE A DAY) tablet Take 1 Tab by mouth daily. Calcium-Cholecalciferol, D3, (CALCIUM 600 WITH VITAMIN D3) 600 mg(1,500mg) -400 unit Chew Take  by mouth two (2) times a day. !! carvedilol (COREG) 25 mg tablet Take 1 Tab by mouth two (2) times daily (with meals). Qty: 180 Tab, Refills: 2    Comments: Also adding an additional 6.25 mg bid  Associated Diagnoses: Palpitations; Cardiomyopathy, dilated, nonischemic ([address] Cardiomyopathy, peripartum, postpartum; Essential hypertension; Essential hypertension with goal blood pressure less than 130/80      bumetanide (BUMEX) 2 mg tablet TAKE 1 TABLET BY MOUTH TWICE DAILY  Qty: 180 Tab, Refills: 2    Comments: **Patient requests 90 days supply**  Associated Diagnoses: Palpitations; Cardiomyopathy, dilated, nonischemic ([address] Cardiomyopathy, peripartum, postpartum; Essential hypertension; Essential hypertension with goal blood pressure less than 130/80      !! carvedilol (COREG) 6.25 mg tablet Take 1 Tab by mouth two (2) times daily (with meals). Qty: 90 Tab, Refills: 2    Comments: Also taking 25 mg bid  Associated Diagnoses: Palpitations; Cardiomyopathy, dilated, nonischemic ([address] Cardiomyopathy, peripartum, postpartum; Essential hypertension; Essential hypertension with goal blood pressure less than 130/80       ! ! - Potential duplicate medications found. Please discuss with provider.    She takes both to control PSVT      Referenced discharge instructions provided by nursing for diet and activity.     Follow-up with   Future Appointments  Date Time Provider Shanae Vargas   3/15/2018 3:30 PM [email or web address], [address]   3/15/2018 3:40 PM Doreen Galvan  [address]   4/10/2018 8:20 AM Cristopher Zhang  [address]   5/21/2018 2:00 PM Mireya Duarte OSOR[facility] SCHED   8/22/2018 11:45 AM [email or web address] OSORIO SCHED   11/28/2018 10:45 AM REMOTE1, [address]   3/12/2019 3:20 PM Doreen Galvan  [address]   3/12/2019 3:30 PM [id number], [address]       Signed:  Doreen Galvan MD  3/2/2018  5:57 PM

## 2018-03-02 NOTE — INTERVAL H&P NOTE
H&P Update:  Oralia De La Cruz was seen and examined. History and physical has been reviewed. The patient has been examined.  There have been no significant clinical changes since the completion of the originally dated History and Physical.    Signed By: Izell Cowden, MD     March 2, 2018 3:33 PM

## 2018-03-03 VITALS
OXYGEN SATURATION: 100 % | HEART RATE: 61 BPM | HEIGHT: 62 IN | TEMPERATURE: 97.8 F | WEIGHT: 222.88 LBS | BODY MASS INDEX: 41.02 KG/M2 | SYSTOLIC BLOOD PRESSURE: 111 MMHG | DIASTOLIC BLOOD PRESSURE: 81 MMHG | RESPIRATION RATE: 18 BRPM

## 2018-03-03 PROCEDURE — 74011250637 HC RX REV CODE- 250/637: Performed by: INTERNAL MEDICINE

## 2018-03-03 PROCEDURE — 99218 HC RM OBSERVATION: CPT

## 2018-03-03 RX ADMIN — Medication 10 ML: at 06:00

## 2018-03-03 RX ADMIN — CARVEDILOL 6.25 MG: 6.25 TABLET, FILM COATED ORAL at 09:23

## 2018-03-03 RX ADMIN — LOSARTAN POTASSIUM 100 MG: 50 TABLET ORAL at 09:22

## 2018-03-03 RX ADMIN — CARVEDILOL 25 MG: 12.5 TABLET, FILM COATED ORAL at 09:22

## 2018-03-03 NOTE — PROGRESS NOTES
1930: Bedside shift change report given to [address] [place] [address] Ricarda (oncoming nurse) by Liliya Olea (offgoing nurse). Report included the following information SBAR, Intake/Output, MAR and Cardiac Rhythm NSR. Right groin site assessed. Clean, dry, and intact. Pt off bed rest at 2000. Pt instructed to use call bell when ready to get out of bed. 2130: Pt assisted to the restroom. When pt returned to bed R groin site assessed. New bleeding noted. Pt placed flat and held manual pressure x10 minutes and new dressing applied. Pt placed back on bed rest x1 hour. Pt educated on importance of staying in bed at this time. Pt states understanding. 2300: Pt up to the bathroom again. No bleeding noted. Dressing clean dry and intact. 0730: Bedside shift change report given to Cayetano Harp (oncoming nurse) by [address] [place] [address] Ricarda (offgoing nurse). Report included the following information SBAR, Intake/Output, MAR, Med Rec Status and Cardiac Rhythm NSR. Problem: Cath Lab Procedures: Post-Cath Day of Procedure (Initiate SCIP Measures for Post-Op Care)  Goal: Activity/Safety  Outcome: Progressing Towards Goal  Pt up with 1 person. Goal: Nutrition/Diet  Outcome: Progressing Towards Goal  Pt on cardiac diet. Goal: Respiratory  Outcome: Progressing Towards Goal  Pt on room air.   Goal: *Hemodynamically stable  Outcome: Progressing Towards Goal  Patient Vitals for the past 12 hrs:   Temp Pulse Resp BP SpO2   03/02/18 2353 98.9 °F (37.2 °C) 94 16 114/68 98 %   03/02/18 2105 98.4 °F (36.9 °C) 76 16 108/66 97 %   03/02/18 2011 98.5 °F (36.9 °C) 89 18 114/62 96 %   03/02/18 1919 97.9 °F (36.6 °C) 67 16 (!) 153/102 100 %   03/02/18 1845 - 69 12 (!) 150/97 100 %   03/02/18 1830 - 72 16 (!) 150/96 100 %   03/02/18 1815 - 72 15 (!) 157/108 100 %   03/02/18 1800 - 78 19 (!) 150/98 100 %   03/02/18 1745 - 73 20 (!) 154/92 100 %   03/02/18 1730 - 70 20 (!) 150/103 100 %   03/02/18 1716 - 70 20 (!) 150/104 100 %   03/02/18 1710 98.4 °F (36.9 °C) 70 20 143/79 100 %   03/02/18 1430 98.4 °F (36.9 °C) 67 20 159/90 100 %       Problem: Falls - Risk of  Goal: *Absence of Falls  Document Kalpana Fall Risk and appropriate interventions in the flowsheet.    Outcome: Progressing Towards Goal  Fall Risk Interventions:            Medication Interventions: Patient to call before getting OOB, Teach patient to arise slowly

## 2018-03-03 NOTE — PROGRESS NOTES
0730  Bedside shift change report given to Autumn Bustillo RN  (oncoming nurse) by Denice Hamman, RN  (offgoing nurse). Report included the following information SBAR, Kardex, Procedure Summary, Intake/Output, MAR, Recent Results and Med Rec Status. 1000 Educated patient on discharge, followup, and medication instructions. Time given for questions. IV and telemetry removed. Patient packed room.

## 2018-03-03 NOTE — PROGRESS NOTES
TRANSFER - IN REPORT:    Verbal report received from [facility] on [address]  being received from procedure for routine progression of care. Report consisted of patients Situation, Background, Assessment and Recommendations(SBAR). Information from the following report(s) Procedure Summary, MAR, Accordion and Recent Results was reviewed with the receiving clinician. Opportunity for questions and clarification was provided. Assessment completed upon patients arrival to [address] [place] [address] and care assumed. Cardiac Cath Lab Recovery Arrival Note:    [address] arrived to [facility] recovery area. Patient procedure= EPS/unable to ablate. Patient on cardiac monitor, non-invasive blood pressure, SPO2 monitor. On room air. IV  of nacl on pump at 25 ml/hr. Patient status doing well without problems. Patient is A&Ox 4. Patient reports no complaints. PROCEDURE SITE CHECK:    Procedure site:without any bleeding and or hematoma, no pain/discomfort reported at procedure site. No change in patient status. Continue to monitor patient and status.     Dr Ede Iglesias talked with pt and parents

## 2018-03-03 NOTE — PROGRESS NOTES
Pt refused Bumex til able to get OOB to use the bathroom.   Medicated for right groin pain, no swelling @ site dsg D&I

## 2018-03-03 NOTE — DISCHARGE INSTRUCTIONS
Patient Instructions Post-EP study     1. No heavy lifting or exercises for 1 week. This includes the following:  Do not push or move furniture, jog or run    2. Do not drive for 1 day     3. Call Dr. Evangelina Contreras at [phone] if you experience any of the following symptoms:  Dizziness, lightheadedness, fainting spells  Lack of energy  Shortness of breath  Rapid heart rate  Chest or muscle twitches  Blurry vision, double vision, weakness, numbness  Nausea, vomiting  Fever  Bleeding in the stool, black stool  Leg swelling, pain    4.   Follow-up with Dr. Gissel Liao  Date Time Provider [facility]   3/15/2018 3:30  [place], [address]   3/15/2018 3:40 PM MD Suyapa Campoverde   4/10/2018 8:20 AM MD Suyapa Fabian   5/21/2018 2:00 PM Belem Edwards [facility] SCHED   8/22/2018 11:45 AM REMOTE1, Issac Reyes [facility] SCHED   11/28/2018 10:45 AM REMOTE1, [address]   3/12/2019 3:20 PM MD Suyapa Campoverde   3/12/2019 3:30  [place], [address]

## 2018-03-03 NOTE — PROGRESS NOTES
TRANSFER - OUT REPORT:    Verbal report given to Crystal BORGES(name) on Genna Payan  being transferred to [facility](unit) for routine progression of care       Report consisted of patients Situation, Background, Assessment and   Recommendations(SBAR). Information from the following report(s) SBAR, Kardex, Procedure Summary, Intake/Output, MAR, Accordion and Cardiac Rhythm SR was reviewed with the receiving nurse. Lines:   Peripheral IV 03/02/18 Left Antecubital (Active)        Opportunity for questions and clarification was provided. Patient transported with:   Monitor  Registered Nurse   2869 trasfered to room 470 via stretcher, rec'd by Julita Carrillo RN  Right groin dsg D&I area soft, states very little discomfort in right groin.

## 2018-03-03 NOTE — PROGRESS NOTES
1919 - received to [facility] (room 470) from the Cath lab, tele in place and verified with monitor tech, no s/s bleeding or hematoma at right groin cath site; will continue to monitor.

## 2018-03-15 ENCOUNTER — OFFICE VISIT (OUTPATIENT)
Dept: CARDIOLOGY CLINIC | Age: 33
End: 2018-03-15

## 2018-03-15 DIAGNOSIS — I47.1 SVT (SUPRAVENTRICULAR TACHYCARDIA) (HCC): Primary | ICD-10-CM

## 2018-03-16 ENCOUNTER — TELEPHONE (OUTPATIENT)
Dept: CARDIOLOGY CLINIC | Age: 33
End: 2018-03-16

## 2018-03-16 NOTE — TELEPHONE ENCOUNTER
Pt missed her two week follow up on 3/15 from her ablation due to some other issues she's having. Pt would like to come and see Dr. Zehra Ventura before she has her Endoscopy done on 3/21. Pt can be reached @ [phone]. Thanks!   Ronnie De Oliveira

## 2018-03-19 ENCOUNTER — OFFICE VISIT (OUTPATIENT)
Dept: CARDIOLOGY CLINIC | Age: 33
End: 2018-03-19

## 2018-03-19 VITALS
BODY MASS INDEX: 41.59 KG/M2 | HEART RATE: 52 BPM | HEIGHT: 62 IN | WEIGHT: 226 LBS | SYSTOLIC BLOOD PRESSURE: 130 MMHG | DIASTOLIC BLOOD PRESSURE: 88 MMHG

## 2018-03-19 DIAGNOSIS — I47.1 PSVT (PAROXYSMAL SUPRAVENTRICULAR TACHYCARDIA) (HCC): Primary | ICD-10-CM

## 2018-03-19 DIAGNOSIS — I42.0 CARDIOMYOPATHY, DILATED, NONISCHEMIC (HCC): ICD-10-CM

## 2018-03-19 DIAGNOSIS — Z95.810 SINGLE IMPLANTABLE CARDIOVERTER-DEFIBRILLATOR IN SITU: ICD-10-CM

## 2018-03-19 DIAGNOSIS — R00.1 SINUS BRADYCARDIA BY ELECTROCARDIOGRAM: ICD-10-CM

## 2018-03-19 DIAGNOSIS — E66.01 OBESITY, CLASS III, BMI 40-49.9 (MORBID OBESITY) (HCC): ICD-10-CM

## 2018-03-19 RX ORDER — HYDROCODONE BITARTRATE AND ACETAMINOPHEN 5; 325 MG/1; MG/1
TABLET ORAL
COMMUNITY
End: 2018-03-20

## 2018-03-19 RX ORDER — ONDANSETRON 4 MG/1
4 TABLET, ORALLY DISINTEGRATING ORAL
COMMUNITY
End: 2018-03-20

## 2018-03-19 RX ORDER — DICYCLOMINE HYDROCHLORIDE 10 MG/1
10 CAPSULE ORAL
COMMUNITY
End: 2018-03-20

## 2018-03-19 RX ORDER — NITROFURANTOIN (MACROCRYSTALS) 100 MG/1
100 CAPSULE ORAL 2 TIMES DAILY
COMMUNITY
End: 2018-03-20

## 2018-03-19 NOTE — TELEPHONE ENCOUNTER
Verified patient with two types of identifiers. Scheduled appt for today at 2:20pm.  Patient verbalizes understanding. And will call with any questions or concerns.

## 2018-03-19 NOTE — LETTER
NOTIFICATION FOR WORK  
 
3/19/2018 3:06 PM 
 
Ms. Saritha Rodas [address] [facility] [id number] To Whom It May Concern: 
 
Saritha Rodas is currently under the care of [address] [place], [place] [address]chau WEBB. She was at an appointment on: 3/19/18 If there are questions or concerns please have the patient contact our office.  
 
 
 
Sincerely, 
 
 
Luanne Robb MD

## 2018-03-19 NOTE — PROGRESS NOTES
Subjective:      Oralia De La Cruz is a 34 y.o. female who is seen today after EP study earlier this month; there was no sustained SVT to map, and therefore no ablation was performed. Post-procedure, she had slight bleeding from the RFV site when she first got out of bed, but was easily controlled with manual compression & did not recur. She denies site complications since discharge. Since discharge, she has had no further episodes of palpitations. However, she did have an ED visit for flank & abdominal pain; she was treated with Nitrofurantoin, Omeprazole, Bentyl, & Omeprazole. She has had slight improvement in her pain. She is scheduled for upper & lower endoscopy this Wednesday with Dr. Fantasma Yeh for further evaluation. She is currently on the max dose of Coreg. Patient takes additional dose of coreg to control symptoms, and she is getting easily fatigued. Her echocardiogram January 2018 showed left ventricular ejection fraction 30%. She has moderate right and left atrial enlargement and moderate mitral valve regurgitation. In the past:  She had ICD placed due to syncope with cardiomyopathy  Diagnosed with postpartum cardiomyopathy in 2008. She is followed by Dr. Caroline Weinberg. NYHA class II. Echo 11/2014 EF 15%, moderate MR, MD.moderate to severe TR  She had ICD lead fractured (she was not compliant and followed up regularly at the time) and lead was extracted and reimplanted by me.     Problem List  Date Reviewed: 3/19/2018          Codes Class Noted    S/P ablation of ventricular arrhythmia ICD-10-CM: Z98.890, Z86.79  ICD-9-CM: V45.89  3/2/2018        PSVT (paroxysmal supraventricular tachycardia) (HCC) ICD-10-CM: I47.1  ICD-9-CM: 427.0  3/2/2018        Tricuspid inspiratory pansystolic murmur NOLAN-40-PT: I07.9  ICD-9-CM: 785.2  10/17/2013        Pulmonary hypertension ICD-10-CM: I27.20  ICD-9-CM: 416.8  10/17/2013        Headache(784.0) ICD-10-CM: R51  ICD-9-CM: 784.0  4/17/2013        Abnormal EKG ICD-10-CM: R94.31  ICD-9-CM: 794.31  11/19/2012        Mitral regurgitation ICD-10-CM: I34.0  ICD-9-CM: 424.0  11/19/2012        BP (high blood pressure) ICD-10-CM: I10  ICD-9-CM: 401.9  7/30/2012        Single implantable cardioverter-defibrillator in situ ICD-10-CM: Z95.810  ICD-9-CM: V45.02  2/1/2012    Overview Signed 2/1/2012  7:00 AM by MD Vinnie Velásquez John single lead single coil AICD implant 1/30/2012             Cardiomyopathy, dilated, nonischemic (HCC) ICD-10-CM: I42.9  ICD-9-CM: 425.4  Unknown        Morbid obesity ([address] ICD-10-CM: E66.01  ICD-9-CM: 278.01  1/17/2012        Observed sleep apnea ICD-10-CM: G47.30  ICD-9-CM: 780.57  1/17/2012        Mitral valve disorder ICD-10-CM: I05.9  ICD-9-CM: 394.9  1/6/2012        Palpitations ICD-10-CM: R00.2  ICD-9-CM: 785.1  12/22/2011        Syncope and collapse ICD-10-CM: R55  ICD-9-CM: 780.2  12/22/2011        Cardiomyopathy, peripartum, postpartum ICD-10-CM: O90.3  ICD-9-CM: 674.54  12/22/2011        Heart murmur ICD-10-CM: R01.1  ICD-9-CM: 785.2  12/22/2011                Current Outpatient Prescriptions   Medication Sig Dispense Refill    ondansetron (ZOFRAN ODT) 4 mg disintegrating tablet Take 4 mg by mouth every eight (8) hours as needed for Nausea.  nitrofurantoin (MACRODANTIN) 100 mg capsule Take 100 mg by mouth two (2) times a day.  HYDROcodone-acetaminophen (NORCO) 5-325 mg per tablet Take  by mouth.  dicyclomine (BENTYL) 10 mg capsule Take 10 mg by mouth three (3) times daily as needed.  carvedilol (COREG) 25 mg tablet Take 1 Tab by mouth two (2) times daily (with meals). 180 Tab 2    losartan (COZAAR) 100 mg tablet TAKE 1 TABLET BY MOUTH DAILY 90 Tab 2    bumetanide (BUMEX) 2 mg tablet TAKE 1 TABLET BY MOUTH TWICE DAILY 180 Tab 2    ferrous sulfate (IRON) 325 mg (65 mg iron) tablet Take  by mouth Daily (before breakfast).  b complex vitamins Liqd Take  by mouth daily.       multivitamin (ONE A DAY) tablet Take 1 Tab by mouth daily.  Calcium-Cholecalciferol, D3, (CALCIUM 600 WITH VITAMIN D3) 600 mg(1,500mg) -400 unit Chew Take  by mouth two (2) times a day. Allergies   Allergen Reactions    Bee Sting [Sting, Bee] Anaphylaxis    Lisinopril Cough     Past Medical History:   Diagnosis Date    Arrhythmia     BRADYCARDIA    Cardiomyopathy, dilated, nonischemic (HCC)     Congestive heart failure, unspecified     Essential hypertension     Heart failure ([facility] [address]     Hypertension     ICD (implantable cardiac defibrillator) in place     [facility] dual coil, single lead    Murmur     Syncope     Syncope and collapse 2011    TIA (transient ischemic attack)     Valvular heart disease      Past Surgical History:   Procedure Laterality Date    HX  SECTION      HX CHOLECYSTECTOMY      HX GASTRIC BYPASS      HX PACEMAKER      DEFIBRILLATOR    INS PPM/ICD LED SING ONLY  2012         UT COMPRE ELECTROPHYSIOL XM W/LEFT ATRIAL PACNG/REC  3/2/2018         UT COMPRE ELECTROPHYSIOLOGIC ARRHYTHMIA INDUCTION  3/2/2018         STIM/PACING HEART POST IV DRUG INFU  3/2/2018         BELLO DOPPLER  2013          No family history of sudden death. Social History   Substance Use Topics    Smoking status: Never Smoker    Smokeless tobacco: Never Used    Alcohol use No      Comment: RARE OCC      Review of Systems  Constitutional: Negative for fever, chills   HEENT: Negative for nosebleeds, acute vision changes. Respiratory: Negative for cough, hemoptysis, sputum production, and wheezing. Cardiovascular: Negative for orthopnea, claudication, PND. + no worse palpitations. Gastrointestinal: Negative for nausea, vomiting, blood in stool and melena. Genitourinary: Negative for dysuria, hematuria. Musculoskeletal: Negative for myalgias. Skin: Negative for rash and itching. Heme: Does not bleed or bruise easily.    Neurological: Negative for speech change and focal weakness Objective:     Visit Vitals    /88 (BP 1 Location: Left arm, BP Patient Position: Sitting)    Pulse (!) 52    Ht 5' 2\" (1.575 m)    Wt 226 lb (102.5 kg)    BMI 41.34 kg/m2      Physical Exam:   General:  alert, cooperative, no distress, appears stated age    Neck: carotids upstroke normal bilaterally, no bruits, no JVD   Chest Wall: respiratory effort normal   Lung: clear to auscultation bilaterally   Heart:  normal rate and regular rhythm, hard to hear murmur    Abdomen: Soft, moderate obesity   Extremities: No leg edema             Skin: ICD site on the left shows keloid scar, no redness. RFV site well-healed, no hematoma. ECG sinus bradycardia and nonspecific T wave changes    Assessment/Plan:       ICD-10-CM ICD-9-CM    1. PSVT (paroxysmal supraventricular tachycardia) ([facility]) I47.1 427.0 AMB POC EKG ROUTINE W/ 12 LEADS, INTER & REP   2. Cardiomyopathy, dilated, nonischemic ([facility]) I42.9 425.4    3. Single implantable cardioverter-defibrillator in situ Z95.810 V45.02    4. Obesity, Class III, BMI 40-49.9 (morbid obesity) ([facility]) E66.01 278.01    5. Sinus bradycardia by electrocardiogram R00.1 427.89      Reviewed inability to induce/map arrhythmia/PAT during EP study, & she verbalizes understanding. She had PAC and nonsustained right sided atrial flutter which was not her clinical SVT seen on ICD check  Continue current GDMT, as she is tolerating medications well. Consider admission for tikosyn if she cannot tolerate more coreg and has worse palpitation and also will consider another EP study and ablation if cannot control  She will continue with GI eval as scheduled as this may help her arrhythmia. Follow up with Dr. Inna Marroquin as noted below. Remote transmissions as noted below.     Future Appointments  Date Time Provider Shanae Vargas   4/10/2018 8:20 AM Bunny Lagunas  [address]   5/21/2018 2:00 PM [address], [address] Jose Gutierrez   8/22/2018 11:45 AM REMOTEJoellen Sandoval [facility] SCHED   11/28/2018 10:45 AM REMOTE1, [address]   3/12/2019 3:20 PM Chelsey Padgett  [address]   3/12/2019 3:30 PM PACEMAKER3, Helga Mix M.D.  [facility]  Electrophysiology/Cardiology  [facility] [place]  [address] , [address]  [address]  [phone]

## 2018-03-19 NOTE — PROGRESS NOTES
Chief Complaint   Patient presents with    SVT    Follow-up     from ablation     Verified patient with two types of identifiers. Verified medications with the patient. Verified pharmacy with patient.

## 2018-03-19 NOTE — MR AVS SNAPSHOT
[address] [facility] [address] [facility] [address] [place] 
[phone] Patient: Wesly Rogers MRN: [id number] EHI:[id number] Visit Information Date & Time Provider Department Dept. Phone Encounter #  
 3/19/2018  2:20 PM Aparna Centeno MD [facility] Bernice Mcclellan [id number] Your Appointments 4/10/2018  8:20 AM  
ESTABLISHED PATIENT with Sushma Guaman MD  
[facility] ([facility] SCHEDULING) Appt Note: 3-4 mo f/u per Dr. Ze Quinonez [address]  [address] [place]  
[address] [facility]  
  
    
 3/12/2019  3:20 PM  
ESTABLISHED PATIENT with Aparna Centeno MD  
[facility] ([facility]) Appt Note: sjm ICD/rc/Sandoval b   [address] [place] [address]  
[address] [address]  
  
    
 3/12/2019  3:30 PM  
PACEMAKER with Luca Olguin [facility] ([facility] SCHEDULING) Appt Note: sjm ICD/rc/Sandoval b   [address] [address]  
[address] [address]  
  
    
  
 5/21/2018  2:00 PM  
REMOTE OFFICE VISIT with Joe Drummond [facility] ([facility] SCHEDULING) Appt Note: merlin ICD/rc b2-15-18  
 [address]  [address] [place]  
[address] [address]  
  
    
 8/22/2018 11:45 AM  
REMOTE OFFICE VISIT with Joe Drummond [facility] ([facility] SCHEDULING) Appt Note: merlin ICD/rc b  
 [address] [place]  
[phone]  
  
    
 11/28/2018 10:45 AM  
REMOTE OFFICE VISIT with Joe Drummond [facility] ([facility] SCHEDULING) Appt Note: merlin ICD/rc b  
 [address] [place] [address]  
[phone] Upcoming Health Maintenance Date Due DTaP/Tdap/Td series (1 - Tdap) 8/24/2005 PAP AKA CERVICAL CYTOLOGY 8/24/2005 Influenza Age 5 to Adult 8/1/2017 Allergies as of 3/19/2018  Review Complete On: 3/19/2018 By: Baron Sagar MD  
  
 Severity Noted Reaction Type Reactions Bee Sting [Sting, Bee] High 11/01/2012    Anaphylaxis Lisinopril  01/25/2012   Side Effect Cough Current Immunizations  Reviewed on 2/2/2016 Name Date Influenza Vaccine Split 11/1/2011 ZZZ-RETIRED (DO NOT USE) Pneumococcal Vaccine (Unspecified Type) 11/1/2011 Not reviewed this visit You Were Diagnosed With   
  
 Codes Comments PSVT (paroxysmal supraventricular tachycardia) (HCC)    -  Primary ICD-10-CM: I47.1 ICD-9-CM: 427.0 Cardiomyopathy, dilated, nonischemic (HCC)     ICD-10-CM: I42.9 ICD-9-CM: 425.4 Single implantable cardioverter-defibrillator in situ     ICD-10-CM: Z95.810 ICD-9-CM: V45.02 Obesity, Class III, BMI 40-49.9 (morbid obesity) (HCC)     ICD-10-CM: E66.01 
ICD-9-CM: 278.01 Vitals BP Pulse Height(growth percentile) Weight(growth percentile) BMI OB Status 130/88 (BP 1 Location: Left arm, BP Patient Position: Sitting) (!) 52 5' 2\" (1.575 m) 226 lb (102.5 kg) 41.34 kg/m2 IUD Smoking Status Never Smoker Vitals History BMI and BSA Data Body Mass Index Body Surface Area  
 41.34 kg/m 2 2.12 m 2 Preferred Pharmacy Pharmacy Name Phone [facility] [address], [address] [facility] [phone] Your Updated Medication List  
  
   
This list is accurate as of 3/19/18  3:07 PM.  Always use your most recent med list.  
  
  
  
  
 b complex vitamins Liqd Take  by mouth daily. bumetanide 2 mg tablet Commonly known as:  Juan Manuel Su TAKE 1 TABLET BY MOUTH TWICE DAILY CALCIUM 600 WITH VITAMIN D3 600 mg(1,500mg) -400 unit Chew Generic drug:  Calcium-Cholecalciferol (D3) Take  by mouth two (2) times a day. carvedilol 25 mg tablet Commonly known as:  Elvia Sarmiento Take 1 Tab by mouth two (2) times daily (with meals). dicyclomine 10 mg capsule Commonly known as:  BENTYL Take 10 mg by mouth three (3) times daily as needed. HYDROcodone-acetaminophen 5-325 mg per tablet Commonly known as:  Jacki Helms Take  by mouth. Iron 325 mg (65 mg iron) tablet Generic drug:  ferrous sulfate Take  by mouth Daily (before breakfast). losartan 100 mg tablet Commonly known as:  COZAAR  
TAKE 1 TABLET BY MOUTH DAILY MACRODANTIN 100 mg capsule Generic drug:  nitrofurantoin Take 100 mg by mouth two (2) times a day. multivitamin tablet Commonly known as:  ONE A DAY Take 1 Tab by mouth daily. ZOFRAN ODT 4 mg disintegrating tablet Generic drug:  ondansetron Take 4 mg by mouth every eight (8) hours as needed for Nausea. We Performed the Following AMB POC EKG ROUTINE W/ 12 LEADS, INTER & REP [33820 CPT(R)] Introducing [facility] SERVICES! Dear Hattie Hernandez: 
Thank you for requesting a VR1 account. Our records indicate that you already have an active VR1 account. You can access your account anytime at [email or web address] Did you know that you can access your hospital and ER discharge instructions at any time in VR1? You can also review all of your test results from your hospital stay or ER visit. Additional Information If you have questions, please visit the Frequently Asked Questions section of the VR1 website at [email or web address]/. Remember, VR1 is NOT to be used for urgent needs. For medical emergencies, dial 911. Now available from your iPhone and Android! Please provide this summary of care documentation to your next provider. If you have any questions after today's visit, please call [phone].

## 2018-03-20 RX ORDER — CARVEDILOL 6.25 MG/1
6.25 TABLET ORAL 2 TIMES DAILY
COMMUNITY
End: 2020-02-27

## 2018-03-21 ENCOUNTER — HOSPITAL ENCOUNTER (OUTPATIENT)
Age: 33
Setting detail: OUTPATIENT SURGERY
Discharge: HOME | End: 2018-03-21
Attending: INTERNAL MEDICINE | Admitting: INTERNAL MEDICINE
Payer: MEDICAID

## 2018-03-21 ENCOUNTER — ANESTHESIA EVENT (OUTPATIENT)
Dept: ENDOSCOPY | Age: 33
End: 2018-03-21
Payer: MEDICAID

## 2018-03-21 ENCOUNTER — ANESTHESIA (OUTPATIENT)
Dept: ENDOSCOPY | Age: 33
End: 2018-03-21
Payer: MEDICAID

## 2018-03-21 VITALS
HEART RATE: 54 BPM | SYSTOLIC BLOOD PRESSURE: 156 MMHG | DIASTOLIC BLOOD PRESSURE: 84 MMHG | TEMPERATURE: 97.8 F | WEIGHT: 225 LBS | OXYGEN SATURATION: 100 % | RESPIRATION RATE: 22 BRPM | BODY MASS INDEX: 41.15 KG/M2

## 2018-03-21 LAB
ALBUMIN SERPL-MCNC: 3.5 G/DL (ref 3.5–5)
ALBUMIN/GLOB SERPL: 0.9 {RATIO} (ref 1.1–2.2)
ALP SERPL-CCNC: 147 U/L (ref 45–117)
ALT SERPL-CCNC: 102 U/L (ref 12–78)
AST SERPL-CCNC: 37 U/L (ref 15–37)
BILIRUB DIRECT SERPL-MCNC: 0.3 MG/DL (ref 0–0.2)
BILIRUB SERPL-MCNC: 1 MG/DL (ref 0.2–1)
GLOBULIN SER CALC-MCNC: 3.7 G/DL (ref 2–4)
PROT SERPL-MCNC: 7.2 G/DL (ref 6.4–8.2)

## 2018-03-21 PROCEDURE — 77030027957 HC TBNG IRR ENDOGTR BUSS -B: Performed by: INTERNAL MEDICINE

## 2018-03-21 PROCEDURE — 76060000031 HC ANESTHESIA FIRST 0.5 HR: Performed by: INTERNAL MEDICINE

## 2018-03-21 PROCEDURE — 36415 COLL VENOUS BLD VENIPUNCTURE: CPT | Performed by: INTERNAL MEDICINE

## 2018-03-21 PROCEDURE — 74011000250 HC RX REV CODE- 250

## 2018-03-21 PROCEDURE — 74011250636 HC RX REV CODE- 250/636

## 2018-03-21 PROCEDURE — 80076 HEPATIC FUNCTION PANEL: CPT | Performed by: INTERNAL MEDICINE

## 2018-03-21 PROCEDURE — 76040000019: Performed by: INTERNAL MEDICINE

## 2018-03-21 RX ORDER — SODIUM CHLORIDE 9 MG/ML
INJECTION, SOLUTION INTRAVENOUS
Status: DISCONTINUED | OUTPATIENT
Start: 2018-03-21 | End: 2018-03-21 | Stop reason: HOSPADM

## 2018-03-21 RX ORDER — PROPOFOL 10 MG/ML
INJECTION, EMULSION INTRAVENOUS AS NEEDED
Status: DISCONTINUED | OUTPATIENT
Start: 2018-03-21 | End: 2018-03-21 | Stop reason: HOSPADM

## 2018-03-21 RX ORDER — LIDOCAINE HYDROCHLORIDE 20 MG/ML
INJECTION, SOLUTION EPIDURAL; INFILTRATION; INTRACAUDAL; PERINEURAL AS NEEDED
Status: DISCONTINUED | OUTPATIENT
Start: 2018-03-21 | End: 2018-03-21 | Stop reason: HOSPADM

## 2018-03-21 RX ADMIN — PROPOFOL 30 MG: 10 INJECTION, EMULSION INTRAVENOUS at 11:13

## 2018-03-21 RX ADMIN — PROPOFOL 50 MG: 10 INJECTION, EMULSION INTRAVENOUS at 11:29

## 2018-03-21 RX ADMIN — PROPOFOL 30 MG: 10 INJECTION, EMULSION INTRAVENOUS at 11:14

## 2018-03-21 RX ADMIN — SODIUM CHLORIDE: 9 INJECTION, SOLUTION INTRAVENOUS at 11:00

## 2018-03-21 RX ADMIN — PROPOFOL 50 MG: 10 INJECTION, EMULSION INTRAVENOUS at 11:27

## 2018-03-21 RX ADMIN — PROPOFOL 50 MG: 10 INJECTION, EMULSION INTRAVENOUS at 11:21

## 2018-03-21 RX ADMIN — LIDOCAINE HYDROCHLORIDE 40 MG: 20 INJECTION, SOLUTION EPIDURAL; INFILTRATION; INTRACAUDAL; PERINEURAL at 11:13

## 2018-03-21 RX ADMIN — PROPOFOL 30 MG: 10 INJECTION, EMULSION INTRAVENOUS at 11:15

## 2018-03-21 RX ADMIN — PROPOFOL 50 MG: 10 INJECTION, EMULSION INTRAVENOUS at 11:31

## 2018-03-21 RX ADMIN — PROPOFOL 50 MG: 10 INJECTION, EMULSION INTRAVENOUS at 11:23

## 2018-03-21 RX ADMIN — PROPOFOL 30 MG: 10 INJECTION, EMULSION INTRAVENOUS at 11:16

## 2018-03-21 RX ADMIN — PROPOFOL 50 MG: 10 INJECTION, EMULSION INTRAVENOUS at 11:25

## 2018-03-21 RX ADMIN — PROPOFOL 30 MG: 10 INJECTION, EMULSION INTRAVENOUS at 11:17

## 2018-03-21 RX ADMIN — PROPOFOL 50 MG: 10 INJECTION, EMULSION INTRAVENOUS at 11:33

## 2018-03-21 NOTE — DISCHARGE INSTRUCTIONS
[address] [facility]  [address] [address] [address], [place]  [id number]  1985    DISCOMFORT:  Redness at IV site- apply warm compress to area; if redness or soreness persist- contact your physician  There may be a slight amount of blood passed from the rectum  Gaseous discomfort- walking, belching will help relieve any discomfort  You may not operate a vehicle for 12 hours  You may not  engage in an occupation involving machinery or appliances for rest of today  You may not  drink alcoholic beverages for at least 12 hours  Avoid making any critical decisions for at least 24 hour    DIET:   High fiber diet. - however -  remember your colon is empty and a heavy meal will produce gas. Avoid these foods:  vegetables, fried / greasy foods, carbonated drinks for today      ACTIVITY  You may resume your normal daily activities   Spend the remainder of the day resting -  avoid any strenuous activity. CALL M.D. ANY SIGN OF   Increasing pain, nausea, vomiting  Abdominal distension (swelling)  New increased bleeding (oral or rectal)  Fever (chills)  Pain in chest area  Bloody discharge from nose or mouth  Shortness of breath    You may not  take any Advil, Aspirin, Ibuprofen, Motrin, Aleve, or Goodys for 5 days, ONLY  Tylenol as needed for pain. Post procedure diagnosis:  Normal EGD, hx Gastric Bypass      Follow-up Instructions:   Call Dr. Bia Richards for any questions or problems. If we took a biopsy please call the office within 2 weeks to discuss your                             pathology results.  Telephone # [phone]

## 2018-03-21 NOTE — H&P
[address]   [address]  [place], [address]  [phone]                                History and Physical     NAME: Evon Carlson   :  1985   MRN:  [id number]     HPI:  The patient was seen and examined. Past Surgical History:   Procedure Laterality Date    HX  SECTION      HX CHOLECYSTECTOMY      HX GASTRIC BYPASS      HX PACEMAKER      DEFIBRILLATOR    INS PPM/ICD LED SING ONLY  2012         MA COMPRE ELECTROPHYSIOL XM W/LEFT ATRIAL PACNG/REC  3/2/2018         MA COMPRE ELECTROPHYSIOLOGIC ARRHYTHMIA INDUCTION  3/2/2018         STIM/PACING HEART POST IV DRUG INFU  3/2/2018         BELLO DOPPLER  2013          Past Medical History:   Diagnosis Date    Arrhythmia     BRADYCARDIA    Cardiomyopathy, dilated, nonischemic (HCC)     Congestive heart failure, unspecified     Essential hypertension     Heart failure ([address]     Hypertension     ICD (implantable cardiac defibrillator) in place     [facility] John dual coil, single lead    Morbid obesity ([address]     Murmur     Syncope     Syncope and collapse 2011    TIA (transient ischemic attack)     Valvular heart disease      Social History   Substance Use Topics    Smoking status: Never Smoker    Smokeless tobacco: Never Used    Alcohol use No      Comment: socially     Allergies   Allergen Reactions    Bee Sting [Sting, Bee] Anaphylaxis    Lisinopril Cough     Family History   Problem Relation Age of Onset    Diabetes Paternal Grandmother      No current facility-administered medications for this encounter. PHYSICAL EXAM:  General: WD, WN. Alert, cooperative, no acute distress    HEENT: NC, Atraumatic. PERRLA, EOMI. Anicteric sclerae. Lungs:  CTA Bilaterally. No Wheezing/Rhonchi/Rales.   Heart:  Regular  rhythm,  No murmur, No Rubs, No Gallops  Abdomen: Soft, Non distended, Non tender.  +Bowel sounds, no HSM  Extremities: No c/c/e  Neurologic:  CN 2-12 gi, Alert and oriented X 3. No acute neurological distress   Psych:   Good insight. Not anxious nor agitated. The heart, lungs and mental status were satisfactory for the administration of MAC sedation and for the procedure.       Mallampati score: 3       Assessment:   · Epigastric pain  · Change in bowel habits    Plan:   · Endoscopic procedure  · MAC sedation   ·

## 2018-03-21 NOTE — PROCEDURES
[address]  [address], [address]  [phone]                           Colonoscopy and EGD Procedure Note      Indications:    Abdominal pain, generalized, Change in bowel habits, Nausea     :  Kenyon Maynard MD    Referring Provider: None    Sedation:  MAC anesthesia    Procedure Details:  After informed consent was obtained with all risks and benefits of procedure explained and preoperative exam completed, the patient was taken to the endoscopy suite and placed in the left lateral decubitus position. Upon sequential sedation as per above, a digital rectal exam was performed  And was normal.  The Olympus videocolonoscope  was inserted in the rectum and carefully advanced to the cecum, which was identified by the ileocecal valve and appendiceal orifice. The quality of preparation was good. The colonoscope was slowly withdrawn with careful evaluation between folds. Retroflexion in the rectum was performed and was normal..     Colon Findings:   Rectum: no mucosal lesion appreciated  Small internal hemorrhoids were seen  Sigmoid: no mucosal lesion appreciated  Descending Colon: no mucosal lesion appreciated  Transverse Colon: no mucosal lesion appreciated  Ascending Colon: no mucosal lesion appreciated  Cecum: no mucosal lesion appreciated  Terminal Ileum: not intubated      Following sequential administration of sedation as per above, the [id number] gastroscope was inserted into the mouth and advanced under direct vision to proximal jejunum. A careful inspection was made as the gastroscope was withdrawn, including a retroflexed view of the proximal stomach; findings and interventions are described below. EGD Findings:  Esophagus:normal  Stomach: Evidence of Vida-en-Y gastric bypass was present. No stenosis or ulceration was seen at the anastomosis. Gastro-jejunal anastomosis was wide open.    Duodenum/jejunum:normal      Therapies: none    Complications:   None; patient tolerated the procedure well. Impression:    See Postoperative diagnosis above    Recommendations:  -Continue acid suppression. , -Follow symptoms. , -High fiber diet. , -Follow up with me.  -Screening colonoscopy at age 38  -Resume normal medication(s). Interventions:  none    Complications: None. Specimen Removed:  * No specimens in log *    EBL:  None. Discharge Disposition:  Home in the company of a  when able to ambulate.     Milan Chirinos MD  3/21/2018  11:44 AM

## 2018-03-21 NOTE — ROUTINE PROCESS
Staci Calix  1985  [id number]    Situation:  Verbal report received from: Oh Lieberman RN  Procedure: Procedure(s):  ESOPHAGOGASTRODUODENOSCOPY (EGD)  COLONOSCOPY    Background:    Preoperative diagnosis: Abdominal pain  Change in bowels  Postoperative diagnosis: Normal EGD, hx Gastric Bypass      :  Dr. Yovani Casarez  Assistant(s): Endoscopy Technician-1: Surya Araiza  Endoscopy RN-1: Jami Jaime RN    Specimens: * No specimens in log *  H. Pylori  no    Assessment:  Intra-procedure medications       Anesthesia gave intra-procedure sedation and medications, see anesthesia flow sheet yes    Intravenous fluids: NS@ KVO     Vital signs stable     Abdominal assessment: round and soft     Recommendation:  Discharge patient per MD order  Family or Friend Parents  Permission to share finding with family or friend yes

## 2018-03-21 NOTE — PERIOP NOTES

## 2018-03-21 NOTE — ANESTHESIA POSTPROCEDURE EVALUATION
Post-Anesthesia Evaluation and Assessment    Patient: Brionna Roca MRN: [id number]  SSN: [id number]    YOB: 1985  Age: 34 y.o. Sex: female       Cardiovascular Function/Vital Signs  Visit Vitals    /84    Pulse (!) 54    Temp 36.6 °C (97.8 °F)    Resp 22    Wt 102.1 kg (225 lb)    SpO2 100%    BMI 41.15 kg/m2       Patient is status post MAC anesthesia for Procedure(s):  ESOPHAGOGASTRODUODENOSCOPY (EGD)  COLONOSCOPY. Nausea/Vomiting: None    Postoperative hydration reviewed and adequate. Pain:  Pain Scale 1: Numeric (0 - 10) (03/21/18 1156)  Pain Intensity 1: 5 (03/21/18 1156)   Managed    Neurological Status: At baseline    Mental Status and Level of Consciousness: Arousable    Pulmonary Status:   O2 Device: Room air (03/21/18 1156)   Adequate oxygenation and airway patent    Complications related to anesthesia: None    Post-anesthesia assessment completed.  No concerns    Signed By: Mu Perrin MD     March 21, 2018

## 2018-03-21 NOTE — ANESTHESIA PREPROCEDURE EVALUATION
Anesthetic History               Review of Systems / Medical History  Patient summary reviewed, nursing notes reviewed and pertinent labs reviewed    Pulmonary        Sleep apnea           Neuro/Psych       CVA  TIA     Cardiovascular    Hypertension        Dysrhythmias : PVC  Pacemaker    Exercise tolerance: >4 METS  Comments: EF 30%, diffuse hypokinesis  AICD    GI/Hepatic/Renal               Comments: Change in bowel habits  Ab pain Endo/Other        Morbid obesity     Other Findings            Physical Exam    Airway  Mallampati: I  TM Distance: > 6 cm  Neck ROM: normal range of motion   Mouth opening: Normal     Cardiovascular    Rhythm: regular  Rate: normal         Dental  No notable dental hx       Pulmonary  Breath sounds clear to auscultation               Abdominal         Other Findings            Anesthetic Plan    ASA: 4  Anesthesia type: MAC          Induction: Intravenous  Anesthetic plan and risks discussed with: Patient

## 2018-03-21 NOTE — IP AVS SNAPSHOT
[address] [facility] [address] [facility] 
[phone] Patient: Saira Hancock MRN: [id number] FOS:[birth year]23[birth year] About your hospitalization You were admitted on:  March 21, 2018 You last received care in the:  [facility] ENDOSCOPY You were discharged on:  March 21, 2018 Why you were hospitalized Your primary diagnosis was:  Not on File Follow-up Information None Your Scheduled Appointments Tuesday April 10, 2018  8:20 AM EDT  
ESTABLISHED PATIENT with Dianelys Rapp MD  
[facility] ([facility]) [address] [place] [address]  [address] [place] [address]  
[phone] Discharge Orders None A check liz indicates which time of day the medication should be taken. My Medications CONTINUE taking these medications Instructions Each Dose to Equal  
 Morning Noon Evening Bedtime  
 b complex vitamins Liqd Your last dose was: Your next dose is: Take  by mouth daily. bumetanide 2 mg tablet Commonly known as:  Halley Pedro Your last dose was: Your next dose is: TAKE 1 TABLET BY MOUTH TWICE DAILY CALCIUM 600 WITH VITAMIN D3 600 mg(1,500mg) -400 unit Chew Generic drug:  Calcium-Cholecalciferol (D3) Your last dose was: Your next dose is: Take  by mouth. Chews one po once daily. * carvedilol 6.25 mg tablet Commonly known as:  Sujatha Flowers Your last dose was: Your next dose is: Take 6.25 mg by mouth two (2) times a day. Takes with the 12.5mg.  
 6.25 mg  
    
   
   
   
  
 * carvedilol 25 mg tablet Commonly known as:  Sujatha Keitat Your last dose was: Your next dose is: Take 1 Tab by mouth two (2) times daily (with meals). 25 mg  
    
   
   
   
  
 losartan 100 mg tablet Commonly known as:  COZAAR Your last dose was: Your next dose is: TAKE 1 TABLET BY MOUTH DAILY MULTIVITAMIN PO Your last dose was: Your next dose is: Take  by mouth. Takes one po once daily. OTHER Your last dose was: Your next dose is:    
   
   
 Takes iron po once daily. Will bring in strength. * Notice: This list has 2 medication(s) that are the same as other medications prescribed for you. Read the directions carefully, and ask your doctor or other care provider to review them with you. Discharge Instructions [address] 
[address], [address]  
[phone] DISCHARGE INSTRUCTIONS Evon Carlson [id number] 
1985 DISCOMFORT: 
Redness at IV site- apply warm compress to area; if redness or soreness persist- contact your physician There may be a slight amount of blood passed from the rectum Gaseous discomfort- walking, belching will help relieve any discomfort You may not operate a vehicle for 12 hours You may not  engage in an occupation involving machinery or appliances for rest of today You may not  drink alcoholic beverages for at least 12 hours Avoid making any critical decisions for at least 24 hour DIET: 
 High fiber diet.  however -  remember your colon is empty and a heavy meal will produce gas. Avoid these foods:  vegetables, fried / greasy foods, carbonated drinks for today ACTIVITY You may resume your normal daily activities Spend the remainder of the day resting -  avoid any strenuous activity. CALL M.D. ANY SIGN OF Increasing pain, nausea, vomiting Abdominal distension (swelling) New increased bleeding (oral or rectal) Fever (chills) Pain in chest area Bloody discharge from nose or mouth Shortness of breath You may not  take any Advil, Aspirin, Ibuprofen, Motrin, Aleve, or Goodys for 5 days, ONLY  Tylenol as needed for pain. Post procedure diagnosis:  Normal EGD, hx Gastric Bypass Follow-up Instructions: 
 Call Dr. Gretel Connor for any questions or problems. If we took a biopsy please call the office within 2 weeks to discuss your                             pathology results. Telephone # [phone] Introducing \[facility] [place] [facility]\"" & [facility] SERVICES! Dear Yissel Roger: 
Thank you for requesting a [facility] account. Our records indicate that you already have an active [facility] account. You can access your account anytime at [email or web address] Did you know that you can access your hospital and ER discharge instructions at any time in [facility]? You can also review all of your test results from your hospital stay or ER visit. Additional Information If you have questions, please visit the Frequently Asked Questions section of the [facility] website at [email or web address] Remember, [facility] is NOT to be used for urgent needs. For medical emergencies, dial 911. Now available from your iPhone and Android! Providers Seen During Your Hospitalization Provider Specialty Primary office phone Trista Sequeira MD Gastroenterology [phone] Your Primary Care Physician (PCP) Primary Care Physician Office Phone Office Fax NONE ** None ** ** None ** You are allergic to the following Allergen Reactions Bee Sting (Sting, Bee) Anaphylaxis Lisinopril Cough Recent Documentation Weight BMI OB Status Smoking Status 102.1 kg 41.15 kg/m2 IUD Never Smoker Emergency Contacts Name Discharge Info Relation Home Work Mobile [address] CAREGIVER [3] Mother [14] [phone] Faith Lawrence  Parent [1] [phone] Patient Belongings The following personal items are in your possession at time of discharge: 
     Visual Aid: None Please provide this summary of care documentation to your next provider. Signatures-by signing, you are acknowledging that this After Visit Summary has been reviewed with you and you have received a copy. Patient Signature:  ____________________________________________________________ Date:  ____________________________________________________________  
  
Davian Mealing Provider Signature:  ____________________________________________________________ Date:  ____________________________________________________________

## 2018-03-21 NOTE — IP AVS SNAPSHOT
Summary of Care Report The Summary of Care report has been created to help improve care coordination. Users with access to [employer] or [address] (Web-based application) may access additional patient information including the Discharge Summary. If you are not currently a [address] user and need more information, please call the number listed below in the [place] [address] section and ask to be connected with Medical Records. Facility Information Name Address Phone [address] [place] [id number] [phone] Patient Information Patient Name Sex  Mauri Sargent ([id number]) Female 1985 Discharge Information Admitting Provider Service Area Unit Everette Carmichael MD  [facility] [phone] Discharge Provider Discharge Date/Time Discharge Disposition Destination (none) (none) (none) (none) Patient Language Language ENGLISH [13] Hospital Problems as of 3/21/2018  Reviewed: 3/19/2018  3:01 PM by Pantera Pompa MD  
 None Non-Hospital Problems as of 3/21/2018  Reviewed: 3/19/2018  3:01 PM by Pantera Pompa MD  
  
  
  
 Class Noted - Resolved Last Modified Active Problems Palpitations  2011 - Present 3/2/2018 by Pantera Pompa MD  
  Entered by Nimesh Bonilla MD  
  Syncope and collapse  2011 - Present 2012 by Pantera Pompa MD  
  Entered by Nimesh Bonilla MD  
  Cardiomyopathy, peripartum, postpartum  2011 - Present 2011 by Nimesh Bonilla MD  
  Entered by Nimesh Bonilla MD  
  Heart murmur  2011 - Present 2011 by Nimesh Bonilla MD  
  Entered by Nimesh Bonilla MD  
  Mitral valve disorder  2012 - Present 12/15/2015 by Misty Jasmine RN   Entered by Nimesh Bonilla MD  
 Morbid obesity ([facility] [address]  1/17/2012 - Present 3/2/2018 by Kylie Taylor MD  
  Entered by Dakota Hancock MD  
  Observed sleep apnea  1/17/2012 - Present 1/17/2012 by Dakota Hancock MD  
  Entered by Dakota Hancock MD  
  Cardiomyopathy, dilated, nonischemic [facility])  Unknown - Present 3/2/2018 by Kylie Taylor MD  
  Entered by Kylie Taylor MD  
  Single implantable cardioverter-defibrillator in situ  2/1/2012 - Present 3/2/2018 by Kylie Taylor MD  
  Entered by Kylie Taylor MD  
  Overview Signed 2/1/2012  7:00 AM by Kylie Taylor MD  
   [facility] single lead single coil AICD implant 1/30/2012 BP (high blood pressure)  7/30/2012 - Present 7/30/2012 by Bal Gonsales MD  
  Entered by Bal Gonsales MD  
  Abnormal EKG  11/19/2012 - Present 11/21/2012 Entered by Bal Gonsales MD  
  Mitral regurgitation  11/19/2012 - Present 11/21/2012 Entered by Bal Gonsales MD  
  Headache(784.0)  4/17/2013 - Present 4/19/2013 Entered by Britany Cortez MD  
  Tricuspid inspiratory pansystolic murmur  82/61/2250 - Present 10/17/2013 by Bal Gonsales MD  
  Entered by Bal Gonsales MD  
  Pulmonary hypertension  10/17/2013 - Present 10/17/2013 by Bal Gonsales MD  
  Entered by Bal Gonsales MD  
  PSVT (paroxysmal supraventricular tachycardia) ([facility] [address]  3/2/2018 - Present 3/2/2018 by Kylie Taylor MD  
  Entered by Kylie Taylor MD  
  S/P ablation of ventricular arrhythmia  3/2/2018 - Present 3/2/2018 by Kylie Taylor MD  
  Entered by Kylie Taylor MD  
  
You are allergic to the following Allergen Reactions Bee Sting (Sting, Bee) Anaphylaxis Lisinopril Cough Current Discharge Medication List  
  
CONTINUE these medications which have NOT CHANGED Dose & Instructions Dispensing Information Comments  
 b complex vitamins Liqd Take  by mouth daily. Refills:  0  
   
 bumetanide 2 mg tablet Commonly known as:  Concepción George TAKE 1 TABLET BY MOUTH TWICE DAILY Quantity:  180 Tab Refills:  2  
 **Patient requests 90 days supply** CALCIUM 600 WITH VITAMIN D3 600 mg(1,500mg) -400 unit Chew Generic drug:  Calcium-Cholecalciferol (D3) Take  by mouth. Chews one po once daily. Refills:  0  
   
 * carvedilol 6.25 mg tablet Commonly known as:  Ruthell Basilio Dose:  6.25 mg Take 6.25 mg by mouth two (2) times a day. Takes with the 12.5mg. Refills:  0  
   
 * carvedilol 25 mg tablet Commonly known as:  Ruthell Fellers Dose:  25 mg Take 1 Tab by mouth two (2) times daily (with meals). Quantity:  180 Tab Refills:  2 Also adding an additional 6.25 mg bid  
  
 losartan 100 mg tablet Commonly known as:  COZAAR  
 TAKE 1 TABLET BY MOUTH DAILY Quantity:  90 Tab Refills:  2 MULTIVITAMIN PO Take  by mouth. Takes one po once daily. Refills:  0  
   
 OTHER Takes iron po once daily. Will bring in strength. Refills:  0  
   
 * Notice: This list has 2 medication(s) that are the same as other medications prescribed for you. Read the directions carefully, and ask your doctor or other care provider to review them with you. Current Immunizations Name Date Influenza Vaccine Split 11/1/2011 ZZZ-RETIRED (DO NOT USE) Pneumococcal Vaccine (Unspecified Type) 11/1/2011 Surgery Information ID Date/Time Status Primary Surgeon All Procedures Location [id number] 3/21/2018 1100 Unposted Kesha Campo MD ESOPHAGOGASTRODUODENOSCOPY (EGD) COLONOSCOPY [facility] ENDOSCOPY Follow-up Information None [facility] 
[address] [facility] [address] [place], [address] 
[phone] DISCHARGE INSTRUCTIONS [facility] [id number] 
1985 DISCOMFORT: 
Redness at IV site- apply warm compress to area; if redness or soreness persist- contact your physician There may be a slight amount of blood passed from the rectum Gaseous discomfort- walking, belching will help relieve any discomfort You may not operate a vehicle for 12 hours You may not  engage in an occupation involving machinery or appliances for rest of today You may not  drink alcoholic beverages for at least 12 hours Avoid making any critical decisions for at least 24 hour DIET: 
 High fiber diet.  however -  remember your colon is empty and a heavy meal will produce gas. Avoid these foods:  vegetables, fried / greasy foods, carbonated drinks for today ACTIVITY You may resume your normal daily activities Spend the remainder of the day resting -  avoid any strenuous activity. CALL M.D. ANY SIGN OF Increasing pain, nausea, vomiting Abdominal distension (swelling) New increased bleeding (oral or rectal) Fever (chills) Pain in chest area Bloody discharge from nose or mouth Shortness of breath You may not  take any Advil, Aspirin, Ibuprofen, Motrin, Aleve, or Goodys for 5 days, ONLY  Tylenol as needed for pain. Post procedure diagnosis:  Normal EGD, hx Gastric Bypass Follow-up Instructions: 
 Call Dr. Nilo Santoyo for any questions or problems. If we took a biopsy please call the office within 2 weeks to discuss your                             pathology results. Telephone # [phone] Chart Review Routing History Recipient Method Report Sent By Francis Lay MD  
Phone: [phone] In [employer] Note Review Judith Sood MD [[id number]] 1/9/2012  3:58 PM 01/09/2012 Judith Sood MD  
Phone: [phone] In [employer]ants [facility] amb office visit enc summ w/hx Savanna Lay MD [[id number]] 1/23/2012  9:00 AM 01/19/2012 Anne Garcia MD  
Phone: [phone] In Basket Note Review Judith Sood MD [[id number]] 8/3/2012  5:15 PM 08/03/2012 Marita Jaramillo MD  
Phone: [phone] In [employer] Note Review Judith Sood MD [[id number]] 8/20/2012  2:01 PM 08/20/2012 Marita Jaramillo MD  
Phone: [phone] In Basket Note Review Marivel Gongora MD [[id number]] 12/18/2012  8:42 AM 12/18/2012 Akhil Woodward MD  
Phone: [phone] In Basket [facility] amb office visit enc summ w/hx Marivel Gongora MD [[id number]] 7/16/2013  3:57 PM 07/16/2013 Marivel Gongora MD  
Phone: [phone] In Basket Note Review Akhil Woodward MD [[id number]] 9/30/2013  6:18 PM 09/30/2013 Maria G Calix MD  
Fax: [phone] Phone: [phone] Fax [facility] amb office visit enc summ w/hx Akhil Woodward MD [[id number]] 3/26/2014 11:58 PM 03/26/2014 Marivel Gongora MD  
Phone: [phone] In [facility] amb office visit enc summ w/hx Akhil Woodward MD [[id number]] 3/26/2014 11:58 PM 03/26/2014 Maria G Calix MD  
Fax: [phone] Phone: [phone] Fax [facility] amb office visit enc summ w/hx Marivel Gongora MD [[id number]] 3/27/2014  9:53 AM 03/26/2014 Maria G Calix MD  
Fax: [phone] Phone: [phone] Fax [facility] amb office visit enc summ w/hx Marivel Gongora MD [[id number]] 4/3/2014  9:32 AM 04/02/2014 Akhil Woodward MD  
Phone: [phone] In [facility] amb office visit enc summ w/hx Marivel Gongora MD [[id number]] 4/3/2014  9:32 AM 04/02/2014 Maria G Calix MD  
Fax: [phone] Phone: [phone] [facility] Marivel Gongora MD [[id number]] 11/12/2014  4:09 PM 11/07/2014 Maria G Calix MD  
Fax: [phone] Phone: [phone] Fax [facility] amb office visit enc summ w/hx Marivel Gongora MD [[id number]] 7/16/2015  2:11 PM 07/14/2015 Maria G Calix MD  
Fax: [phone] Phone: [phone] Fax Note Review Akhil Woodward MD [[id number]] 11/22/2015  4:51 PM 11/19/2015 Marivel Gongora MD  
Phone: [phone] In Basket Note Review Akhil Woodward MD [[id number]] 11/22/2015  4:51 PM 11/19/2015 Emanuel Hardin MD  
Fax: [phone] Phone: [phone] Fax Outpatient Marivel Gongora MD [[id number]] 9/14/2016  4:17 PM 9/13/2016 Akhil Woodward MD  
Phone: [phone] In [address] Mary Gibson MD [[id number]] 9/14/2016  4:17 PM 9/13/2016 Shelly Santiago MD  
Fax: [phone] Phone: [phone] Fax Notes Report Lindsey Wilcox MD [[id number]] 9/15/2016 10:20 AM 9/15/2016 Yamileth Canales MD  
Phone: [phone] In Basket Notes Report Lindsey Wilcox MD [[id number]] 9/15/2016 10:20 AM 9/15/2016 lAf Rosa MD  
Fax: [phone] Phone: [phone] Fax Notes Report Lindsey Wilcox MD [[id number]] 6/26/2017  5:47 PM 6/26/2017 Yamileth Canales MD  
Phone: [phone] In Basket Notes Report Lindsey Wilcox MD [[id number]] 6/26/2017  5:47 PM 6/26/2017 Alf Rosa MD  
Fax: [phone] Phone: [phone] Fax Notes Report Lindsey Wilcox MD [[id number]] 2/15/2018  1:49 PM 2/15/2018 Yamileth Canales MD  
Phone: [phone] In Basket Notes Report Lindsey Wilcox MD [[id number]] 2/15/2018  1:49 PM 2/15/2018 Yamileth Canales MD  
Phone: [phone] In Basket Notes Report Lindsey Wilcox MD [[id number]] 3/19/2018  4:55 PM 3/19/2018

## 2018-05-18 ENCOUNTER — TELEPHONE (OUTPATIENT)
Dept: CARDIOLOGY CLINIC | Age: 33
End: 2018-05-18

## 2018-05-18 NOTE — TELEPHONE ENCOUNTER
----- Message from Macy Mnarique RN sent at 5/18/2018  3:02 PM EDT -----  Regarding: FW: Non-Urgent Medical Question  Contact: [phone]      ----- Message -----     From: Hugo Rodgers     Sent: 5/18/2018   3:00 PM       To: Kelsey Stokes  Subject: Non-Urgent Medical Question                      Hello, I am needing to have my vehicle tinted as the direct sunlight effects the device in my chest causing it to get warm and to help keep the vehicle as cool as possible. I am  attaching it to this email. It can be mailed back to me so that I can give it to [facility] as soon as possible. Thank you so much.

## 2018-05-18 NOTE — TELEPHONE ENCOUNTER
Verified patient with two types of identifiers. Spoke to patient and advised her that Dr Caroline Orellana does not fill these forms out for ICD's. She states that when she is driving her car her ICD gets very hot and she had mentioned this to Dr Caroline Orellana before and he told her that he would complete this form for her to have her front windows tinted. Notified her that I would check with Dr Caroline Orellana but that we have no medical reason to advise her to have her front car window tinted.

## 2018-05-21 ENCOUNTER — OFFICE VISIT (OUTPATIENT)
Dept: CARDIOLOGY CLINIC | Age: 33
End: 2018-05-21

## 2018-05-21 DIAGNOSIS — Z95.810 PRESENCE OF AUTOMATIC CARDIOVERTER/DEFIBRILLATOR (AICD): Primary | ICD-10-CM

## 2018-06-29 DIAGNOSIS — I10 ESSENTIAL HYPERTENSION WITH GOAL BLOOD PRESSURE LESS THAN 130/80: ICD-10-CM

## 2018-06-29 DIAGNOSIS — I42.0 CARDIOMYOPATHY, DILATED, NONISCHEMIC (HCC): ICD-10-CM

## 2018-06-29 DIAGNOSIS — I10 ESSENTIAL HYPERTENSION: ICD-10-CM

## 2018-06-29 DIAGNOSIS — R00.2 PALPITATIONS: ICD-10-CM

## 2018-06-29 RX ORDER — LOSARTAN POTASSIUM 100 MG/1
TABLET ORAL
Qty: 90 TAB | Refills: 2 | Status: SHIPPED | OUTPATIENT
Start: 2018-06-29 | End: 2019-07-28 | Stop reason: SDUPTHER

## 2018-06-29 RX ORDER — CARVEDILOL 25 MG/1
25 TABLET ORAL 2 TIMES DAILY WITH MEALS
Qty: 180 TAB | Refills: 2 | Status: SHIPPED | OUTPATIENT
Start: 2018-06-29 | End: 2019-05-20 | Stop reason: SDUPTHER

## 2018-06-29 RX ORDER — BUMETANIDE 2 MG/1
TABLET ORAL
Qty: 180 TAB | Refills: 2 | Status: SHIPPED | OUTPATIENT
Start: 2018-06-29 | End: 2019-06-29 | Stop reason: SDUPTHER

## 2018-06-29 NOTE — TELEPHONE ENCOUNTER
Requested Prescriptions     Signed Prescriptions Disp Refills    losartan (COZAAR) 100 mg tablet 90 Tab 2     Sig: TAKE 1 TABLET BY MOUTH DAILY     Authorizing Provider: Wesley Stacy     Ordering User: Aydee Monterroso    bumetanide (BUMEX) 2 mg tablet 180 Tab 2     Sig: TAKE 1 TABLET BY MOUTH TWICE DAILY     Authorizing Provider: Wesley Stacy     Ordering User: Aydee Monterroso    carvedilol (COREG) 25 mg tablet 180 Tab 2     Sig: Take 1 Tab by mouth two (2) times daily (with meals).      Authorizing Provider: Wesley Stacy     Ordering User: Aydee Monterroso    Per Dr. Elliott Sandoval verbal orders

## 2018-08-22 ENCOUNTER — OFFICE VISIT (OUTPATIENT)
Dept: CARDIOLOGY CLINIC | Age: 33
End: 2018-08-22

## 2018-08-22 DIAGNOSIS — Z95.810 PRESENCE OF AUTOMATIC CARDIOVERTER/DEFIBRILLATOR (AICD): Primary | ICD-10-CM

## 2018-08-31 DIAGNOSIS — R00.2 PALPITATIONS: ICD-10-CM

## 2018-08-31 DIAGNOSIS — I10 ESSENTIAL HYPERTENSION WITH GOAL BLOOD PRESSURE LESS THAN 130/80: ICD-10-CM

## 2018-08-31 DIAGNOSIS — I42.0 CARDIOMYOPATHY, DILATED, NONISCHEMIC (HCC): ICD-10-CM

## 2018-08-31 DIAGNOSIS — I10 ESSENTIAL HYPERTENSION: ICD-10-CM

## 2018-08-31 RX ORDER — BUMETANIDE 2 MG/1
TABLET ORAL
Qty: 180 TAB | Refills: 0 | Status: SHIPPED | OUTPATIENT
Start: 2018-08-31 | End: 2019-01-30 | Stop reason: SDUPTHER

## 2018-08-31 RX ORDER — LOSARTAN POTASSIUM 100 MG/1
TABLET ORAL
Qty: 90 TAB | Refills: 0 | Status: SHIPPED | OUTPATIENT
Start: 2018-08-31 | End: 2019-01-30 | Stop reason: SDUPTHER

## 2018-08-31 RX ORDER — CARVEDILOL 6.25 MG/1
TABLET ORAL
Qty: 60 TAB | Refills: 0 | Status: SHIPPED | OUTPATIENT
Start: 2018-08-31 | End: 2019-01-30 | Stop reason: SDUPTHER

## 2018-08-31 RX ORDER — CARVEDILOL 25 MG/1
TABLET ORAL
Qty: 180 TAB | Refills: 0 | Status: SHIPPED | OUTPATIENT
Start: 2018-08-31 | End: 2019-01-30 | Stop reason: SDUPTHER

## 2018-11-28 ENCOUNTER — OFFICE VISIT (OUTPATIENT)
Dept: CARDIOLOGY CLINIC | Age: 33
End: 2018-11-28

## 2018-11-28 DIAGNOSIS — Z95.810 PRESENCE OF AUTOMATIC CARDIOVERTER/DEFIBRILLATOR (AICD): Primary | ICD-10-CM

## 2018-12-14 ENCOUNTER — PATIENT MESSAGE (OUTPATIENT)
Dept: CARDIOLOGY CLINIC | Age: 33
End: 2018-12-14

## 2018-12-14 NOTE — TELEPHONE ENCOUNTER
Called patient. Verified patient's identity with two identifiers. Notified her the Dr. Mariola Ventura is advising patients to contact their pharmacy regarding any medication recalls. I explained that the recalls are certain manufacturers and Dr. Mariola Ventura does not want to change medications that are working if not necessary. Patient verbalized understanding and denied further questions or concerns.

## 2018-12-14 NOTE — TELEPHONE ENCOUNTER
Regarding: FW: Prescription Question  Contact: [phone]      ----- Message -----  From: Mohinder Cast  Sent: 12/14/2018   1:56 PM  To: Roge Fernandez Nurse Pool  Subject: Prescription Question                            ----- Message from [address] [place] [address] 951, Generic sent at 12/14/2018  1:56 PM EST -----    Good Afternoon, I have see on the news that Losartan was recalled for causing cancer. Is there a new medication that I need to come in to be switched to? I am concerned because I have had it refilled twice and the pills have been drastically different. One was a large oval and the one I just received was small and round with an S on it. They both are white in color. A phone would be better but an email will suffice. Thank you.

## 2019-01-30 ENCOUNTER — OFFICE VISIT (OUTPATIENT)
Dept: CARDIOLOGY CLINIC | Age: 34
End: 2019-01-30

## 2019-01-30 VITALS
WEIGHT: 233.6 LBS | DIASTOLIC BLOOD PRESSURE: 86 MMHG | BODY MASS INDEX: 42.99 KG/M2 | HEIGHT: 62 IN | HEART RATE: 72 BPM | RESPIRATION RATE: 18 BRPM | SYSTOLIC BLOOD PRESSURE: 124 MMHG

## 2019-01-30 DIAGNOSIS — R00.2 PALPITATIONS: ICD-10-CM

## 2019-01-30 DIAGNOSIS — I10 ESSENTIAL HYPERTENSION WITH GOAL BLOOD PRESSURE LESS THAN 130/80: ICD-10-CM

## 2019-01-30 DIAGNOSIS — Z95.810 PRESENCE OF AUTOMATIC CARDIOVERTER/DEFIBRILLATOR (AICD): ICD-10-CM

## 2019-01-30 DIAGNOSIS — I07.9: ICD-10-CM

## 2019-01-30 DIAGNOSIS — I34.0 MITRAL VALVE INSUFFICIENCY, UNSPECIFIED ETIOLOGY: ICD-10-CM

## 2019-01-30 DIAGNOSIS — I47.1 PSVT (PAROXYSMAL SUPRAVENTRICULAR TACHYCARDIA) (HCC): ICD-10-CM

## 2019-01-30 DIAGNOSIS — E66.01 OBESITY, CLASS III, BMI 40-49.9 (MORBID OBESITY) (HCC): ICD-10-CM

## 2019-01-30 NOTE — PROGRESS NOTES
Chief Complaint Patient presents with  Follow-up 1. Have you been to the ER, urgent care clinic since your last visit? Hospitalized since your last visit? No 
 
2. Have you seen or consulted any other health care providers outside of the [address] [place] [address] since your last visit? Include any pap smears or colon screening. No 
 
3) Do you have an Advance Directive on file? no 
 
Patient is accompanied by self I have received verbal consent from Candido Montemayor to discuss any/all medical information while they are present in the room.

## 2019-01-30 NOTE — PROGRESS NOTES
HISTORY OF PRESENT ILLNESS Merry Liu is a 28 y.o. female. She is seen after an absence of a year. 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 She also has had supraventricular tachycardia. Dr. Rosa Maria Vieira attempted to isolate this rhythm but could not find it. Her EF when last assessed by echo a year ago was in the range of 30%. In the past she had a very loud murmur of tricuspid insufficiency but this was not noted on the last exam. She has been working 12 hour days and finds this hard to do. She would like a paper signed saying that her workday should be limited to 8 hours. We have done this in the past and will do it again today. She has had gastric bypass surgery which caused a 100 pound weight loss, but she has gained about 30-35 pounds. She is able to work out at [employer]. She is not sleeping well due to 12 hour days. She still is considering having another baby and asked me about this again today. HPI Patient Active Problem List  
Diagnosis Code  Palpitations R00.2  Syncope and collapse R55  Cardiomyopathy, peripartum, postpartum O90.3  Heart murmur R01.1  Mitral valve disorder I05.9  Morbid obesity ([facility]) E66.01  
 Observed sleep apnea G47.30  Cardiomyopathy, dilated, nonischemic ([facility]) I42.0  Single implantable cardioverter-defibrillator in situ Z95.810  
 BP (high blood pressure) I10  
 Abnormal EKG R94.31  
 Mitral regurgitation I34.0  Headache(784.0) R51  Tricuspid inspiratory pansystolic murmur E59.6  Pulmonary hypertension ([facility]) I27.20  PSVT (paroxysmal supraventricular tachycardia) ([facility]) I47.1  S/P ablation of ventricular arrhythmia Z98.890, Z86.79 Current Outpatient Medications Medication Sig Dispense Refill  losartan (COZAAR) 100 mg tablet TAKE 1 TABLET BY MOUTH DAILY 90 Tab 2  
 bumetanide (BUMEX) 2 mg tablet TAKE 1 TABLET BY MOUTH TWICE DAILY 180 Tab 2  
  carvedilol (COREG) 25 mg tablet Take 1 Tab by mouth two (2) times daily (with meals). 180 Tab 2  MULTIVITAMIN PO Take  by mouth. Takes one po once daily.  OTHER Takes iron po once daily. Will bring in strength.  b complex vitamins Liqd Take  by mouth daily.  carvedilol (COREG) 6.25 mg tablet Take 6.25 mg by mouth two (2) times a day. Takes with the 12.5mg.    
 Calcium-Cholecalciferol, D3, (CALCIUM 600 WITH VITAMIN D3) 600 mg(1,500mg) -400 unit Chew Take  by mouth. Chews one po once daily. Past Medical History:  
Diagnosis Date  Arrhythmia BRADYCARDIA  Cardiomyopathy, dilated, nonischemic (HCC)  Congestive heart failure, unspecified  Essential hypertension  Heart failure ([address]  Hypertension  ICD (implantable cardiac defibrillator) in place [facility] dual coil, single lead  Morbid obesity ([facility]  Murmur  Syncope  Syncope and collapse 12/22/2011  TIA (transient ischemic attack)  Valvular heart disease Past Surgical History:  
Procedure Laterality Date  COLONOSCOPY N/A 3/21/2018 COLONOSCOPY performed by Manny Kaiser MD at [address]  2008  HX CHOLECYSTECTOMY  2010  HX GASTRIC BYPASS  HX PACEMAKER    
 DEFIBRILLATOR  
 INS PPM/ICD LED SING ONLY  1/30/2012  MT COMPRE ELECTROPHYSIOL XM W/LEFT ATRIAL PACNG/REC  3/2/2018  MT COMPRE ELECTROPHYSIOLOGIC ARRHYTHMIA INDUCTION  3/2/2018  STIM/PACING HEART POST IV DRUG INFU  3/2/2018  BELLO DOPPLER  2/25/2013 Review of Systems Cardiovascular: Positive for palpitations. All other systems reviewed and are negative. Visit Vitals /86 (BP 1 Location: Right arm, BP Patient Position: Sitting) Pulse 72 Resp 18 Ht 5' 2\" (1.575 m) Wt 233 lb 9.6 oz (106 kg) BMI 42.73 kg/m² Physical Exam  
Constitutional: She is oriented to person, place, and time. She appears well-nourished.   
HENT:  
 Head: Atraumatic. Eyes: Conjunctivae are normal.  
Neck: Neck supple. Cardiovascular: Normal rate, regular rhythm and normal heart sounds. Exam reveals no gallop and no friction rub. No murmur heard. Pulmonary/Chest: Breath sounds normal. She has no wheezes. She has no rales. Abdominal: Bowel sounds are normal.  
Musculoskeletal: She exhibits edema. Neurological: She is oriented to person, place, and time. Skin: Skin is dry. Psychiatric: Her behavior is normal.  
Nursing note and vitals reviewed. ASSESSMENT and PLAN She has mild edema to exam which is chronic. Once again, her murmur is no longer noted. I spent considerable time talking to her today about the risk of having another pregnancy. I do believe that one must assume her cardiomyopathy was due to the original pregnancy and would stand to recur should she become pregnant again. Also, she may have trouble carrying to term. She states she still would like to consider this possibly next year once she has been able to lose more weight. I will fill out her form again today to say that she should not work more than 8 hours a day. I will order an echocardiogram and this can be done when she sees Dr. Altagracia Yu and we will call her regarding the results. Otherwise, I will see her back in six months.

## 2019-01-30 NOTE — LETTER
1/30/2019 9:52 AM 
 
Ms. Juliane Harrell [address] [facility] [address] To Whom It May Concern: 
 
Juliane Harrell is currently under the care of [address] Ms. Carlos Zapien was seen by me in the office today. If there are questions or concerns please have the patient contact our office.  
 
 
 
Sincerely, 
 
 
 
 
Virgilio Lr MD

## 2019-02-11 ENCOUNTER — TELEPHONE (OUTPATIENT)
Dept: CARDIOLOGY CLINIC | Age: 34
End: 2019-02-11

## 2019-02-11 NOTE — TELEPHONE ENCOUNTER
Patient is requesting a call back to discuss her FMLA paper work. She states that there are two pieces of information that are still unclear and she would like to know if that can be updated and re sent. She states she sent you an e-mail through [employer] and asks if you can just respond to that.      Phone:

## 2019-03-12 ENCOUNTER — OFFICE VISIT (OUTPATIENT)
Dept: CARDIOLOGY CLINIC | Age: 34
End: 2019-03-12

## 2019-03-12 ENCOUNTER — CLINICAL SUPPORT (OUTPATIENT)
Dept: CARDIOLOGY CLINIC | Age: 34
End: 2019-03-12

## 2019-03-12 VITALS
WEIGHT: 236 LBS | RESPIRATION RATE: 14 BRPM | BODY MASS INDEX: 43.43 KG/M2 | HEART RATE: 85 BPM | DIASTOLIC BLOOD PRESSURE: 86 MMHG | HEIGHT: 62 IN | SYSTOLIC BLOOD PRESSURE: 138 MMHG

## 2019-03-12 DIAGNOSIS — Z95.810 CARDIAC DEFIBRILLATOR IN SITU: Primary | ICD-10-CM

## 2019-03-12 DIAGNOSIS — I47.1 PSVT (PAROXYSMAL SUPRAVENTRICULAR TACHYCARDIA) (HCC): ICD-10-CM

## 2019-03-12 DIAGNOSIS — R00.2 PALPITATIONS: ICD-10-CM

## 2019-03-12 NOTE — PROGRESS NOTES
Subjective:      Wilfrid Mattson is a 28 y.o. female who is seen for follow up, is s/p [facility] single lead ICD (DOI 01/30/2012) for postpartum dilated cardiomyopathy. Device check today shows proper lead & generator function. Approx 3.5 years generator longevity noted. Since 11/28/2018, 32 episodes of SVT (rates 150-157 bpm, lasting 17-70 seconds). States she has been doing well. Occasionally notes mild palpitations when she's working out (running, [employer] training), but doesn't really bother her. NYHA I-II symptoms. Echo today. Preliminary report shows LVEF 41% & severely dilated LA with mild MR, TR, & ND. Previous:  EP study 03/2018; there was no sustained SVT to map, and therefore no ablation was performed. PAC and nonsustained right sided atrial flutter which was not her clinical SVT seen on ICD check. Post-procedure, she had slight bleeding from the RFV site when she first got out of bed, but was easily controlled with manual compression & did not recur. Echo (01/12/2018): LVEF 25-35%, no RWMA, mod diffuse hypokinesis. RV mildly dilated. LA mod dilated. RA mildly dilated. Mild to mod MR. Mild TR. Mild to mod ND. ICD placed due to syncope with cardiomyopathy. Diagnosed with postpartum cardiomyopathy in 2008. Followed by Dr. Ilya Malik. She had ICD lead fractured (she was not compliant and followed up regularly at the time) and lead was extracted and reimplanted by me.     Problem List  Date Reviewed: 1/30/2019          Codes Class Noted    S/P ablation of ventricular arrhythmia ICD-10-CM: Z98.890, Z86.79  ICD-9-CM: V45.89  3/2/2018        PSVT (paroxysmal supraventricular tachycardia) (HCC) ICD-10-CM: I47.1  ICD-9-CM: 427.0  3/2/2018        Tricuspid inspiratory pansystolic murmur SED-44-UC: I07.9  ICD-9-CM: 785.2  10/17/2013        Pulmonary hypertension ([address] ICD-10-CM: I27.20  ICD-9-CM: 416.8  10/17/2013        Headache(784.0) ICD-10-CM: R51  ICD-9-CM: 784.0 4/17/2013        Abnormal EKG ICD-10-CM: R94.31  ICD-9-CM: 794.31  11/19/2012        Mitral regurgitation ICD-10-CM: I34.0  ICD-9-CM: 424.0  11/19/2012        BP (high blood pressure) ICD-10-CM: I10  ICD-9-CM: 401.9  7/30/2012        Single implantable cardioverter-defibrillator in situ ICD-10-CM: Z95.810  ICD-9-CM: V45.02  2/1/2012    Overview Signed 2/1/2012  7:00 AM by Desiree Neil MD     [facility] John single lead single coil AICD implant 1/30/2012             Cardiomyopathy, dilated, nonischemic (HCC) ICD-10-CM: I42.0  ICD-9-CM: 425.4  Unknown        Morbid obesity ([address] ICD-10-CM: E66.01  ICD-9-CM: 278.01  1/17/2012        Observed sleep apnea ICD-10-CM: G47.30  ICD-9-CM: 780.57  1/17/2012        Mitral valve disorder ICD-10-CM: I05.9  ICD-9-CM: 394.9  1/6/2012        Palpitations ICD-10-CM: R00.2  ICD-9-CM: 785.1  12/22/2011        Syncope and collapse ICD-10-CM: R55  ICD-9-CM: 780.2  12/22/2011        Cardiomyopathy, peripartum, postpartum ICD-10-CM: O90.3  ICD-9-CM: 674.54  12/22/2011        Heart murmur ICD-10-CM: R01.1  ICD-9-CM: 785.2  12/22/2011                Current Outpatient Medications   Medication Sig Dispense Refill    losartan (COZAAR) 100 mg tablet TAKE 1 TABLET BY MOUTH DAILY 90 Tab 2    bumetanide (BUMEX) 2 mg tablet TAKE 1 TABLET BY MOUTH TWICE DAILY 180 Tab 2    carvedilol (COREG) 25 mg tablet Take 1 Tab by mouth two (2) times daily (with meals). 180 Tab 2    MULTIVITAMIN PO Take  by mouth. Takes one po once daily.  OTHER Takes iron po once daily. Will bring in strength.  b complex vitamins Liqd Take  by mouth daily.  carvedilol (COREG) 6.25 mg tablet Take 6.25 mg by mouth two (2) times a day. Takes with the 12.5mg.      Calcium-Cholecalciferol, D3, (CALCIUM 600 WITH VITAMIN D3) 600 mg(1,500mg) -400 unit chew Take  by mouth. Chews one po once daily.        Allergies   Allergen Reactions    Bee Sting [Sting, Bee] Anaphylaxis    Lisinopril Cough     Past Medical History: Diagnosis Date    Arrhythmia     BRADYCARDIA    Cardiomyopathy, dilated, nonischemic (HCC)     Congestive heart failure, unspecified     Essential hypertension     Heart failure ([facility]     Hypertension     ICD (implantable cardiac defibrillator) in place     [facility] dual coil, single lead    Morbid obesity ([facility]     Murmur     Syncope     Syncope and collapse 2011    TIA (transient ischemic attack)     Valvular heart disease      Past Surgical History:   Procedure Laterality Date    COLONOSCOPY N/A 3/21/2018    COL[facility] Br[facility]na Keller MD at [facility] ENDOSCOPY    HX  SECTION      HX CHOLECYSTECTOMY      HX GASTRIC BYPASS      HX PACEMAKER      DEFIBRILLATOR    INS PPM/ICD LED SING ONLY  2012         MS COMPRE ELECTROPHYSIOL XM W/LEFT ATRIAL PACNG/REC  3/2/2018         MS COMPRE ELECTROPHYSIOLOGIC ARRHYTHMIA INDUCTION  3/2/2018         STIM/PACING HEART POST IV DRUG INFU  3/2/2018         BELLO DOPPLER  2013          No family history of sudden death. Social History     Tobacco Use    Smoking status: Never Smoker    Smokeless tobacco: Never Used   Substance Use Topics    Alcohol use: No     Alcohol/week: 0.0 oz     Comment: socially      Review of Systems  Constitutional: Negative for fever, chills, unintentional weight loss, or malaise/fatigue. HEENT: Negative for nosebleeds, acute vision changes. Respiratory: Negative for cough, hemoptysis, sputum production, and wheezing. Cardiovascular: Negative for orthopnea, claudication, PND. + rare palpitations. Gastrointestinal: Negative for nausea, vomiting, blood in stool and melena. Genitourinary: Negative for dysuria, hematuria. Musculoskeletal: Negative for myalgias. Skin: Negative for rash and itching. Heme: Does not bleed or bruise easily.    Neurological: Negative for speech change and focal weakness      Objective:     Visit Vitals  /86 (BP 1 Location: Left arm, BP Patient Position: Sitting)   Pulse 85   Resp 14   Ht 5' 2\" (1.575 m)   Wt 236 lb (107 kg)   BMI 43.16 kg/m²      Physical Exam:   General:  alert, cooperative, no distress, appears stated age    Neck: carotids upstroke normal bilaterally, no bruits, no JVD   Chest Wall: respiratory effort normal   Lung: clear to auscultation bilaterally   Heart:  normal rate and regular rhythm, + systolic murmur   Abdomen: Soft, moderate obesity   Extremities: No leg edema             Skin: ICD site on the left shows keloid scar, no redness. ECG (03/19/2018): sinus bradycardia and nonspecific T wave changes    Assessment/Plan:       ICD-10-CM ICD-9-CM    1. Cardiac defibrillator in situ Z95.810 V45.02    2. Cardiomyopathy, peripartum, postpartum O90.3 674.54    3. Palpitations R00.2 785.1    4. PSVT (paroxysmal supraventricular tachycardia) ([facility]) I47.1 427.0      Ms. Sanderson is doing well, has good activity tolerance, is able to exercise vigorously. Preliminary echo report from today shows improved LVEF, now 40%. Device check shows proper lead & generator function, approx 3.5 years generator longevity. Multiple episodes SVT up to 70 seconds noted, not a new finding. Continue GDMT as previously prescribed, managed by Dr. Darien Delgado. States she is very interested in having another child. Discussed risks involved given cardiomyopathy. States she plans to get a 2nd opinion regarding risks; recommended Dr. Kamryn Ferrara at [facility]. Remote ICD checks q 3 months. EP clinic follow up in 1 year. Follow up with Dr. Darien Delgado prn.     Future Appointments   Date Time Provider Shanae Vargas   7/22/2019  9:00 AM Erickson GARRETT   10/9/2019  9:30 AM REMOTE1, [address]   1/15/2020  8:00 AM REMOTE1, [address]   3/31/2020 11:15 AM PACEMAKER3, [address]   3/31/2020 11:40 AM MD Tamara Garcia M.D. [facility]  Electrophysiology/Cardiology  [address] [facility] [place]  [address] , [place] [address] [place] [address]  [phone]

## 2019-03-12 NOTE — LETTER
NOTIFICATION OF RETURN TO WORK   
 
3/12/2019 4:01 PM 
 
Ms. Shana Goldberg [address] [facility] [address] [facility] [place] To Whom It May Concern: 
 
Shana Goldberg was under the care of [address] She will be able to return to work She was seen here in office today If there are questions or concerns please have the patient contact our office.  
 
Sincerely, 
 
 
Joseline Hudson MD

## 2019-03-13 ENCOUNTER — TELEPHONE (OUTPATIENT)
Dept: CARDIOLOGY CLINIC | Age: 34
End: 2019-03-13

## 2019-03-13 NOTE — PROGRESS NOTES
Subjective:   Cardiac Electrophysiology Office Note    Elias Palmer is a 28 y.o. female who is seen for follow up, is s/p [facility]. [facility] single lead ICD (DOI 01/30/2012) for postpartum dilated cardiomyopathy. Device check today shows proper lead & generator function. Approx 3.5 years generator longevity noted. Since 11/28/2018, 32 episodes of SVT (rates 150-157 bpm, lasting 17-70 seconds). States she has been doing well. Occasionally notes mild palpitations when she's working out (running, [employer] training), but doesn't really bother her. NYHA I-II symptoms. Echo today. Preliminary report shows LVEF 41% & severely dilated LA with mild MR, TR, & WI. Previous:  EP study 03/2018; there was no sustained SVT to map, and therefore no ablation was performed. PAC and nonsustained right sided atrial flutter which was not her clinical SVT seen on ICD check. Post-procedure, she had slight bleeding from the RFV site when she first got out of bed, but was easily controlled with manual compression & did not recur. Echo (01/12/2018): LVEF 25-35%, no RWMA, mod diffuse hypokinesis. RV mildly dilated. LA mod dilated. RA mildly dilated. Mild to mod MR. Mild TR. Mild to mod WI. ICD placed due to syncope with cardiomyopathy. Diagnosed with postpartum cardiomyopathy in 2008. Followed by Dr. Didier Benitez. She had ICD lead fractured (she was not compliant and followed up regularly at the time) and lead was extracted and reimplanted by me.     Problem List  Date Reviewed: 3/13/2019          Codes Class Noted    S/P ablation of ventricular arrhythmia ICD-10-CM: Z98.890, Z86.79  ICD-9-CM: V45.89  3/2/2018        PSVT (paroxysmal supraventricular tachycardia) (HCC) ICD-10-CM: I47.1  ICD-9-CM: 427.0  3/2/2018        Tricuspid inspiratory pansystolic murmur VESNA-23-YQ: I07.9  ICD-9-CM: 785.2  10/17/2013        Pulmonary hypertension ([address] ICD-10-CM: I27.20  ICD-9-CM: 416.8  10/17/2013        Headache(784.0) ICD-10-CM: R51  ICD-9-CM: 784.0  4/17/2013        Abnormal EKG ICD-10-CM: R94.31  ICD-9-CM: 794.31  11/19/2012        Mitral regurgitation ICD-10-CM: I34.0  ICD-9-CM: 424.0  11/19/2012        BP (high blood pressure) ICD-10-CM: I10  ICD-9-CM: 401.9  7/30/2012        Single implantable cardioverter-defibrillator in situ ICD-10-CM: Z95.810  ICD-9-CM: V45.02  2/1/2012    Overview Signed 2/1/2012  7:00 AM by Joss Deutsch MD     [facility] single lead single coil AICD implant 1/30/2012             Cardiomyopathy, dilated, nonischemic (HCC) ICD-10-CM: I42.0  ICD-9-CM: 425.4  Unknown        Morbid obesity ([facility] ICD-10-CM: E66.01  ICD-9-CM: 278.01  1/17/2012        Observed sleep apnea ICD-10-CM: G47.30  ICD-9-CM: 780.57  1/17/2012        Mitral valve disorder ICD-10-CM: I05.9  ICD-9-CM: 394.9  1/6/2012        Palpitations ICD-10-CM: R00.2  ICD-9-CM: 785.1  12/22/2011        Syncope and collapse ICD-10-CM: R55  ICD-9-CM: 780.2  12/22/2011        Cardiomyopathy, peripartum, postpartum ICD-10-CM: O90.3  ICD-9-CM: 674.54  12/22/2011        Heart murmur ICD-10-CM: R01.1  ICD-9-CM: 785.2  12/22/2011                Current Outpatient Medications   Medication Sig Dispense Refill    losartan (COZAAR) 100 mg tablet TAKE 1 TABLET BY MOUTH DAILY 90 Tab 2    bumetanide (BUMEX) 2 mg tablet TAKE 1 TABLET BY MOUTH TWICE DAILY 180 Tab 2    carvedilol (COREG) 25 mg tablet Take 1 Tab by mouth two (2) times daily (with meals). 180 Tab 2    MULTIVITAMIN PO Take  by mouth. Takes one po once daily.  OTHER Takes iron po once daily. Will bring in strength.  b complex vitamins Liqd Take  by mouth daily.  carvedilol (COREG) 6.25 mg tablet Take 6.25 mg by mouth two (2) times a day. Takes with the 12.5mg.      Calcium-Cholecalciferol, D3, (CALCIUM 600 WITH VITAMIN D3) 600 mg(1,500mg) -400 unit chew Take  by mouth. Chews one po once daily.        Allergies   Allergen Reactions    Bee Sting [Sting, Bee] Anaphylaxis    Lisinopril Cough Past Medical History:   Diagnosis Date    Arrhythmia     BRADYCARDIA    Cardiomyopathy, dilated, nonischemic (HCC)     Congestive heart failure, unspecified     Essential hypertension     [facility] failure ([facility]     Hypertension     ICD (implantable cardiac defibrillator) in place     [facility] dual coil, single lead    Morbid obesity ([address]     Murmur     Syncope     Syncope and collapse 2011    TIA (transient ischemic attack)     Valvular heart disease      Past Surgical History:   Procedure Laterality Date    COLONOSCOPY N/A 3/21/2018    [facility]idel [facility] ENDOSCOPY    HX  SECTION      HX CHOLECYSTECTOMY      HX GASTRIC BYPASS      HX PACEMAKER      DEFIBRILLATOR    INS PPM/ICD LED SING ONLY  2012         [place] COMPRE ELECTROPHYSIOL XM W/LEFT ATRIAL PACNG/REC  3/2/2018         [place] COMPRE ELECTROPHYSIOLOGIC ARRHYTHMIA INDUCTION  3/2/2018         STIM/PACING HEART POST IV DRUG INFU  3/2/2018         BELLO DOPPLER  2013          No family history of sudden death. Social History     Tobacco Use    Smoking status: Never Smoker    Smokeless tobacco: Never Used   Substance Use Topics    Alcohol use: No     Alcohol/week: 0.0 oz     Comment: socially      Review of Systems  Constitutional: Negative for fever, chills, unintentional weight loss, or malaise/fatigue. HEENT: Negative for nosebleeds, acute vision changes. Respiratory: Negative for cough, hemoptysis, sputum production, and wheezing. Cardiovascular: Negative for orthopnea, claudication, PND. + rare palpitations. Gastrointestinal: Negative for nausea, vomiting, blood in stool and melena. Genitourinary: Negative for dysuria, hematuria. Musculoskeletal: Negative for myalgias. Skin: Negative for rash and itching. Heme: Does not bleed or bruise easily.    Neurological: Negative for speech change and focal weakness      Objective:     Visit Vitals  /86 (BP 1 Location: Left arm, BP Patient Position: Sitting)   Pulse 85   Resp 14   Ht 5' 2\" (1.575 m)   Wt 236 lb (107 kg)   BMI 43.16 kg/m²      Physical Exam:   General:  alert, cooperative, no distress, appears stated age    Neck: carotids upstroke normal bilaterally, no bruits, no JVD   Chest Wall: respiratory effort normal   Lung: clear to auscultation bilaterally   Heart:  normal rate and regular rhythm, + 2/6 systolic murmur   Abdomen: Soft, moderate obesity   Extremities: No leg edema             Skin: ICD site on the left shows keloid scar, no redness. ECG (03/19/2018): sinus bradycardia and nonspecific T wave changes    Assessment/Plan:       ICD-10-CM ICD-9-CM    1. Cardiac defibrillator in situ Z95.810 V45.02    2. Cardiomyopathy, peripartum, postpartum O90.3 674.54    3. Palpitations R00.2 785.1    4. PSVT (paroxysmal supraventricular tachycardia) ([facility]) I47.1 427.0      Ms. Sanderson is doing well, has good activity tolerance, is able to exercise vigorously. Preliminary echo report from today shows improved LVEF, now 40%. Final echocardiogram result will be done by Dr. Raymond Orozco. She said Dr Raymond Orozco may refer her to [facility] as needed if she wants to be pregnant again. Her other son is 8year old. Device check shows proper lead & generator function, approx 3.5 years generator longevity. Multiple episodes SVT up to 70 seconds noted, not a new finding. Continue GDMT as previously prescribed, managed by Dr. Raymond Orozco. Remote ICD checks q 3 months. EP clinic follow up in 1 year. Follow up with Dr. Raymond Orozco prn. Addendum:    03/12/19   ECHO ADULT COMPLETE 03/13/2019 3/13/2019    Narrative · At the beginning of the study the resting heart rate was 58bpm with   isolated PVC. eft ventricular low normal systolic function. Calculated   left ventricular ejection fraction is 41%. Biplane method used to measure   ejection fraction. Left ventricular decreased wall thickness.  Left   ventricular global hypokinesis. Abnormal left ventricular strain. Mild   (grade 1) left ventricular diastolic dysfunction. · Left atrial cavity size is severely dilated. · Pacing wire present  · Mild pulmonic valve regurgitation is present. · Mild mitral valve regurgitation. · Mild to moderate tricuspid valve regurgitation is present. Signed by: Sunil Baker MD           Future Appointments   Date Time Provider Shanae Vargas   7/22/2019  9:00 AM Connierussdanya Hope OSORIO TAMI   10/9/2019  9:30 AM REMOTE1, [address]   1/15/2020  8:00 AM REMOTE1, [address]   3/31/2020 11:15 AM PACEMAKER3, [address]   3/31/2020 11:40 AM MD Mitesh Galvez M.D.  [facility]  Electrophysiology/Cardiology  [address] [place] [address] and [facility] [place]  [address] [place] [address]  [phone]

## 2019-03-13 NOTE — TELEPHONE ENCOUNTER
Called patient. Left message on voicemail. Will notify patient of results. Also sent a [employer] message.

## 2019-03-13 NOTE — TELEPHONE ENCOUNTER
----- Message from Kiesha Waite MD sent at 3/13/2019  4:35 PM EDT -----  Echo slightly better than last time EF 35-40%

## 2019-05-20 DIAGNOSIS — I10 ESSENTIAL HYPERTENSION: ICD-10-CM

## 2019-05-20 DIAGNOSIS — I10 ESSENTIAL HYPERTENSION WITH GOAL BLOOD PRESSURE LESS THAN 130/80: ICD-10-CM

## 2019-05-20 DIAGNOSIS — R00.2 PALPITATIONS: ICD-10-CM

## 2019-05-20 DIAGNOSIS — I42.0 CARDIOMYOPATHY, DILATED, NONISCHEMIC (HCC): ICD-10-CM

## 2019-05-20 RX ORDER — CARVEDILOL 25 MG/1
25 TABLET ORAL 2 TIMES DAILY WITH MEALS
Qty: 180 TAB | Refills: 2 | Status: SHIPPED | OUTPATIENT
Start: 2019-05-20 | End: 2020-04-23

## 2019-05-20 NOTE — TELEPHONE ENCOUNTER
Requested Prescriptions     Signed Prescriptions Disp Refills    carvedilol (COREG) 25 mg tablet 180 Tab 2     Sig: Take 1 Tab by mouth two (2) times daily (with meals).      Authorizing Provider: Beryl Ann     Ordering User: Dre Villatoro    Per Dr. Marty Slaughter verbal orders

## 2019-06-29 DIAGNOSIS — R00.2 PALPITATIONS: ICD-10-CM

## 2019-06-29 DIAGNOSIS — I10 ESSENTIAL HYPERTENSION: ICD-10-CM

## 2019-06-29 DIAGNOSIS — I10 ESSENTIAL HYPERTENSION WITH GOAL BLOOD PRESSURE LESS THAN 130/80: ICD-10-CM

## 2019-06-29 DIAGNOSIS — I42.0 CARDIOMYOPATHY, DILATED, NONISCHEMIC (HCC): ICD-10-CM

## 2019-07-01 RX ORDER — BUMETANIDE 2 MG/1
TABLET ORAL
Qty: 180 TAB | Refills: 0 | Status: SHIPPED | OUTPATIENT
Start: 2019-07-01 | End: 2020-10-23 | Stop reason: SDUPTHER

## 2019-07-24 ENCOUNTER — OFFICE VISIT (OUTPATIENT)
Dept: CARDIOLOGY CLINIC | Age: 34
End: 2019-07-24

## 2019-07-24 DIAGNOSIS — Z95.810 AUTOMATIC IMPLANTABLE CARDIAC DEFIBRILLATOR IN SITU: Primary | ICD-10-CM

## 2019-07-28 DIAGNOSIS — I10 ESSENTIAL HYPERTENSION WITH GOAL BLOOD PRESSURE LESS THAN 130/80: ICD-10-CM

## 2019-07-28 DIAGNOSIS — I10 ESSENTIAL HYPERTENSION: ICD-10-CM

## 2019-07-28 DIAGNOSIS — I42.0 CARDIOMYOPATHY, DILATED, NONISCHEMIC (HCC): ICD-10-CM

## 2019-07-28 DIAGNOSIS — R00.2 PALPITATIONS: ICD-10-CM

## 2019-07-29 RX ORDER — LOSARTAN POTASSIUM 100 MG/1
TABLET ORAL
Qty: 90 TAB | Refills: 0 | Status: SHIPPED | OUTPATIENT
Start: 2019-07-29 | End: 2019-12-01 | Stop reason: SDUPTHER

## 2019-08-03 DIAGNOSIS — R00.2 PALPITATIONS: ICD-10-CM

## 2019-08-03 DIAGNOSIS — I10 ESSENTIAL HYPERTENSION: ICD-10-CM

## 2019-08-03 DIAGNOSIS — I10 ESSENTIAL HYPERTENSION WITH GOAL BLOOD PRESSURE LESS THAN 130/80: ICD-10-CM

## 2019-08-03 DIAGNOSIS — I42.0 CARDIOMYOPATHY, DILATED, NONISCHEMIC (HCC): ICD-10-CM

## 2019-08-05 RX ORDER — CARVEDILOL 6.25 MG/1
6.25 TABLET ORAL 2 TIMES DAILY WITH MEALS
Qty: 60 TAB | Refills: 0 | Status: SHIPPED | OUTPATIENT
Start: 2019-08-05 | End: 2020-02-27

## 2019-08-05 RX ORDER — BUMETANIDE 2 MG/1
2 TABLET ORAL 2 TIMES DAILY
Qty: 60 TAB | Refills: 0 | Status: SHIPPED | OUTPATIENT
Start: 2019-08-05 | End: 2020-02-27 | Stop reason: SDUPTHER

## 2019-08-05 NOTE — TELEPHONE ENCOUNTER
Requested Prescriptions     Signed Prescriptions Disp Refills    carvedilol (COREG) 6.25 mg tablet 60 Tab 0     Sig: Take 1 Tab by mouth two (2) times daily (with meals). Also takes 25 mg. NEEDS TO MAKE AN OFFICE APPT BEFORE ANY FURTHER REFILLS ARE GIVEN     Authorizing Provider: Rock Shen     Ordering User: Renan Coughlin    bumetanide (BUMEX) 2 mg tablet 60 Tab 0     Sig: Take 1 Tab by mouth two (2) times a day.  PATIENT NEEDS TO MAKE AN OFFICE APPOINTMENT BEFORE ANY FURTHER REFILLS ARE GIVEN     Authorizing Provider: Rock Shen     Ordering User: Renan Coughlin    Per Dr. Aurelia Woo verbal order

## 2019-10-24 ENCOUNTER — OFFICE VISIT (OUTPATIENT)
Dept: CARDIOLOGY CLINIC | Age: 34
End: 2019-10-24

## 2019-10-24 DIAGNOSIS — Z95.810 AUTOMATIC IMPLANTABLE CARDIAC DEFIBRILLATOR IN SITU: Primary | ICD-10-CM

## 2019-12-01 DIAGNOSIS — I10 ESSENTIAL HYPERTENSION WITH GOAL BLOOD PRESSURE LESS THAN 130/80: ICD-10-CM

## 2019-12-01 DIAGNOSIS — R00.2 PALPITATIONS: ICD-10-CM

## 2019-12-01 DIAGNOSIS — I42.0 CARDIOMYOPATHY, DILATED, NONISCHEMIC (HCC): ICD-10-CM

## 2019-12-01 DIAGNOSIS — I10 ESSENTIAL HYPERTENSION: ICD-10-CM

## 2019-12-02 RX ORDER — LOSARTAN POTASSIUM 100 MG/1
TABLET ORAL
Qty: 90 TAB | Refills: 0 | Status: SHIPPED | OUTPATIENT
Start: 2019-12-02 | End: 2020-03-04

## 2020-01-28 ENCOUNTER — OFFICE VISIT (OUTPATIENT)
Dept: CARDIOLOGY CLINIC | Age: 35
End: 2020-01-28

## 2020-01-28 DIAGNOSIS — Z95.810 AUTOMATIC IMPLANTABLE CARDIAC DEFIBRILLATOR IN SITU: Primary | ICD-10-CM

## 2020-02-27 ENCOUNTER — OFFICE VISIT (OUTPATIENT)
Dept: CARDIOLOGY CLINIC | Age: 35
End: 2020-02-27

## 2020-02-27 VITALS
HEART RATE: 68 BPM | RESPIRATION RATE: 18 BRPM | BODY MASS INDEX: 43.32 KG/M2 | WEIGHT: 235.4 LBS | DIASTOLIC BLOOD PRESSURE: 90 MMHG | HEIGHT: 62 IN | SYSTOLIC BLOOD PRESSURE: 160 MMHG

## 2020-02-27 DIAGNOSIS — I47.1 PSVT (PAROXYSMAL SUPRAVENTRICULAR TACHYCARDIA) (HCC): ICD-10-CM

## 2020-02-27 DIAGNOSIS — I42.0 CARDIOMYOPATHY, DILATED, NONISCHEMIC (HCC): ICD-10-CM

## 2020-02-27 DIAGNOSIS — Z95.810 AUTOMATIC IMPLANTABLE CARDIAC DEFIBRILLATOR IN SITU: ICD-10-CM

## 2020-02-27 DIAGNOSIS — R00.2 PALPITATIONS: ICD-10-CM

## 2020-02-27 DIAGNOSIS — Z98.890 S/P ABLATION OF VENTRICULAR ARRHYTHMIA: ICD-10-CM

## 2020-02-27 DIAGNOSIS — I10 ESSENTIAL HYPERTENSION: Primary | ICD-10-CM

## 2020-02-27 DIAGNOSIS — R94.31 ABNORMAL EKG: ICD-10-CM

## 2020-02-27 DIAGNOSIS — I27.20 PULMONARY HYPERTENSION (HCC): ICD-10-CM

## 2020-02-27 DIAGNOSIS — Z86.79 S/P ABLATION OF VENTRICULAR ARRHYTHMIA: ICD-10-CM

## 2020-02-27 DIAGNOSIS — I07.9: ICD-10-CM

## 2020-02-27 DIAGNOSIS — I34.0 MITRAL VALVE INSUFFICIENCY, UNSPECIFIED ETIOLOGY: ICD-10-CM

## 2020-02-27 DIAGNOSIS — E66.01 OBESITY, CLASS III, BMI 40-49.9 (MORBID OBESITY) (HCC): ICD-10-CM

## 2020-02-27 RX ORDER — SPIRONOLACTONE 25 MG/1
25 TABLET ORAL DAILY
Qty: 30 TAB | Refills: 5 | Status: SHIPPED | OUTPATIENT
Start: 2020-02-27 | End: 2020-10-23 | Stop reason: SDUPTHER

## 2020-02-27 NOTE — PROGRESS NOTES
Visit Vitals  /90 (BP 1 Location: Left arm, BP Patient Position: Sitting)   Pulse 68   Resp 18   Ht 5' 2\" (1.575 m)   Wt 235 lb 6.4 oz (106.8 kg)   BMI 43.06 kg/m²

## 2020-02-27 NOTE — LETTER
2/27/2020 Ms. Darin Armijo [address] [facility] [id number] To Whom It May Concern: 
 
Darin Armijo is currently under the care of [address] [facility]. She was seen by me in the office today. If there are questions or concerns please have the patient contact our office.  
 
 
 
Sincerely, 
 
 
 
Delora Lanes, MD
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
## 2020-02-28 LAB
ALBUMIN SERPL-MCNC: 3.6 G/DL (ref 3.8–4.8)
ALBUMIN/GLOB SERPL: 1.1 {RATIO} (ref 1.2–2.2)
ALP SERPL-CCNC: 88 IU/L (ref 39–117)
ALT SERPL-CCNC: 14 IU/L (ref 0–32)
AST SERPL-CCNC: 17 IU/L (ref 0–40)
BILIRUB SERPL-MCNC: 0.7 MG/DL (ref 0–1.2)
BUN SERPL-MCNC: 10 MG/DL (ref 6–20)
BUN/CREAT SERPL: 12 (ref 9–23)
CALCIUM SERPL-MCNC: 9 MG/DL (ref 8.7–10.2)
CHLORIDE SERPL-SCNC: 104 MMOL/L (ref 96–106)
CO2 SERPL-SCNC: 25 MMOL/L (ref 20–29)
CREAT SERPL-MCNC: 0.84 MG/DL (ref 0.57–1)
ERYTHROCYTE [DISTWIDTH] IN BLOOD BY AUTOMATED COUNT: 12.8 % (ref 11.7–15.4)
GLOBULIN SER CALC-MCNC: 3.2 G/DL (ref 1.5–4.5)
GLUCOSE SERPL-MCNC: 89 MG/DL (ref 65–99)
HCT VFR BLD AUTO: 39.2 % (ref 34–46.6)
HGB BLD-MCNC: 12.8 G/DL (ref 11.1–15.9)
MCH RBC QN AUTO: 29 PG (ref 26.6–33)
MCHC RBC AUTO-ENTMCNC: 32.7 G/DL (ref 31.5–35.7)
MCV RBC AUTO: 89 FL (ref 79–97)
PLATELET # BLD AUTO: 208 X10E3/UL (ref 150–450)
POTASSIUM SERPL-SCNC: 4.5 MMOL/L (ref 3.5–5.2)
PROT SERPL-MCNC: 6.8 G/DL (ref 6–8.5)
RBC # BLD AUTO: 4.42 X10E6/UL (ref 3.77–5.28)
SODIUM SERPL-SCNC: 140 MMOL/L (ref 134–144)
TSH SERPL DL<=0.005 MIU/L-ACNC: 1.06 UIU/ML (ref 0.45–4.5)
WBC # BLD AUTO: 3 X10E3/UL (ref 3.4–10.8)

## 2020-03-18 ENCOUNTER — PATIENT MESSAGE (OUTPATIENT)
Dept: CARDIOLOGY CLINIC | Age: 35
End: 2020-03-18

## 2020-03-24 ENCOUNTER — PATIENT MESSAGE (OUTPATIENT)
Dept: CARDIOLOGY CLINIC | Age: 35
End: 2020-03-24

## 2020-04-02 NOTE — TELEPHONE ENCOUNTER
Called patient. Verified patient's identity with two identifiers. Notified her Dr. Hodan Morris signed forms and faxing to [facility]. Patient verbalized understanding and denied further questions or concerns.

## 2020-05-06 ENCOUNTER — OFFICE VISIT (OUTPATIENT)
Dept: CARDIOLOGY CLINIC | Age: 35
End: 2020-05-06

## 2020-05-06 DIAGNOSIS — Z95.810 AUTOMATIC IMPLANTABLE CARDIAC DEFIBRILLATOR IN SITU: Primary | ICD-10-CM

## 2020-05-07 ENCOUNTER — VIRTUAL VISIT (OUTPATIENT)
Dept: CARDIOLOGY CLINIC | Age: 35
End: 2020-05-07

## 2020-05-07 VITALS — WEIGHT: 235 LBS | HEIGHT: 62 IN | BODY MASS INDEX: 43.24 KG/M2

## 2020-05-07 DIAGNOSIS — I07.9: ICD-10-CM

## 2020-05-07 DIAGNOSIS — Z98.890 S/P ABLATION OF VENTRICULAR ARRHYTHMIA: ICD-10-CM

## 2020-05-07 DIAGNOSIS — I34.0 MITRAL VALVE INSUFFICIENCY, UNSPECIFIED ETIOLOGY: ICD-10-CM

## 2020-05-07 DIAGNOSIS — I47.1 PSVT (PAROXYSMAL SUPRAVENTRICULAR TACHYCARDIA) (HCC): ICD-10-CM

## 2020-05-07 DIAGNOSIS — I10 ESSENTIAL HYPERTENSION: Primary | ICD-10-CM

## 2020-05-07 DIAGNOSIS — Z86.79 S/P ABLATION OF VENTRICULAR ARRHYTHMIA: ICD-10-CM

## 2020-05-07 DIAGNOSIS — Z95.810 AUTOMATIC IMPLANTABLE CARDIAC DEFIBRILLATOR IN SITU: ICD-10-CM
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
## 2020-07-22 DIAGNOSIS — I42.0 CARDIOMYOPATHY, DILATED, NONISCHEMIC (HCC): ICD-10-CM

## 2020-07-22 DIAGNOSIS — I10 ESSENTIAL HYPERTENSION WITH GOAL BLOOD PRESSURE LESS THAN 130/80: ICD-10-CM

## 2020-07-22 DIAGNOSIS — R00.2 PALPITATIONS: ICD-10-CM

## 2020-07-22 DIAGNOSIS — I10 ESSENTIAL HYPERTENSION: ICD-10-CM

## 2020-07-22 RX ORDER — LOSARTAN POTASSIUM 100 MG/1
100 TABLET ORAL DAILY
Qty: 90 TAB | Refills: 0 | Status: SHIPPED | OUTPATIENT
Start: 2020-07-22 | End: 2020-10-23 | Stop reason: SDUPTHER

## 2020-07-22 NOTE — TELEPHONE ENCOUNTER
Requested Prescriptions     Signed Prescriptions Disp Refills    losartan (COZAAR) 100 mg tablet 90 Tab 0     Sig: Take 1 Tab by mouth daily.      Authorizing Provider: Rebeka Esposito     Ordering User: Ирина Ellis    Per Dr. Sierra Riding verbal order

## 2020-08-12 ENCOUNTER — OFFICE VISIT (OUTPATIENT)
Dept: CARDIOLOGY CLINIC | Age: 35
End: 2020-08-12
Payer: COMMERCIAL

## 2020-08-12 DIAGNOSIS — Z95.810 AUTOMATIC IMPLANTABLE CARDIAC DEFIBRILLATOR IN SITU: Primary | ICD-10-CM

## 2020-08-12 PROCEDURE — 93295 DEV INTERROG REMOTE 1/2/MLT: CPT | Performed by: INTERNAL MEDICINE

## 2020-08-12 PROCEDURE — 93296 REM INTERROG EVL PM/IDS: CPT | Performed by: INTERNAL MEDICINE

## 2020-10-23 DIAGNOSIS — I10 ESSENTIAL HYPERTENSION WITH GOAL BLOOD PRESSURE LESS THAN 130/80: ICD-10-CM

## 2020-10-23 DIAGNOSIS — I10 ESSENTIAL HYPERTENSION: ICD-10-CM

## 2020-10-23 DIAGNOSIS — I42.0 CARDIOMYOPATHY, DILATED, NONISCHEMIC (HCC): ICD-10-CM

## 2020-10-23 DIAGNOSIS — R00.2 PALPITATIONS: ICD-10-CM

## 2020-10-23 RX ORDER — BUMETANIDE 2 MG/1
2 TABLET ORAL
Qty: 180 TAB | Refills: 2 | Status: SHIPPED | OUTPATIENT
Start: 2020-10-23 | End: 2022-01-17

## 2020-10-23 RX ORDER — CARVEDILOL 25 MG/1
25 TABLET ORAL 2 TIMES DAILY WITH MEALS
Qty: 180 TAB | Refills: 3 | Status: SHIPPED | OUTPATIENT
Start: 2020-10-23 | End: 2022-01-17

## 2020-10-23 RX ORDER — SPIRONOLACTONE 25 MG/1
25 TABLET ORAL DAILY
Qty: 90 TAB | Refills: 3 | Status: SHIPPED | OUTPATIENT
Start: 2020-10-23 | End: 2022-01-17

## 2020-10-23 RX ORDER — LOSARTAN POTASSIUM 100 MG/1
100 TABLET ORAL DAILY
Qty: 90 TAB | Refills: 3 | Status: SHIPPED
Start: 2020-10-23 | End: 2021-06-29

## 2020-10-23 NOTE — TELEPHONE ENCOUNTER
Request for bumex 2mg PRN, coreg 25mg BID, spironolactone 25mg daily and losartan 100mg daily. Last office visit 5-7-20, next office visit 11-5-20. Refills per verbal order from Dr. Alphonso Kohler as Dr. Styles Dears is out of the office.

## 2020-10-26 ENCOUNTER — OFFICE VISIT (OUTPATIENT)
Dept: CARDIOLOGY CLINIC | Age: 35
End: 2020-10-26

## 2020-10-26 DIAGNOSIS — Z95.810 AUTOMATIC IMPLANTABLE CARDIAC DEFIBRILLATOR IN SITU: Primary | ICD-10-CM

## 2020-10-27 ENCOUNTER — VIRTUAL VISIT (OUTPATIENT)
Dept: CARDIOLOGY CLINIC | Age: 35
End: 2020-10-27
Payer: COMMERCIAL

## 2020-10-27 DIAGNOSIS — E66.01 OBESITY, CLASS III, BMI 40-49.9 (MORBID OBESITY) (HCC): ICD-10-CM

## 2020-10-27 DIAGNOSIS — I42.0 CARDIOMYOPATHY, DILATED, NONISCHEMIC (HCC): ICD-10-CM

## 2020-10-27 DIAGNOSIS — Z95.810 AUTOMATIC IMPLANTABLE CARDIAC DEFIBRILLATOR IN SITU: Primary | ICD-10-CM

## 2020-10-27 DIAGNOSIS — I47.1 PSVT (PAROXYSMAL SUPRAVENTRICULAR TACHYCARDIA) (HCC): ICD-10-CM

## 2020-10-27 DIAGNOSIS — I10 ESSENTIAL HYPERTENSION: ICD-10-CM

## 2020-10-27 PROCEDURE — 99213 OFFICE O/P EST LOW 20 MIN: CPT | Performed by: INTERNAL MEDICINE

## 2020-10-27 NOTE — PROGRESS NOTES
Subjective:         Cardiac Electrophysiology VIRTUAL VISIT Note     Pursuant to the emergency declaration under the [facility] waiver authority and the [employer] Act, this Virtual  Visit was conducted, with patient's consent, to reduce the patient's risk of exposure to COVID-19 and provide continuity of care for an established patient. Services were provided through a video synchronous discussion virtually to substitute for in-person clinic visit. Yoly Gaines is a 38 y.o. female who is seen for follow up, is s/p [employer] [facility] single lead ICD (DOI 01/30/2012) for postpartum dilated cardiomyopathy. Device check today shows proper lead & generator function. Approx 3.5 years generator longevity noted. Since 11/28/2018, 32 episodes of SVT (rates 150-157 bpm, lasting 17-70 seconds). She said she is either doing house work or plays with kids or exercise and hence heart rate went up    States she has been doing well. NYHA I-II symptoms. previous echo reported LVEF 41% & severely dilated LA with mild MR, TR, & AZ. Previous:  EP study 03/2018; there was no sustained SVT to map, and therefore no ablation was performed. PAC and nonsustained right sided atrial flutter which was not her clinical SVT seen on ICD check. Post-procedure, she had slight bleeding from the RFV site when she first got out of bed, but was easily controlled with manual compression & did not recur. Echo (01/12/2018): LVEF 25-35%, no RWMA, mod diffuse hypokinesis. RV mildly dilated. LA mod dilated. RA mildly dilated. Mild to mod MR. Mild TR. Mild to mod AZ. ICD placed due to syncope with cardiomyopathy. Diagnosed with postpartum cardiomyopathy in 2008. Followed by Dr. Dmitry Long.      She had ICD lead fractured (she was not compliant and followed up regularly at the time) and lead was extracted and reimplanted by me.    Problem List  Date Reviewed: 10/27/2020          Codes Class Noted    S/P ablation of ventricular arrhythmia ICD-10-CM: Z98.890, Z86.79  ICD-9-CM: V45.89  3/2/2018        PSVT (paroxysmal supraventricular tachycardia) ([facility] ICD-10-CM: I47.1  ICD-9-CM: 427.0  3/2/2018        Tricuspid inspiratory pansystolic murmur [id number]: I07.9  ICD-9-CM: 785.2  10/17/2013        Pulmonary hypertension ([facility] ICD-10-CM: I27.20  ICD-9-CM: 416.8  10/17/2013        Headache(784.0) ICD-10-CM: R51  ICD-9-CM: 784.0  4/17/2013        Abnormal EKG ICD-10-CM: R94.31  ICD-9-CM: 794.31  11/19/2012        Mitral regurgitation ICD-10-CM: I34.0  ICD-9-CM: 424.0  11/19/2012        BP (high blood pressure) ICD-10-CM: I10  ICD-9-CM: 401.9  7/30/2012        Single implantable cardioverter-defibrillator in situ ICD-10-CM: Z95.810  ICD-9-CM: V45.02  2/1/2012    Overview Signed 2/1/2012  7:00 AM by Eber Gonzalez MD     [facility] single lead single coil AICD implant 1/30/2012             Cardiomyopathy, dilated, nonischemic (HCC) ICD-10-CM: I42.0  ICD-9-CM: 425.4  Unknown        Morbid obesity ([facility] ICD-10-CM: E66.01  ICD-9-CM: 278.01  1/17/2012        Observed sleep apnea ICD-10-CM: G47.30  ICD-9-CM: 780.57  1/17/2012        Mitral valve disorder ICD-10-CM: I05.9  ICD-9-CM: 394.9  1/6/2012        Palpitations ICD-10-CM: R00.2  ICD-9-CM: 785.1  12/22/2011        Syncope and collapse ICD-10-CM: R55  ICD-9-CM: 780.2  12/22/2011        Cardiomyopathy, peripartum, postpartum ICD-10-CM: O90.3  ICD-9-CM: 674.54  12/22/2011        Heart murmur ICD-10-CM: R01.1  ICD-9-CM: 785.2  12/22/2011                Current Outpatient Medications   Medication Sig Dispense Refill    spironolactone (ALDACTONE) 25 mg tablet Take 1 Tab by mouth daily. 90 Tab 3    carvediloL (COREG) 25 mg tablet Take 1 Tab by mouth two (2) times daily (with meals). 180 Tab 3    losartan (COZAAR) 100 mg tablet Take 1 Tab by mouth daily.  90 Tab 3    bumetanide (BUMEX) 2 mg tablet Take 1 Tab by mouth two (2) times daily as needed (swelling). (Patient taking differently: Take 2 mg by mouth daily. 1 tab once a day as needed) 180 Tab 2    MULTIVITAMIN PO Take  by mouth. Takes one po once daily.  OTHER Takes iron po once daily. Will bring in strength.  b complex vitamins Liqd Take  by mouth daily. Allergies   Allergen Reactions    Bee Sting [Sting, Bee] Anaphylaxis    Lisinopril Cough     Past Medical History:   Diagnosis Date    Arrhythmia     BRADYCARDIA    Cardiomyopathy, dilated, nonischemic (HCC)     Congestive heart failure, unspecified     Essential hypertension     Heart failure ([address]     Hypertension     ICD (implantable cardiac defibrillator) in place     [facility] dual coil, single lead    Morbid obesity ([address]     Murmur     Syncope     Syncope and collapse 2011    TIA (transient ischemic attack)     Valvular heart disease      Past Surgical History:   Procedure Laterality Date    COLONOSCOPY N/A 3/21/2018    [facility] ENDOSCOPY    HX  SECTION      HX CHOLECYSTECTOMY      HX GASTRIC BYPASS      HX PACEMAKER      DEFIBRILLATOR    INS PPM/ICD LED SING ONLY  2012         WI COMPRE ELECTROPHYSIOL XM W/LEFT ATRIAL PACNG/REC  3/2/2018         WI COMPRE ELECTROPHYSIOLOGIC ARRHYTHMIA INDUCTION  3/2/2018         STIM/PACING HEART POST IV DRUG INFU  3/2/2018         BELLO DOPPLER  2013          No family history of sudden death. Social History     Tobacco Use    Smoking status: Never Smoker    Smokeless tobacco: Never Used   Substance Use Topics    Alcohol use: Yes     Alcohol/week: 0.0 standard drinks     Frequency: Monthly or less     Drinks per session: 1 or 2     Binge frequency: Never     Comment: socially      Review of Systems  Constitutional: Negative for fever, chills, unintentional weight loss, or malaise/fatigue. HEENT: Negative for nosebleeds, acute vision changes. Respiratory: Negative for cough, hemoptysis, sputum production, and wheezing. Cardiovascular: Negative for orthopnea, claudication, PND. Gastrointestinal: Negative for nausea, vomiting, blood in stool and melena. Genitourinary: Negative for dysuria, hematuria. Musculoskeletal: Negative for myalgias. Skin: Negative for rash and itching. Heme: Does not bleed or bruise easily. Neurological: Negative for speech change and focal weakness      Objective:   Due to this being a TeleHealth evaluation, many elements of the physical examination are unable to be assessed. General: Well developed, in no acute distress, cooperative and alert  HEENT: Pupils equal/round. No marked JVD visible on video. Respiratory: No audible wheezing, no signs of respiratory distress, lips non cyanotic  Extremities:  No edema  Neuro: A&Ox3, speech clear, no facial droop, answering questions appropriately  Skin:  Non diaphoretic on visible skin during exam         Assessment/Plan:       ICD-10-CM ICD-9-CM    1. Automatic implantable cardiac defibrillator in situ  Z95.810 V45.02    2. Essential hypertension  I10 401.9    3. Cardiomyopathy, dilated, nonischemic (HCC)  I42.0 425.4    4. PSVT (paroxysmal supraventricular tachycardia) ([facility])  I47.1 427.0    5. Obesity, Class III, BMI 40-49.9 (morbid obesity) ([facility]  E66.01 278.01      Ms. Sanderson is doing well, has good activity tolerance, is able to exercise    Final echocardiogram result will be done by Dr. Lani Humphrey. Device check shows proper lead & generator function, approx 3.5 years generator longevity. Multiple episodes SVT/PAT or sinus tach and they are not a new finding. Continue GDMT as previously prescribed, doses are maximal, managed by Dr. Lani Humphrey. She does not know her BP     Remote ICD checks q 3 months. EP clinic follow up in 1 year. Follow up with Dr. Lani Humphrey prn.         Future Appointments   Date Time Provider Shanae Vargas   10/27/2020  4:40 PM Steve Chan MD FABIOLA Corona BS AMB   11/5/2020  9:20 AM MD Sarahi Corley M.D.  [facility]  Electrophysiology/Cardiology  [facility] [place]  [address] , [address]  [phone]

## 2020-11-04 ENCOUNTER — VIRTUAL VISIT (OUTPATIENT)
Dept: CARDIOLOGY CLINIC | Age: 35
End: 2020-11-04
Payer: COMMERCIAL

## 2020-11-04 DIAGNOSIS — Z86.79 S/P ABLATION OF VENTRICULAR ARRHYTHMIA: ICD-10-CM

## 2020-11-04 DIAGNOSIS — Z98.890 S/P ABLATION OF VENTRICULAR ARRHYTHMIA: ICD-10-CM

## 2020-11-04 DIAGNOSIS — R00.2 PALPITATIONS: ICD-10-CM

## 2020-11-04 DIAGNOSIS — I42.0 CARDIOMYOPATHY, DILATED, NONISCHEMIC (HCC): Primary | ICD-10-CM

## 2020-11-04 DIAGNOSIS — I07.9: ICD-10-CM

## 2020-11-04 DIAGNOSIS — I47.1 PSVT (PAROXYSMAL SUPRAVENTRICULAR TACHYCARDIA) (HCC): ICD-10-CM

## 2020-11-04 DIAGNOSIS — E66.01 OBESITY, CLASS III, BMI 40-49.9 (MORBID OBESITY) (HCC): ICD-10-CM

## 2020-11-04 DIAGNOSIS — Z95.810 CARDIAC DEFIBRILLATOR IN SITU: ICD-10-CM

## 2020-11-04 DIAGNOSIS — I34.0 MITRAL VALVE INSUFFICIENCY, UNSPECIFIED ETIOLOGY: ICD-10-CM

## 2020-11-04 DIAGNOSIS — I10 ESSENTIAL HYPERTENSION: ICD-10-CM

## 2020-11-04 DIAGNOSIS — R94.31 ABNORMAL EKG: ICD-10-CM

## 2020-11-04 DIAGNOSIS — I27.20 PULMONARY HYPERTENSION (HCC): ICD-10-CM

## 2020-11-04 PROCEDURE — 99214 OFFICE O/P EST MOD 30 MIN: CPT | Performed by: SPECIALIST

## 2020-11-04 NOTE — PROGRESS NOTES
HISTORY OF PRESENT ILLNESS  Cassie Oneill is a 38 y.o. female. She had a virtual visit done today due to the viral pandemic and 25 minutes were spent on this visit. She has a history of nonischemic cardiomyopathy and ventricular and supraventricular tachycardia. She has a defibrillator in place. In the past she has had a loud murmur felt to be due to tricuspid insufficiency but this has improved over the past 1 to 2 years with improvement in her left ventricular function. Her last echocardiogram done more than a year ago showed ejection fraction of 41% which was an improvement from 30% noted previously. She is having a lot of numbness and discomfort in her hands felt to be carpal tunnel syndrome and is going for nerve conductions studies. She works from home and her blood pressure is usually in the range of 130/87. She does have episodes of supraventricular tachycardia but an attempt was made previously to do an ablation of this and it was unsuccessful. She has no symptoms from this. HPI  Patient Active Problem List   Diagnosis Code    Palpitations R00.2    Syncope and collapse R55    Cardiomyopathy, peripartum, postpartum O90.3    Heart murmur R01.1    Mitral valve disorder I05.9    Morbid obesity ([facility]) E66.01    Observed sleep apnea G47.30    Cardiomyopathy, dilated, nonischemic ([facility]) I42.0    Single implantable cardioverter-defibrillator in situ Z95.810    BP (high blood pressure) I10    Abnormal EKG R94.31    Mitral regurgitation I34.0    Headache(784.0) R51    Tricuspid inspiratory pansystolic murmur O29.3    Pulmonary hypertension ([facility]) I27.20    PSVT (paroxysmal supraventricular tachycardia) ([facility]) I47.1    S/P ablation of ventricular arrhythmia Z98.890, Z86.79     Current Outpatient Medications   Medication Sig Dispense Refill    spironolactone (ALDACTONE) 25 mg tablet Take 1 Tab by mouth daily.  90 Tab 3    carvediloL (COREG) 25 mg tablet Take 1 Tab by mouth two (2) times daily (with meals). 180 Tab 3    losartan (COZAAR) 100 mg tablet Take 1 Tab by mouth daily. 90 Tab 3    bumetanide (BUMEX) 2 mg tablet Take 1 Tab by mouth two (2) times daily as needed (swelling). (Patient taking differently: Take 2 mg by mouth as needed. 1 tab once a day as needed) 180 Tab 2    MULTIVITAMIN PO Take  by mouth. Takes one po once daily.  OTHER Takes iron po once daily. Will bring in strength.  b complex vitamins Liqd Take  by mouth daily. Past Medical History:   Diagnosis Date    Arrhythmia     BRADYCARDIA    Cardiomyopathy, dilated, nonischemic (HCC)     Congestive heart failure, unspecified     Essential hypertension     [facility] failure [facility]     Hypertension     ICD (implantable cardiac defibrillator) in place     [facility] dual coil, single lead    Morbid obesity ([facility]     Murmur     Syncope     Syncope and collapse 2011    TIA (transient ischemic attack)     Valvular heart disease      Past Surgical History:   Procedure Laterality Date    COLONOSCOPY N/A 3/21/2018    COL[facility]ul [facility] ENDOSCOPY    HX  SECTION      HX CHOLECYSTECTOMY      HX GASTRIC BYPASS      HX PACEMAKER      DEFIBRILLATOR    INS PPM/ICD LED SING ONLY  2012         AR COMPRE ELECTROPHYSIOL XM W/LEFT ATRIAL PACNG/REC  3/2/2018         AR COMPRE ELECTROPHYSIOLOGIC ARRHYTHMIA INDUCTION  3/2/2018         STIM/PACING HEART POST IV DRUG INFU  3/2/2018         BLELO DOPPLER  2013            Review of Systems   Neurological: Positive for sensory change and focal weakness. All other systems reviewed and are negative. Physical Exam   Constitutional: She appears well-nourished. No distress. HENT:   Head: Atraumatic. Eyes: EOM are normal.   Neck: Normal range of motion. Pulmonary/Chest: Effort normal. No respiratory distress. Musculoskeletal:         General: No edema. Neurological: She is alert. Skin: No rash noted. Psychiatric: Her behavior is normal.   Nursing note and vitals reviewed. ASSESSMENT and PLAN  She seems to be quite stable from a cardiac standpoint. I suspect she does have carpal tunnel syndrome and will need surgical correction of this. She needs to have another echocardiogram so when I see her back in 6 months hopefully in the office we can do an echo then.

## 2021-02-15 ENCOUNTER — OFFICE VISIT (OUTPATIENT)
Dept: CARDIOLOGY CLINIC | Age: 36
End: 2021-02-15
Payer: COMMERCIAL

## 2021-02-15 DIAGNOSIS — Z95.810 AUTOMATIC IMPLANTABLE CARDIAC DEFIBRILLATOR IN SITU: Primary | ICD-10-CM

## 2021-02-15 PROCEDURE — 93295 DEV INTERROG REMOTE 1/2/MLT: CPT | Performed by: INTERNAL MEDICINE

## 2021-02-15 PROCEDURE — 93296 REM INTERROG EVL PM/IDS: CPT | Performed by: INTERNAL MEDICINE

## 2021-02-15 NOTE — LETTER
2/15/2021 9:57 AM    Ms. Tonya Gilford  [address] [place]  [facility] [id number]          After careful review of your remote check of your implated device on 4-. I have concluded that your device is working properly. Your next automatic remote check ( from home) is scheduled for  5-. If you have any questions, please call [facility] [address] at [phone].          Additional Comments: ________________________________________________    __________________________________________________________________    __________________________________________________________________                Parisa De La Cruz MD [facility]

## 2021-05-05 ENCOUNTER — OFFICE VISIT (OUTPATIENT)
Dept: CARDIOLOGY CLINIC | Age: 36
End: 2021-05-05

## 2021-05-05 ENCOUNTER — ANCILLARY PROCEDURE (OUTPATIENT)
Dept: CARDIOLOGY CLINIC | Age: 36
End: 2021-05-05
Payer: COMMERCIAL

## 2021-05-05 VITALS — WEIGHT: 245 LBS | HEIGHT: 62 IN | BODY MASS INDEX: 45.08 KG/M2

## 2021-05-05 VITALS
RESPIRATION RATE: 18 BRPM | SYSTOLIC BLOOD PRESSURE: 140 MMHG | HEART RATE: 65 BPM | OXYGEN SATURATION: 96 % | HEIGHT: 62 IN | DIASTOLIC BLOOD PRESSURE: 98 MMHG | WEIGHT: 245 LBS | BODY MASS INDEX: 45.08 KG/M2

## 2021-05-05 DIAGNOSIS — I10 ESSENTIAL HYPERTENSION: ICD-10-CM

## 2021-05-05 DIAGNOSIS — R00.2 PALPITATIONS: ICD-10-CM

## 2021-05-05 DIAGNOSIS — I42.0 CARDIOMYOPATHY, DILATED, NONISCHEMIC (HCC): ICD-10-CM

## 2021-05-05 DIAGNOSIS — I27.20 PULMONARY HYPERTENSION (HCC): ICD-10-CM

## 2021-05-05 DIAGNOSIS — R94.31 ABNORMAL EKG: ICD-10-CM

## 2021-05-05 DIAGNOSIS — I34.0 MITRAL VALVE INSUFFICIENCY, UNSPECIFIED ETIOLOGY: ICD-10-CM

## 2021-05-05 DIAGNOSIS — I07.9: ICD-10-CM

## 2021-05-05 DIAGNOSIS — Z95.810 CARDIAC DEFIBRILLATOR IN SITU: ICD-10-CM

## 2021-05-05 DIAGNOSIS — E66.01 OBESITY, CLASS III, BMI 40-49.9 (MORBID OBESITY) (HCC): ICD-10-CM

## 2021-05-05 DIAGNOSIS — I47.1 PSVT (PAROXYSMAL SUPRAVENTRICULAR TACHYCARDIA) (HCC): ICD-10-CM

## 2021-05-05 DIAGNOSIS — Z86.79 S/P ABLATION OF VENTRICULAR ARRHYTHMIA: ICD-10-CM

## 2021-05-05 DIAGNOSIS — Z98.890 S/P ABLATION OF VENTRICULAR ARRHYTHMIA: ICD-10-CM

## 2021-05-05 LAB
ECHO AO ASC DIAM: 3.46 CM
ECHO AO ROOT DIAM: 3.39 CM
ECHO AV AREA PEAK VELOCITY: 2.74 CM2
ECHO AV AREA VTI: 2.92 CM2
ECHO AV AREA/BSA PEAK VELOCITY: 1.3 CM2/M2
ECHO AV AREA/BSA VTI: 1.4 CM2/M2
ECHO AV MEAN GRADIENT: 4.25 MMHG
ECHO AV PEAK GRADIENT: 6.59 MMHG
ECHO AV PEAK VELOCITY: 128.36 CM/S
ECHO AV VTI: 25.9 CM
ECHO IVC PROX: 1.52 CM
ECHO LA AREA 4C: 22.57 CM2
ECHO LA MAJOR AXIS: 4.47 CM
ECHO LA MINOR AXIS: 2.15 CM
ECHO LA VOL 2C: 91.13 ML (ref 22–52)
ECHO LA VOL 4C: 72.33 ML (ref 22–52)
ECHO LA VOL BP: 87.04 ML (ref 22–52)
ECHO LA VOL/BSA BIPLANE: 41.77 ML/M2 (ref 16–28)
ECHO LA VOLUME INDEX A2C: 43.73 ML/M2 (ref 16–28)
ECHO LA VOLUME INDEX A4C: 34.71 ML/M2 (ref 16–28)
ECHO LV E' LATERAL VELOCITY: 12.61 CM/S
ECHO LV E' SEPTAL VELOCITY: 5.32 CM/S
ECHO LV EDV A2C: 143.87 ML
ECHO LV EDV A4C: 154.24 ML
ECHO LV EDV BP: 154.11 ML (ref 56–104)
ECHO LV EDV INDEX A4C: 74 ML/M2
ECHO LV EDV INDEX BP: 74 ML/M2
ECHO LV EDV NDEX A2C: 69 ML/M2
ECHO LV EJECTION FRACTION A2C: 36 PERCENT
ECHO LV EJECTION FRACTION A4C: 31 PERCENT
ECHO LV EJECTION FRACTION BIPLANE: 33 PERCENT (ref 55–100)
ECHO LV ESV A2C: 92.53 ML
ECHO LV ESV A4C: 106.89 ML
ECHO LV ESV BP: 103.3 ML (ref 19–49)
ECHO LV ESV INDEX A2C: 44.4 ML/M2
ECHO LV ESV INDEX A4C: 51.3 ML/M2
ECHO LV ESV INDEX BP: 49.6 ML/M2
ECHO LV INTERNAL DIMENSION DIASTOLIC: 5.61 CM (ref 3.9–5.3)
ECHO LV INTERNAL DIMENSION SYSTOLIC: 4.99 CM
ECHO LV IVSD: 1.19 CM (ref 0.6–0.9)
ECHO LV MASS 2D: 312.4 G (ref 67–162)
ECHO LV MASS INDEX 2D: 149.9 G/M2 (ref 43–95)
ECHO LV POSTERIOR WALL DIASTOLIC: 1.4 CM (ref 0.6–0.9)
ECHO LVOT DIAM: 2.42 CM
ECHO LVOT PEAK GRADIENT: 2.32 MMHG
ECHO LVOT PEAK VELOCITY: 76.23 CM/S
ECHO LVOT SV: 75.7 ML
ECHO LVOT VTI: 16.42 CM
ECHO MV A VELOCITY: 59.81 CM/S
ECHO MV E DECELERATION TIME (DT): 203 MS
ECHO MV E VELOCITY: 68.35 CM/S
ECHO MV E/A RATIO: 1.14
ECHO MV E/E' LATERAL: 5.42
ECHO MV E/E' RATIO (AVERAGED): 9.13
ECHO MV E/E' SEPTAL: 12.85
ECHO PV MAX VELOCITY: 79.28 CM/S
ECHO PV PEAK INSTANTANEOUS GRADIENT SYSTOLIC: 2.51 MMHG
ECHO PV REGURGITANT MAX VELOCITY: 127 CM/S
ECHO RA MINOR AXIS: 3.88 CM
ECHO RV INTERNAL DIMENSION: 2.95 CM
ECHO RV TAPSE: 2.23 CM (ref 1.5–2)
ECHO TV REGURGITANT MAX VELOCITY: 254.78 CM/S
ECHO TV REGURGITANT PEAK GRADIENT: 25.96 MMHG
LA VOL DISK BP: 81.03 ML (ref 22–52)

## 2021-05-05 PROCEDURE — 93306 TTE W/DOPPLER COMPLETE: CPT | Performed by: SPECIALIST

## 2021-05-05 PROCEDURE — 99214 OFFICE O/P EST MOD 30 MIN: CPT | Performed by: SPECIALIST

## 2021-05-05 NOTE — PROGRESS NOTES
HISTORY OF PRESENT ILLNESS  Mary Palacios is a 38 y.o. female. She has a history of postpartum cardiomyopathy and hypertension. She has gained about 10 pounds over the past year due to the pandemic. Her blood pressure is elevated in the office today on therapy but she tells me that at home it is usually 125/85. She denies edema or shortness of breath. An echocardiogram in the office today shows mild left ventricular hypertrophy with estimated ejection fraction about 35%. He had been estimated to be as high as 40% by an echocardiogram done last year. She no longer has any significant tricuspid insufficiency. She does have a defibrillator in place. HPI  Patient Active Problem List   Diagnosis Code    Palpitations R00.2    Syncope and collapse R55    Cardiomyopathy, peripartum, postpartum O90.3    Heart murmur R01.1    Mitral valve disorder I05.9    Morbid obesity ([facility]) E66.01    Observed sleep apnea G47.30    Cardiomyopathy, dilated, nonischemic ([facility]) I42.0    Single implantable cardioverter-defibrillator in situ Z95.810    BP (high blood pressure) I10    Abnormal EKG R94.31    Mitral regurgitation I34.0    Headache(784.0) R51    Tricuspid inspiratory pansystolic murmur D96.1    Pulmonary hypertension ([facility]) I27.20    PSVT (paroxysmal supraventricular tachycardia) ([facility]) I47.1    S/P ablation of ventricular arrhythmia Z98.890, Z86.79     Current Outpatient Medications   Medication Sig Dispense Refill    spironolactone (ALDACTONE) 25 mg tablet Take 1 Tab by mouth daily. 90 Tab 3    carvediloL (COREG) 25 mg tablet Take 1 Tab by mouth two (2) times daily (with meals). 180 Tab 3    losartan (COZAAR) 100 mg tablet Take 1 Tab by mouth daily. 90 Tab 3    MULTIVITAMIN PO Take  by mouth. Takes one po once daily.  b complex vitamins Liqd Take  by mouth daily.  bumetanide (BUMEX) 2 mg tablet Take 1 Tab by mouth two (2) times daily as needed (swelling).  (Patient taking differently: Take 2 mg by mouth as needed. 1 tab once a day as needed) 180 Tab 2    OTHER Takes iron po once daily. Will bring in strength. Past Medical History:   Diagnosis Date    Arrhythmia     BRADYCARDIA    Cardiomyopathy, dilated, nonischemic (HCC)     Congestive heart failure, unspecified     Essential hypertension     Heart failure ([address]     Hypertension     ICD (implantable cardiac defibrillator) in place     [facility] dual coil, single lead    Morbid obesity ([address]     Murmur     Syncope     Syncope and collapse 2011    TIA (transient ischemic attack)     Valvular heart disease      Past Surgical History:   Procedure Laterality Date    COLONOSCOPY N/A 3/21/2018    COLONOSCOPY performed by Pasquale Colorado MD at [address] [place] [address] ENDOSCOPY    HX  SECTION      HX CHOLECYSTECTOMY      HX GASTRIC BYPASS      HX PACEMAKER      DEFIBRILLATOR    INS PPM/ICD LED SING ONLY  2012         SC COMPRE ELECTROPHYSIOL XM W/LEFT ATRIAL PACNG/REC  3/2/2018         SC COMPRE ELECTROPHYSIOLOGIC ARRHYTHMIA INDUCTION  3/2/2018         STIM/PACING HEART POST IV DRUG INFU  3/2/2018         BELLO DOPPLER  2013            Review of Systems   Constitutional: Negative. HENT: Negative. Eyes: Negative. Respiratory: Negative. Cardiovascular: Negative. Gastrointestinal: Negative. Musculoskeletal: Negative. Skin: Negative. Neurological: Negative. Endo/Heme/Allergies: Negative. Visit Vitals  BP (!) 140/98 (BP 1 Location: Left upper arm, BP Patient Position: Sitting, BP Cuff Size: Adult)   Pulse 65   Resp 18   Ht 5' 2\" (1.575 m)   Wt 245 lb (111.1 kg)   SpO2 96%   BMI 44.81 kg/m²       Physical Exam   Constitutional: She appears well-nourished. HENT:   Head: Atraumatic. Eyes: Conjunctivae are normal.   Neck: Neck supple. Cardiovascular: Normal rate, regular rhythm and normal heart sounds. Exam reveals no gallop and no friction rub. No murmur heard.   Pulmonary/Chest: Breath sounds normal. She has no wheezes. Abdominal: Bowel sounds are normal.   Musculoskeletal:         General: No edema. Neurological: She is alert. Skin: Skin is dry. Psychiatric: Her behavior is normal.   Nursing note and vitals reviewed. ASSESSMENT and PLAN  I talked to her today about switching from [facility] to [facility] for better blood pressure control as well as better treatment of her reduced left ventricular function which may have diminished over the past 12 months. It sounds like her insurance may pay for this but she will check on it. In the meantime she will get serious about trying to lose some weight. I will plan to see her back in 3 months. However if she is started on Entresto in the meantime then I will see her sooner. Otherwise she will continue the same medications. She does not take the Bumex on a daily basis but only as needed for swelling and not very often.

## 2021-05-24 ENCOUNTER — OFFICE VISIT (OUTPATIENT)
Dept: CARDIOLOGY CLINIC | Age: 36
End: 2021-05-24
Payer: COMMERCIAL

## 2021-05-24 DIAGNOSIS — Z95.810 CARDIAC DEFIBRILLATOR IN SITU: Primary | ICD-10-CM

## 2021-05-24 PROCEDURE — 93296 REM INTERROG EVL PM/IDS: CPT | Performed by: INTERNAL MEDICINE

## 2021-05-24 PROCEDURE — 93295 DEV INTERROG REMOTE 1/2/MLT: CPT | Performed by: INTERNAL MEDICINE

## 2021-06-28 ENCOUNTER — DOCUMENTATION ONLY (OUTPATIENT)
Dept: CARDIOLOGY CLINIC | Age: 36
End: 2021-06-28

## 2021-06-28 RX ORDER — AMIODARONE HYDROCHLORIDE 200 MG/1
400 TABLET ORAL 2 TIMES DAILY
Qty: 56 TABLET | Refills: 0 | Status: SHIPPED
Start: 2021-06-28 | End: 2021-06-29

## 2021-06-28 NOTE — PROGRESS NOTES
Verified patient with two types of identifiers. Patient states she is doing okay and is back in [place]. Notified of MD recommendations. Scheduled patient for follow up tomorrow at 1:30 pm. Verified pharmacy. Patient may wait until seeing Dr. Amisha Durán before starting. Patient verbalized understanding and will call with any other questions.       Future Appointments   Date Time Provider Shanae Vargas   6/29/2021  1:30 PM MD FABIOLA Byers BS AMB   8/5/2021  4:00 PM MD FABIOLA Ornelas BS AMB   8/30/2021 10:30 AM [email or web address] AMB   11/9/2021  1:40 PM PACEMAKER3, CARLIN RAIN AMB   11/9/2021  2:00 PM MD FABIOLA Byesr BS AMB

## 2021-06-28 NOTE — PROGRESS NOTES
[facility] transmission from [facility] ED     On 6-25-21 patient received ATPx3, a shock at 36J and 3 shocks at 40J for NSVT (single lead) with max V rate of 187bpm      I am not sure if she is still there  If she is in [place] I need to load her on amiodarone 400 mg bid and see her in office  She had last echo with Dr Juaquin Clifton LVEF 30%

## 2021-06-29 ENCOUNTER — DOCUMENTATION ONLY (OUTPATIENT)
Dept: CARDIOLOGY CLINIC | Age: 36
End: 2021-06-29

## 2021-06-29 ENCOUNTER — OFFICE VISIT (OUTPATIENT)
Dept: CARDIOLOGY CLINIC | Age: 36
End: 2021-06-29

## 2021-06-29 ENCOUNTER — CLINICAL SUPPORT (OUTPATIENT)
Dept: CARDIOLOGY CLINIC | Age: 36
End: 2021-06-29
Payer: COMMERCIAL

## 2021-06-29 VITALS
SYSTOLIC BLOOD PRESSURE: 122 MMHG | DIASTOLIC BLOOD PRESSURE: 80 MMHG | BODY MASS INDEX: 43.98 KG/M2 | HEART RATE: 69 BPM | HEIGHT: 62 IN | WEIGHT: 239 LBS

## 2021-06-29 DIAGNOSIS — Z45.02 ICD (IMPLANTABLE CARDIOVERTER-DEFIBRILLATOR) DISCHARGE: ICD-10-CM

## 2021-06-29 DIAGNOSIS — I10 ESSENTIAL HYPERTENSION: ICD-10-CM

## 2021-06-29 DIAGNOSIS — E83.42 HYPOMAGNESEMIA: ICD-10-CM

## 2021-06-29 DIAGNOSIS — I50.22 SYSTOLIC CHF, CHRONIC (HCC): ICD-10-CM

## 2021-06-29 DIAGNOSIS — I47.20 VT (VENTRICULAR TACHYCARDIA): ICD-10-CM

## 2021-06-29 DIAGNOSIS — Z95.810 CARDIAC DEFIBRILLATOR IN SITU: Primary | ICD-10-CM

## 2021-06-29 DIAGNOSIS — I47.1 SVT (SUPRAVENTRICULAR TACHYCARDIA) (HCC): ICD-10-CM

## 2021-06-29 DIAGNOSIS — I42.0 CARDIOMYOPATHY, DILATED, NONISCHEMIC (HCC): ICD-10-CM

## 2021-06-29 PROCEDURE — 93282 PRGRMG EVAL IMPLANTABLE DFB: CPT | Performed by: INTERNAL MEDICINE

## 2021-06-29 PROCEDURE — 93000 ELECTROCARDIOGRAM COMPLETE: CPT | Performed by: INTERNAL MEDICINE

## 2021-06-29 PROCEDURE — 99214 OFFICE O/P EST MOD 30 MIN: CPT | Performed by: INTERNAL MEDICINE

## 2021-06-29 RX ORDER — LANOLIN ALCOHOL/MO/W.PET/CERES
400 CREAM (GRAM) TOPICAL 2 TIMES DAILY
Qty: 180 TABLET | Refills: 1 | Status: SHIPPED | OUTPATIENT
Start: 2021-06-29 | End: 2022-07-11 | Stop reason: SDUPTHER

## 2021-06-29 RX ORDER — SACUBITRIL AND VALSARTAN 49; 51 MG/1; MG/1
1 TABLET, FILM COATED ORAL 2 TIMES DAILY
Qty: 180 TABLET | Refills: 1 | Status: SHIPPED | OUTPATIENT
Start: 2021-06-29 | End: 2022-07-11 | Stop reason: SDUPTHER

## 2021-06-29 RX ORDER — SACUBITRIL AND VALSARTAN 49; 51 MG/1; MG/1
1 TABLET, FILM COATED ORAL 2 TIMES DAILY
Qty: 28 TABLET | Refills: 0 | Status: SHIPPED | COMMUNITY
Start: 2021-06-29 | End: 2021-06-29 | Stop reason: SDUPTHER

## 2021-06-29 NOTE — PROGRESS NOTES
Received alert that on 06/25/2021 patient received ATP x 3, shock at 36J & 3 shocks at 40J for NSVT (single lead) with max V rate 187 bpm.    See Dr. Mesha Villegas response in other encounter.

## 2021-06-29 NOTE — PROGRESS NOTES
Cardiac Electrophysiology OFFICE Note     Gerardo Soria is a 38 y.o. female who is seen for follow up, had recent AT x 3, shock at 36J & 3 shocks at 40J for NSVT (single lead) with max V rate 187 bpm.     States she was sitting with her back up to a wall with speakers at a lounge when she suddenly felt ICD shock, had 1 alcoholic beverage prior.  No LOC.  No warning prior, & only symptom afterward was fatigue for a couple days. Went to [facility] afterward, had Mg 1.4 & K 3.5.  Not on supplement yet. Postpartum dilated cardiomyopathy, LVEF 30-35%.  NYHA I-II chronic systolic CHF.  States she has an excellent activity tolerance.  On appropriate GDMT    Previous:   EP study 03/2018; no sustained SVT to map, therefore no ablation was performed. [facility] and nonsustained right sided atrial flutter which was not her clinical SVT seen on ICD check.  Post-procedure, she had slight bleeding from the RFV site when she first got out of bed, but was easily controlled with manual compression & did not recur.       S/p [facility] single lead ICD (DOI 01/30/2012). Diagnosed with postpartum cardiomyopathy in 2008.  Followed by Dr. Mai Ross. She had ICD lead fractured (she was not compliant and followed up regularly at the time) and lead was extracted and reimplanted by me.        Problem List     S/P ablation of ventricular arrhythmia ICD-10-CM: Z98.890, Z86.79   ICD-9-CM: V45.89 3/2/2018     PSVT (paroxysmal supraventricular tachycardia) (HCC) ICD-10-CM: I47.1   ICD-9-CM: 427.0 3/2/2018     Tricuspid inspiratory pansystolic murmur ADT-92-YO: I07.9   ICD-9-CM: 785.2 10/17/2013     Pulmonary hypertension ([address] ICD-10-CM: I27.20   ICD-9-CM: 416.8 10/17/2013     Headache(784.0) ICD-10-CM: R51   ICD-9-CM: 784.0 4/17/2013     Abnormal EKG ICD-10-CM: R94.31   ICD-9-CM: 794.31 11/19/2012     Mitral regurgitation ICD-10-CM: I34.0   ICD-9-CM: 424.0 11/19/2012     BP (high blood pressure) ICD-10-CM: I10   ICD-9-CM: 401.9 7/30/2012     Single implantable cardioverter-defibrillator in situ ICD-10-CM: Z95.810   ICD-9-CM: V45.02 2/1/2012   Overview Signed 2/1/2012  7:00 AM by MD Leonard Rincon single lead single coil AICD implant 1/30/2012         Cardiomyopathy, dilated, nonischemic (HCC) ICD-10-CM: I42.0   ICD-9-CM: 425.4 Unknown     Morbid obesity ([address] ICD-10-CM: E66.01   ICD-9-CM: 278.01 1/17/2012     Observed sleep apnea ICD-10-CM: G47.30   ICD-9-CM: 780.57 1/17/2012     Mitral valve disorder ICD-10-CM: I05.9   ICD-9-CM: 394.9 1/6/2012     Palpitations ICD-10-CM: R00.2   ICD-9-CM: 785.1 12/22/2011     Syncope and collapse ICD-10-CM: R55   ICD-9-CM: 780.2 12/22/2011     Cardiomyopathy, peripartum, postpartum ICD-10-CM: O90.3   ICD-9-CM: 674.54 12/22/2011     Heart murmur ICD-10-CM: R01.1   ICD-9-CM: 785.2 12/22/2011       Current Outpatient Medications   Medication Sig Dispense Refill   · amiodarone (CORDARONE) 200 mg tablet Take 2 Tablets by mouth two (2) times a day for 14 days. 56 Tablet 0   · spironolactone (ALDACTONE) 25 mg tablet Take 1 Tab by mouth daily. 90 Tab 3   · carvediloL (COREG) 25 mg tablet Take 1 Tab by mouth two (2) times daily (with meals). 180 Tab 3   · losartan (COZAAR) 100 mg tablet Take 1 Tab by mouth daily. 90 Tab 3   · bumetanide (BUMEX) 2 mg tablet Take 1 Tab by mouth two (2) times daily as needed (swelling). (Patient taking differently: Take 2 mg by mouth as needed. 1 tab once a day as needed) 180 Tab 2   · MULTIVITAMIN PO Take  by mouth. Takes one po once daily. · OTHER Takes iron po once daily. Will bring in strength. · b complex vitamins Liqd Take  by mouth daily.      Allergies   Allergen Reactions   · Bee Sting [Sting, Bee] Anaphylaxis   · Bee Venom Protein (Honey Bee) Swelling   · Lisinopril Cough     Past Medical History:   Diagnosis Date   · Arrhythmia   BRADYCARDIA   · Cardiomyopathy, dilated, nonischemic (HCC)   · Congestive heart failure, unspecified   · Essential hypertension   · Heart failure (HCC)   · Hypertension   · ICD (implantable cardiac defibrillator) in place   [facility] dual coil, single lead   · Morbid obesity ([address]   · Murmur   · Syncope   · Syncope and collapse 2011   · TIA (transient ischemic attack)   · Valvular heart disease     Past Surgical History:   Procedure Laterality Date   · COLONOSCOPY N/A 3/21/2018   COLONOSCOPY performed by Horace Cordero MD at [facility] ENDOSCOPY   · HX  SECTION    · HX CHOLECYSTECTOMY    · HX GASTRIC BYPASS   · HX PACEMAKER   DEFIBRILLATOR   · INS PPM/ICD LED SING ONLY 2012     · DE COMPRE ELECTROPHYSIOL XM W/LEFT ATRIAL PACNG/REC 3/2/2018     · DE COMPRE ELECTROPHYSIOLOGIC ARRHYTHMIA INDUCTION 3/2/2018     · STIM/PACING HEART POST IV DRUG INFU 3/2/2018     · BELLO DOPPLER 2013       No family history of sudden death. Social History     Tobacco Use   · Smoking status: Never Smoker   · Smokeless tobacco: Never Used   Substance Use Topics   · Alcohol use: Yes   Alcohol/week: 0.0 standard drinks   Comment: socially       Review of Systems:  All other review of systems otherwise negative. Constitutional: Negative for fever, chills, unintentional weight loss, or malaise/fatigue. HEENT: Negative for nosebleeds, acute vision changes. Respiratory: Negative for cough, hemoptysis, sputum production, and wheezing. Cardiovascular: Negative for orthopnea, claudication, PND.  + recent ICD shock. Gastrointestinal: Negative for nausea, vomiting, blood in stool and melena. Genitourinary: Negative for dysuria, hematuria. Musculoskeletal: Negative for myalgias. Skin: Negative for rash and itching. Heme: Does not bleed or bruise easily.    Neurological: Negative for speech change and focal weakness.       Objective:     Visit Vitals   /80 (BP 1 Location: Left upper arm, BP Patient Position: Sitting)   Pulse 69   Ht 5' 2\" (1.575 m)   Wt 239 lb (108.4 kg)   BMI 43.71 kg/m²     Physical Exam:   Constitutional: Well-developed and well-nourished. No respiratory distress. Head: Normocephalic and atraumatic. Eyes: Pupils are equal, round. ENT: Hearing grossly normal.   Neck: Supple. No JVD present. Cardiovascular: Normal rate, regular rhythm. Exam reveals no gallop and no friction rub. No murmur heard. Pulmonary/Chest: Effort normal and breath sounds normal. No wheezes. Abdominal: Soft, no tenderness. Musculoskeletal: Moves extremities independently.  Normal gait. Vasc/lymphatic: No edema. Neurological: Alert,oriented. Skin: Skin is warm and dry. Psychiatric: Normal mood and affect. Behavior is normal. Judgment and thought content normal.         EKG: NSR and IVCD but not LBBB and < 120 ms    Assessment/Plan:     Imaging/Studies:   Echo (05/05/2021): LVEF 30-35%, mild concentric LVH, grade 1 diastolic dysfunction.  Mod dilated LA.  Mild MR. Mild TR.  Mild MN. ICD-10-CM ICD-9-CM    1. Cardiac defibrillator in situ  Z95.810 V45.02 AMB POC EKG ROUTINE W/ 12 LEADS, INTER & REP   2. Essential hypertension  I10 401.9 AMB POC EKG ROUTINE W/ 12 LEADS, INTER & REP   3. Cardiomyopathy, dilated, nonischemic (HCC)  I42.0 425.4 AMB POC EKG ROUTINE W/ 12 LEADS, INTER & REP   4. ICD (implantable cardioverter-defibrillator) discharge  Z45.02 V71.89 AMB POC EKG ROUTINE W/ 12 LEADS, INTER & REP   5. VT (ventricular tachycardia) ([facility])  I47.2 427.1 AMB POC EKG ROUTINE W/ 12 LEADS, INTER & REP   6. Hypomagnesemia  E83.42 275.2 AMB POC EKG ROUTINE W/ 12 LEADS, INTER & REP   7. Systolic CHF, chronic (HCC)  I50.22 428.22 AMB POC EKG ROUTINE W/ 12 LEADS, INTER & REP     428.0    8. SVT (supraventricular tachycardia) ([facility])  I47.1 427.89 AMB POC EKG ROUTINE W/ 12 LEADS, INTER & REP         [facility] single lead ICD (DOI 01/30/2012): Device check today shows proper lead & generator function.  Generator longevity estimated 1 yr, 7 mos.  ICD shocks on 06/25/2021, none since 06/26/2021.      NICM: LVEF 30-35%, down from previous.  NYHA I-II chronic systolic CHF.  Continue carvedilol & spironolactone, switch losartan to Entresto 49/51 mg po bid.       VT: Recent ICD shock. Dennis Hardy may have been cause.  Hold off on starting amiodarone for now, continue to monitor remotely.     No LBBB so no BIV ICD upgrade for now  with declining LVEF I recommend her change losartan to entresto  Hypomagnesemia: Mg 1.4 after ICD shock.  Start Mag-Ox 400 mg po bid. SVT/PAT: EP study 03/2018, but no sustained SVT to map, therefore no ablation was performed.         Remote ICD checks q 3 months.  EP clinic follow up in 1 year.  Follow up with Dr. Ksihan Bermeo as previously scheduled.          Future Appointments   Date Time Provider Shanae Vargas   8/5/2021  4:00 PM MD FABIOLA Hernandez BS AMB   10/6/2021  2:30 PM REMOTE1, CARLIN HICKS BS AMB   1/10/2022 11:15 AM REMOTE1, CARLIN HICKS BS AMB   4/18/2022  8:45 AM REMOTE1, CARLIN HICKS BS AMB   7/19/2022  1:00 PM PACEMAKER3, CARLIN HICKS BS AMB   7/19/2022  1:20 PM Orest Bank, MD Petra Cooks, M.D.  [facility]   Electrophysiology/Cardiology   [facility] [place]   [address], [place] [facility], [address] [place] [address]   [phone]

## 2021-08-25 ENCOUNTER — DOCUMENTATION ONLY (OUTPATIENT)
Dept: CARDIOLOGY CLINIC | Age: 36
End: 2021-08-25

## 2021-08-25 NOTE — PROGRESS NOTES
Single-chamber ICD alert that her battery is 1 year left.   On August 22, 2021, there was ventricular tachycardia with average rate of 184 bpm that lasted for 58 second total because it failed ATP sequences and was terminated with 36 J shock      She is recommended to have a sooner appointment than next year in July  Future Appointments   Date Time Provider Shanae Vargas   10/6/2021  2:30 PM REMOTE1, CARLIN RAIN AMB   1/10/2022 11:15 AM REMOTE1, CARLIN RAIN AMB   4/18/2022  8:45 AM REMOTE1, CARLIN RAIN AMB   7/19/2022  1:00 PM PACEMAKER3, CARLIN RAIN AMB   7/19/2022  1:20 PM MD FABIOLA Maki AMB

## 2021-08-26 ENCOUNTER — TELEPHONE (OUTPATIENT)
Dept: CARDIOLOGY CLINIC | Age: 36
End: 2021-08-26

## 2021-08-26 NOTE — TELEPHONE ENCOUNTER
Called patient. Verified patient's identity with two identifiers. Notified her Dr. Lucas Ferreira said no medical reason to except from vaccine from cardiac standpoint. Patient verbalized understanding and denied further questions or concerns.

## 2021-08-26 NOTE — TELEPHONE ENCOUNTER
Patient calling to speak to the nurse with questions and concerns about her situations          [phone]

## 2021-08-26 NOTE — TELEPHONE ENCOUNTER
Per Dr. Halle Forrest:    Serge Prader ICD alert that her battery is 1 year left. On August 22, 2021, there was ventricular tachycardia with average rate of 184 bpm that lasted for 58 second total because it failed ATP sequences and was terminated with 36 J shock        She is recommended to have a sooner appointment than next year in July    Identifiers x 2. States recent device shock when she was at bar in [place]. Concerned that abnormal rhythm is related to vibrations from music. Did not go to ER due to feeling fine. States previous shock in June/2021 was similar episode when she was listening to music. Agreeable to earlier appt with Dr. Halle Forrest. 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 Does not want to receive covid vaccine. Is requesting letter from Dr. Best German to exempt. Informed that we are advising all patients to receive vaccine and are not providing letters. She is requesting that Dr. Janneth Carey nurse give her a call back. Dr. Halle Forrest aware of the above.

## 2021-10-05 ENCOUNTER — PATIENT MESSAGE (OUTPATIENT)
Dept: CARDIOLOGY CLINIC | Age: 36
End: 2021-10-05

## 2021-10-26 ENCOUNTER — OFFICE VISIT (OUTPATIENT)
Dept: CARDIOLOGY CLINIC | Age: 36
End: 2021-10-26
Payer: COMMERCIAL

## 2021-10-26 ENCOUNTER — DOCUMENTATION ONLY (OUTPATIENT)
Dept: CARDIOLOGY CLINIC | Age: 36
End: 2021-10-26

## 2021-10-26 VITALS
SYSTOLIC BLOOD PRESSURE: 150 MMHG | HEART RATE: 62 BPM | BODY MASS INDEX: 44.72 KG/M2 | HEIGHT: 62 IN | WEIGHT: 243 LBS | DIASTOLIC BLOOD PRESSURE: 90 MMHG | RESPIRATION RATE: 18 BRPM

## 2021-10-26 DIAGNOSIS — I10 ESSENTIAL HYPERTENSION WITH GOAL BLOOD PRESSURE LESS THAN 130/80: ICD-10-CM

## 2021-10-26 DIAGNOSIS — E83.42 HYPOMAGNESEMIA: ICD-10-CM

## 2021-10-26 DIAGNOSIS — Z95.810 CARDIAC DEFIBRILLATOR IN SITU: ICD-10-CM

## 2021-10-26 DIAGNOSIS — Z09 HOSPITAL DISCHARGE FOLLOW-UP: ICD-10-CM

## 2021-10-26 DIAGNOSIS — I34.0 MITRAL VALVE INSUFFICIENCY, UNSPECIFIED ETIOLOGY: ICD-10-CM

## 2021-10-26 DIAGNOSIS — I42.0 CARDIOMYOPATHY, DILATED, NONISCHEMIC (HCC): ICD-10-CM

## 2021-10-26 DIAGNOSIS — I47.1 SVT (SUPRAVENTRICULAR TACHYCARDIA) (HCC): ICD-10-CM

## 2021-10-26 DIAGNOSIS — E66.01 OBESITY, CLASS III, BMI 40-49.9 (MORBID OBESITY) (HCC): ICD-10-CM

## 2021-10-26 DIAGNOSIS — Z86.79 S/P ABLATION OF VENTRICULAR ARRHYTHMIA: ICD-10-CM

## 2021-10-26 DIAGNOSIS — I47.20 VT (VENTRICULAR TACHYCARDIA): ICD-10-CM

## 2021-10-26 DIAGNOSIS — I50.22 SYSTOLIC CHF, CHRONIC (HCC): Primary | ICD-10-CM

## 2021-10-26 DIAGNOSIS — Z98.890 S/P ABLATION OF VENTRICULAR ARRHYTHMIA: ICD-10-CM

## 2021-10-26 DIAGNOSIS — I07.9: ICD-10-CM

## 2021-10-26 PROCEDURE — 99215 OFFICE O/P EST HI 40 MIN: CPT | Performed by: SPECIALIST

## 2021-10-26 PROCEDURE — 1111F DSCHRG MED/CURRENT MED MERGE: CPT | Performed by: SPECIALIST

## 2021-10-26 NOTE — LETTER
10/26/2021     Ms. Daniel Rodriguez  [address] [id number]      To Whom It May Concern:    Daniel Rodriguez is currently under the care of [address] She had a severe reaction to the first pfizer covid vaccine shot. From a cardiac standpoint, I have advised she not get the second covid shot. If there are questions or concerns please have the patient contact our office.         Sincerely,          Kiara Lincoln MD

## 2021-10-26 NOTE — PROGRESS NOTES
SVT 6/25/2021  ICD shock and did not terminate  She spoke to us  Future Appointments   Date Time Provider Shanae Vargas   1/10/2022 11:15 AM REMOTE1, CARLIN RAIN AMB   4/18/2022  8:45 AM REMOTE1, CARLIN RAIN AMB   4/26/2022 11:00 AM Neale Councilman, MD CAVREY BS AMB   7/19/2022  1:00 PM PACEMAKER3, CARLIN RAIN AMB   7/19/2022  1:20 PM MD FABIOLA Deng BS AMB

## 2021-10-26 NOTE — PROGRESS NOTES
HISTORY OF PRESENT ILLNESS  Stefani Macedo is a 39 y.o. female. She has a history of peripartum cardiomyopathy with extremely low ejection fraction in the range of 15% and normal coronary arteries by catheterization 9 years ago. Her ejection fraction has improved into the range of 30 5 repeat 40%. She has an implantable defibrillator which actually discharged this summer due to nonsustained ventricular tachycardia. She was seen by Dr. Demetra Loyd at this time who noted that she had low potassium and low magnesium. He placed her on replacement therapy and she has not had an issue since in that regard. Her last echocardiogram was actually done at [facility] where she was last week. She received the first shot at [facility] coronavirus vaccine about 2 weeks ago and had a severe reaction with shaking chills and rigors and cold sweats and shortness of breath. She finally called the rescue squad who took her to [facility] where she was found to have an elevated troponin. An echocardiogram was done but I do not have the results. She was eventually transferred to [facility] where she spent 1 more night. She is very concerned about getting the second shot for the vaccine because of her severe reaction. She works for [employer] and her employer is requiring the vaccination. Her last ejection fraction by echocardiography earlier this year was in the range of 30 up to 35%. She has had hypertension since receiving the vaccine. Her blood pressure previously was around 120/80. I do not have any records from her recent hospitalization.   HPI  Patient Active Problem List   Diagnosis Code    Palpitations R00.2    Syncope and collapse R55    Cardiomyopathy, peripartum, postpartum O90.3    Heart murmur R01.1    Mitral valve disorder I05.9    Morbid obesity ([facility] E66.01    Observed sleep apnea G47.30    Cardiomyopathy, dilated, nonischemic (HCC) I42.0    Single implantable cardioverter-defibrillator in situ Z95.810    BP (high blood pressure) I10    Abnormal EKG R94.31    Mitral regurgitation I34.0    Headache(784.0) R51    Tricuspid inspiratory pansystolic murmur T56.3    Pulmonary hypertension (HCC) I27.20    PSVT (paroxysmal supraventricular tachycardia) ([facility]) I47.1    S/P ablation of ventricular arrhythmia Z98.890, Z86.79     Current Outpatient Medications   Medication Sig Dispense Refill    sacubitriL-valsartan (Entresto) 49-51 mg tab tablet Take 1 Tablet by mouth two (2) times a day. 180 Tablet 1    magnesium oxide (MAG-OX) 400 mg tablet Take 1 Tablet by mouth two (2) times a day. 180 Tablet 1    spironolactone (ALDACTONE) 25 mg tablet Take 1 Tab by mouth daily. 90 Tab 3    carvediloL (COREG) 25 mg tablet Take 1 Tab by mouth two (2) times daily (with meals). 180 Tab 3    bumetanide (BUMEX) 2 mg tablet Take 1 Tab by mouth two (2) times daily as needed (swelling). (Patient taking differently: Take 2 mg by mouth as needed. 1 tab once a day as needed) 180 Tab 2    MULTIVITAMIN PO Take  by mouth. Takes one po once daily.  OTHER Takes iron po once daily. Will bring in strength.  b complex vitamins Liqd Take  by mouth daily.        Past Medical History:   Diagnosis Date    Arrhythmia     BRADYCARDIA    Cardiomyopathy, dilated, nonischemic (HCC)     Congestive heart failure, unspecified     Essential hypertension     Heart failure ([address]     Hypertension     ICD (implantable cardiac defibrillator) in place     [facility] John dual coil, single lead    Morbid obesity ([address]     Murmur     Syncope     Syncope and collapse 12/22/2011    TIA (transient ischemic attack)     Valvular heart disease      Past Surgical History:   Procedure Laterality Date    COLONOSCOPY N/A 3/21/2018    COLONOSCOPY performed by John Archuleta MD at [address] [place] [address]  2008    HX CHOLECYSTECTOMY  2010    HX GASTRIC BYPASS      HX PACEMAKER      DEFIBRILLATOR  INS PPM/ICD LED SING ONLY  1/30/2012         MA COMPRE ELECTROPHYSIOL XM W/LEFT ATRIAL PACNG/REC  3/2/2018         MA COMPRE ELECTROPHYSIOLOGIC ARRHYTHMIA INDUCTION  3/2/2018         STIM/PACING HEART POST IV DRUG INFU  3/2/2018         BELLO DOPPLER  2/25/2013            Review of Systems   Constitutional: Positive for diaphoresis and malaise/fatigue. Respiratory: Positive for shortness of breath. Cardiovascular: Positive for chest pain and palpitations. All other systems reviewed and are negative. Visit Vitals  BP (!) 150/90 (BP 1 Location: Left upper arm, BP Patient Position: Sitting, BP Cuff Size: Adult)   Pulse 62   Resp 18   Ht 5' 2\" (1.575 m)   Wt 243 lb (110.2 kg)   BMI 44.45 kg/m²       Physical Exam  Vitals and nursing note reviewed. Constitutional:       Appearance: She is obese. HENT:      Head: Normocephalic. Right Ear: External ear normal.      Left Ear: External ear normal.      Nose: Nose normal.      Mouth/Throat:      Mouth: Mucous membranes are moist.   Eyes:      Extraocular Movements: Extraocular movements intact. Cardiovascular:      Rate and Rhythm: Normal rate and regular rhythm. Heart sounds: Normal heart sounds. Pulmonary:      Breath sounds: Normal breath sounds. Abdominal:      Palpations: Abdomen is soft. Musculoskeletal:         General: Normal range of motion. Cervical back: Normal range of motion. Skin:     General: Skin is warm. Neurological:      General: No focal deficit present. Mental Status: She is alert. Psychiatric:         Behavior: Behavior normal.         ASSESSMENT and PLAN  She is gradually improving. It sounds like she had a very severe reaction including myocarditis to the first coronavirus injection. I advised her today to not receive the second shot because I suspect that her reaction would be even worse.   She may have been having ventricular tachycardia without another discharge of her defibrillator given her rapid palpitations. I will give her a note in this regard today for her employer. I will schedule her to return in 6 months but I will try to obtain records from Daryn at [facility] and we will call her if anything else needs to be done before hand.

## 2022-01-10 ENCOUNTER — OFFICE VISIT (OUTPATIENT)
Dept: CARDIOLOGY CLINIC | Age: 37
End: 2022-01-10
Payer: COMMERCIAL

## 2022-01-10 DIAGNOSIS — Z45.02 ICD (IMPLANTABLE CARDIOVERTER-DEFIBRILLATOR) DISCHARGE: Primary | ICD-10-CM

## 2022-01-10 PROCEDURE — 93295 DEV INTERROG REMOTE 1/2/MLT: CPT | Performed by: INTERNAL MEDICINE

## 2022-01-10 PROCEDURE — 93296 REM INTERROG EVL PM/IDS: CPT | Performed by: INTERNAL MEDICINE

## 2022-01-10 NOTE — LETTER
1/11/2022 8:33 AM    Ms. Jorge A Uribe  [address] [address]            This letter confirms that we have received your scheduled remote check of your implanted     device on 1-10-22  . Our EP team will contact you via phone if there are significant abnormal    findings. Your next remote check from home is scheduled for 4-18-22  . If you have any questions, please call [address] at [phone].                Sincerely,    Khoa Webb MD [facility]

## 2022-01-14 DIAGNOSIS — I10 ESSENTIAL HYPERTENSION WITH GOAL BLOOD PRESSURE LESS THAN 130/80: ICD-10-CM

## 2022-01-14 DIAGNOSIS — R00.2 PALPITATIONS: ICD-10-CM

## 2022-01-14 DIAGNOSIS — I42.0 CARDIOMYOPATHY, DILATED, NONISCHEMIC (HCC): ICD-10-CM

## 2022-01-14 DIAGNOSIS — I10 ESSENTIAL HYPERTENSION: ICD-10-CM

## 2022-01-17 RX ORDER — BUMETANIDE 2 MG/1
TABLET ORAL
Qty: 180 TABLET | Refills: 3 | Status: SHIPPED | OUTPATIENT
Start: 2022-01-17

## 2022-01-17 RX ORDER — SPIRONOLACTONE 25 MG/1
TABLET ORAL
Qty: 90 TABLET | Refills: 3 | Status: SHIPPED | OUTPATIENT
Start: 2022-01-17

## 2022-01-17 RX ORDER — CARVEDILOL 25 MG/1
TABLET ORAL
Qty: 180 TABLET | Refills: 3 | Status: SHIPPED | OUTPATIENT
Start: 2022-01-17

## 2022-01-17 NOTE — TELEPHONE ENCOUNTER
Requested Prescriptions     Signed Prescriptions Disp Refills    spironolactone (ALDACTONE) 25 mg tablet 90 Tablet 3     Sig: TAKE ONE TABLET BY MOUTH DAILY     Authorizing Provider: Kinsey BUCKLEY     Ordering User: Monster Gilbert    carvediloL (COREG) 25 mg tablet 180 Tablet 3     Sig: TAKE ONE TABLET BY MOUTH TWICE A DAY WITH A MEAL     Authorizing Provider: Jeane Kamara     Ordering User: Monster Gilbert    bumetanide (BUMEX) 2 mg tablet 180 Tablet 3     Sig: TAKE ONE TABLET BY MOUTH TWICE A DAY AS NEEDED     Authorizing Provider: Jeane Kamara     Ordering User: Monster Gilbert       Last office visit 10/26/21    Per Dr. Kristopher Langston   Date Time Provider Shanae Vargas   4/18/2022  8:45 AM REMOTE1, Remonia Boxer [facility] BS AMB   4/26/2022 11:00 AM Kaleta Oppenheim, MD [facility] BS AMB   7/19/2022  1:00 PM [email or web address], CARLIN HICKS BS AMB   7/19/2022  1:20 PM MD FABIOLA He BS AMB No

## 2022-01-21 NOTE — LETTER
2021 12:02 PM    Ms. Jackson Montgomery  [address]  [facility] [address]        Dear Patient,    This letter is to inform you that as of  [facility] will no longer be sending out confirmation letters for remote transmissions through the [employer]. All letters in the future will be posted in an electronic medical records format therefore we highly encourage enrollment in [employer] [facility] patient's portal. Once enrolled you will have access to any letters we send as well as appointment information that can be found in your medical record. Our EP team would contact you via phone if there are significant abnormal findings so you can discuss with Dr. Kaci Alonso further in office. Missed transmission letters will also be digitized in [employer] [facility] but we will continue to send those out through the mail as well. How to register for [employer] [facility]:    - Visit [facility]  - Click Create an Account  - Provide your name, address, and   - You will then be asked a short series of questions to verify your identity. This helps to ensure that no one else can access your information.   - If you have any further questions, please contact our office at [phone]. If you choose not to enroll in [employer] [facility] or do not have internet access, please kindly let device clinic know and we will accommodate your preference. We are grateful for your understanding and looking forward to this new, improved and more efficient way of communication.            Warm Regards,  [facility] Staff
5/25/2021 12:02 PM    Ms. Davis [facility]  [address] [place] [address]            After careful review of your remote check of your implated device on [id number]. I have concluded that your device is working properly. Your next:                Automatic remote check ( from home) is scheduled for  8-30-21                             If you have any questions, please call [address] at [phone].      Additional Comments: ________________________________________________    __________________________________________________________________    __________________________________________________________________              Vivi De La Cruz MD [facility]
[employer]

## 2022-03-18 PROBLEM — Z98.890 S/P ABLATION OF VENTRICULAR ARRHYTHMIA: Status: ACTIVE | Noted: 2018-03-02

## 2022-03-18 PROBLEM — I47.10 PSVT (PAROXYSMAL SUPRAVENTRICULAR TACHYCARDIA): Status: ACTIVE | Noted: 2018-03-02

## 2022-03-18 PROBLEM — Z86.79 S/P ABLATION OF VENTRICULAR ARRHYTHMIA: Status: ACTIVE | Noted: 2018-03-02

## 2022-03-18 PROBLEM — I47.1 PSVT (PAROXYSMAL SUPRAVENTRICULAR TACHYCARDIA) (HCC): Status: ACTIVE | Noted: 2018-03-02

## 2022-04-18 ENCOUNTER — OFFICE VISIT (OUTPATIENT)
Dept: CARDIOLOGY CLINIC | Age: 37
End: 2022-04-18
Payer: COMMERCIAL

## 2022-04-18 DIAGNOSIS — Z45.02 ICD (IMPLANTABLE CARDIOVERTER-DEFIBRILLATOR) DISCHARGE: Primary | ICD-10-CM

## 2022-04-18 PROCEDURE — 93296 REM INTERROG EVL PM/IDS: CPT | Performed by: INTERNAL MEDICINE

## 2022-04-18 PROCEDURE — 93295 DEV INTERROG REMOTE 1/2/MLT: CPT | Performed by: INTERNAL MEDICINE

## 2022-04-18 NOTE — LETTER
4/18/2022 9:28 AM    Ms. General Carie Allison [address] [address]            This letter confirms that we have received your scheduled remote check of your implanted     device on 4-18-22  . Our EP team will contact you via phone if there are significant abnormal    findings. Your next in-clinic device check is scheduled for 7-19-22 at 1:00pm  .                   If you have any questions, please call [address] [facility] at [phone].                Sincerely,    Enedelia Hernandez MD [facility]

## 2022-06-02 ENCOUNTER — TELEPHONE (OUTPATIENT)
Dept: CARDIOLOGY CLINIC | Age: 37
End: 2022-06-02

## 2022-06-02 NOTE — TELEPHONE ENCOUNTER
6/2/2022 at 12:16 left message to advise patient of appointment change from 7/19/2022 pacemaker check and Dr. Nura Marlow to 7/26/2022 pacemaker check and Beauty Alfredo., NP due to Dr. Andre Beams Tuesday schedule change

## 2022-07-05 ENCOUNTER — PATIENT MESSAGE (OUTPATIENT)
Dept: CARDIOLOGY CLINIC | Age: 37
End: 2022-07-05

## 2022-07-07 ENCOUNTER — APPOINTMENT (OUTPATIENT)
Dept: CT IMAGING | Age: 37
End: 2022-07-07
Attending: EMERGENCY MEDICINE
Payer: COMMERCIAL

## 2022-07-07 ENCOUNTER — HOSPITAL ENCOUNTER (EMERGENCY)
Age: 37
Discharge: HOME | End: 2022-07-07
Attending: EMERGENCY MEDICINE
Payer: COMMERCIAL

## 2022-07-07 ENCOUNTER — TELEPHONE (OUTPATIENT)
Dept: CARDIOLOGY CLINIC | Age: 37
End: 2022-07-07

## 2022-07-07 VITALS
TEMPERATURE: 98.6 F | BODY MASS INDEX: 44.93 KG/M2 | HEIGHT: 61 IN | SYSTOLIC BLOOD PRESSURE: 156 MMHG | DIASTOLIC BLOOD PRESSURE: 82 MMHG | HEART RATE: 68 BPM | WEIGHT: 238 LBS | OXYGEN SATURATION: 95 % | RESPIRATION RATE: 11 BRPM

## 2022-07-07 DIAGNOSIS — G44.029 CHRONIC CLUSTER HEADACHE, NOT INTRACTABLE: Primary | ICD-10-CM

## 2022-07-07 LAB
ALBUMIN SERPL-MCNC: 3.6 G/DL (ref 3.5–5)
ALBUMIN/GLOB SERPL: 0.9 {RATIO} (ref 1.1–2.2)
ALP SERPL-CCNC: 74 U/L (ref 45–117)
ALT SERPL-CCNC: 22 U/L (ref 12–78)
ANION GAP SERPL CALC-SCNC: 8 MMOL/L (ref 5–15)
APPEARANCE UR: ABNORMAL
AST SERPL-CCNC: 21 U/L (ref 15–37)
BACTERIA URNS QL MICRO: ABNORMAL /HPF
BASOPHILS # BLD: 0.1 K/UL (ref 0–0.1)
BASOPHILS NFR BLD: 1 % (ref 0–1)
BILIRUB SERPL-MCNC: 0.8 MG/DL (ref 0.2–1)
BILIRUB UR QL: NEGATIVE
BUN SERPL-MCNC: 9 MG/DL (ref 6–20)
BUN/CREAT SERPL: 11 (ref 12–20)
CALCIUM SERPL-MCNC: 9 MG/DL (ref 8.5–10.1)
CHLORIDE SERPL-SCNC: 109 MMOL/L (ref 97–108)
CO2 SERPL-SCNC: 22 MMOL/L (ref 21–32)
COLOR UR: ABNORMAL
COMMENT, HOLDF: NORMAL
CREAT SERPL-MCNC: 0.85 MG/DL (ref 0.55–1.02)
DIFFERENTIAL METHOD BLD: ABNORMAL
EOSINOPHIL # BLD: 0.5 K/UL (ref 0–0.4)
EOSINOPHIL NFR BLD: 9 % (ref 0–7)
EPITH CASTS URNS QL MICRO: ABNORMAL /LPF
ERYTHROCYTE [DISTWIDTH] IN BLOOD BY AUTOMATED COUNT: 13.9 % (ref 11.5–14.5)
GLOBULIN SER CALC-MCNC: 4.1 G/DL (ref 2–4)
GLUCOSE SERPL-MCNC: 93 MG/DL (ref 65–100)
GLUCOSE UR STRIP.AUTO-MCNC: >1000 MG/DL
HCT VFR BLD AUTO: 38.2 % (ref 35–47)
HGB BLD-MCNC: 12.5 G/DL (ref 11.5–16)
HGB UR QL STRIP: NEGATIVE
IMM GRANULOCYTES # BLD AUTO: 0 K/UL (ref 0–0.04)
IMM GRANULOCYTES NFR BLD AUTO: 0 % (ref 0–0.5)
KETONES UR QL STRIP.AUTO: 40 MG/DL
LEUKOCYTE ESTERASE UR QL STRIP.AUTO: NEGATIVE
LYMPHOCYTES # BLD: 1.8 K/UL (ref 0.8–3.5)
LYMPHOCYTES NFR BLD: 34 % (ref 12–49)
MCH RBC QN AUTO: 29 PG (ref 26–34)
MCHC RBC AUTO-ENTMCNC: 32.7 G/DL (ref 30–36.5)
MCV RBC AUTO: 88.6 FL (ref 80–99)
MONOCYTES # BLD: 0.5 K/UL (ref 0–1)
MONOCYTES NFR BLD: 9 % (ref 5–13)
NEUTS SEG # BLD: 2.6 K/UL (ref 1.8–8)
NEUTS SEG NFR BLD: 47 % (ref 32–75)
NITRITE UR QL STRIP.AUTO: NEGATIVE
NRBC # BLD: 0 K/UL (ref 0–0.01)
NRBC BLD-RTO: 0 PER 100 WBC
PH UR STRIP: 7 [PH] (ref 5–8)
PLATELET # BLD AUTO: 193 K/UL (ref 150–400)
PMV BLD AUTO: 12.2 FL (ref 8.9–12.9)
POTASSIUM SERPL-SCNC: 3.5 MMOL/L (ref 3.5–5.1)
PROT SERPL-MCNC: 7.7 G/DL (ref 6.4–8.2)
PROT UR STRIP-MCNC: NEGATIVE MG/DL
RBC # BLD AUTO: 4.31 M/UL (ref 3.8–5.2)
RBC #/AREA URNS HPF: ABNORMAL /HPF (ref 0–5)
SAMPLES BEING HELD,HOLD: NORMAL
SODIUM SERPL-SCNC: 139 MMOL/L (ref 136–145)
SP GR UR REFRACTOMETRY: >1.03 (ref 1–1.03)
TROPONIN-HIGH SENSITIVITY: 119 NG/L (ref 0–51)
TROPONIN-HIGH SENSITIVITY: 121 NG/L (ref 0–51)
UA: UC IF INDICATED,UAUC: ABNORMAL
UROBILINOGEN UR QL STRIP.AUTO: 1 EU/DL (ref 0.2–1)
WBC # BLD AUTO: 5.5 K/UL (ref 3.6–11)
WBC URNS QL MICRO: ABNORMAL /HPF (ref 0–4)

## 2022-07-07 PROCEDURE — 70450 CT HEAD/BRAIN W/O DYE: CPT

## 2022-07-07 PROCEDURE — 81001 URINALYSIS AUTO W/SCOPE: CPT

## 2022-07-07 PROCEDURE — 96361 HYDRATE IV INFUSION ADD-ON: CPT

## 2022-07-07 PROCEDURE — 96374 THER/PROPH/DIAG INJ IV PUSH: CPT

## 2022-07-07 PROCEDURE — 36415 COLL VENOUS BLD VENIPUNCTURE: CPT

## 2022-07-07 PROCEDURE — 87086 URINE CULTURE/COLONY COUNT: CPT

## 2022-07-07 PROCEDURE — 84484 ASSAY OF TROPONIN QUANT: CPT

## 2022-07-07 PROCEDURE — 74011250636 HC RX REV CODE- 250/636: Performed by: EMERGENCY MEDICINE

## 2022-07-07 PROCEDURE — 85025 COMPLETE CBC W/AUTO DIFF WBC: CPT

## 2022-07-07 PROCEDURE — 93005 ELECTROCARDIOGRAM TRACING: CPT

## 2022-07-07 PROCEDURE — 99284 EMERGENCY DEPT VISIT MOD MDM: CPT

## 2022-07-07 PROCEDURE — 80053 COMPREHEN METABOLIC PANEL: CPT

## 2022-07-07 RX ORDER — KETOROLAC TROMETHAMINE 30 MG/ML
30 INJECTION, SOLUTION INTRAMUSCULAR; INTRAVENOUS
Status: COMPLETED | OUTPATIENT
Start: 2022-07-07 | End: 2022-07-07

## 2022-07-07 RX ADMIN — SODIUM CHLORIDE 1000 ML: 9 INJECTION, SOLUTION INTRAVENOUS at 02:29

## 2022-07-07 RX ADMIN — KETOROLAC TROMETHAMINE 30 MG: 30 INJECTION, SOLUTION INTRAMUSCULAR at 05:04

## 2022-07-07 NOTE — ED NOTES
Pt given written and verbal d/c instructions. She verbalizes understanding of all instructions, incl. Meds and follow up. PIV d/c'd. Catheter tip intact. Pt discharged home with family in stable condition.

## 2022-07-07 NOTE — ED PROVIDER NOTES
26-year-old female with a past medical tree significant for anemia, cardiomyopathy, CHF, hypertension, heart failure, AICD placement, syncope and collapse, TIA, valvular heart disease, chronic headache who presents to the ER with a complaint of headache as she described tightness accompanied with chest tightness with nausea and diaphoresis. She was seen at [facility] emergency department for the same symptoms earlier today and had a negative evaluation. She denies any fever and chills, sore throat, cough or congestion, neck and back pain, nausea, vomiting, diarrhea, constipation, dysuria, dizziness, extremity weakness or numbness, sick contact, skin rash or recent travel.            Past Medical History:   Diagnosis Date    Arrhythmia     BRADYCARDIA    Cardiomyopathy, dilated, nonischemic (HCC)     Congestive heart failure, unspecified     Essential hypertension     Heart failure ([address]     Hypertension     ICD (implantable cardiac defibrillator) in place     [facility] John dual coil, single lead    Morbid obesity ([facility]     Murmur     Syncope     Syncope and collapse 2011    TIA (transient ischemic attack)     Valvular heart disease        Past Surgical History:   Procedure Laterality Date    COLONOSCOPY N/A 3/21/2018    COLONOSCOPY performed by Hawa Lofton MD at [facility] ENDOSCOPY    HX  SECTION      HX CHOLECYSTECTOMY      HX GASTRIC BYPASS      HX PACEMAKER      DEFIBRILLATOR    INS PPM/ICD LED SING ONLY  2012         PA COMPRE ELECTROPHYSIOL XM W/LEFT ATRIAL PACNG/REC  3/2/2018         PA COMPRE ELECTROPHYSIOLOGIC ARRHYTHMIA INDUCTION  3/2/2018         PA STIM/PACING HEART POST IV DRUG INFU  3/2/2018         BELLO DOPPLER  2013              Family History:   Problem Relation Age of Onset    Diabetes Paternal Grandmother        Social History     Socioeconomic History    Marital status: SINGLE     Spouse name: Not on file    Number of children: Not on file    Years of education: Not on file    Highest education level: Not on file   Occupational History    Not on file   Tobacco Use    Smoking status: Never Smoker    Smokeless tobacco: Never Used   Substance and Sexual Activity    Alcohol use: Yes     Alcohol/week: 4.0 standard drinks     Types: 4 Standard drinks or equivalent per week     Comment: socially    Drug use: No    Sexual activity: Not on file   Other Topics Concern    Not on file   Social History Narrative    Not on file     Social Determinants of Health     Financial Resource Strain:     Difficulty of Paying Living Expenses: Not on file   Food Insecurity:     Worried About Running Out of Food in the Last Year: Not on file    Jodie of Food in the Last Year: Not on file   Transportation Needs:     Lack of Transportation (Medical): Not on file    Lack of Transportation (Non-Medical): Not on file   Physical Activity:     Days of Exercise per Week: Not on file    Minutes of Exercise per Session: Not on file   Stress:     Feeling of Stress : Not on file   Social Connections:     Frequency of Communication with Friends and Family: Not on file    Frequency of Social Gatherings with Friends and Family: Not on file    Attends [employer] Services: Not on file    Active Member of [address] [place] [address] or Organizations: Not on file    Attends Club or Organization Meetings: Not on file    Marital Status: Not on file   Intimate Partner Violence:     Fear of Current or Ex-Partner: Not on file    Emotionally Abused: Not on file    Physically Abused: Not on file    Sexually Abused: Not on file   Housing Stability:     Unable to Pay for Housing in the Last Year: Not on file    Number of [place] in the Last Year: Not on file    Unstable Housing in the Last Year: Not on file         ALLERGIES: Bee sting [sting, bee]; Bee venom protein (honey bee);  Activase [alteplase]; and Lisinopril    Review of Systems   All other systems reviewed and are negative. Vitals:    07/07/22 0059   Weight: 108 kg (238 lb)   Height: 5' 1\" (1.549 m)            Physical Exam  Vitals and nursing note reviewed. CONSTITUTIONAL: Well-appearing; well-nourished; in no apparent distress  HEAD: Normocephalic; atraumatic  EYES: PERRL; EOM intact; conjunctiva and sclera are clear bilaterally. ENT: No rhinorrhea; normal pharynx with no tonsillar hypertrophy; mucous membranes pink/moist, no erythema, no exudate. NECK: Supple; non-tender; no cervical lymphadenopathy  CARD: Normal S1, S2; no murmurs, rubs, or gallops. Regular rate and rhythm. RESP: Normal respiratory effort; breath sounds clear and equal bilaterally; no wheezes, rhonchi, or rales. ABD: Normal bowel sounds; non-distended; non-tender; no palpable organomegaly, no masses, no bruits. Back Exam: Normal inspection; no vertebral point tenderness, no CVA tenderness. Normal range of motion. EXT: Normal ROM in all four extremities; non-tender to palpation; no swelling or deformity; distal pulses are normal, no edema. SKIN: Warm; dry; no rash. NEURO:Alert and oriented x 3, coherent, CLYDE-XII grossly intact, sensory and motor are non-focal.        MDM  Number of Diagnoses or Management Options  Diagnosis management comments: Assessment: 50-year-old female who presents to the ER for evaluation for headache and chest pain who has a fairly benign exam with stable vital signs and appears well. She has no objective findings. Suspect panic anxiety syndrome rule out ACS and ICH. Plan: Lab/IV fluid/EKG/CT scan of the head/education, reassurance, symptomatic treatment/ Monitor and Reevaluate.          Amount and/or Complexity of Data Reviewed  Clinical lab tests: ordered and reviewed  Tests in the radiology section of CPT®: ordered and reviewed  Tests in the medicine section of CPT®: reviewed and ordered  Discussion of test results with the performing providers: yes  Decide to obtain previous medical records or to obtain history from someone other than the patient: yes  Obtain history from someone other than the patient: yes  Review and summarize past medical records: yes  Discuss the patient with other providers: yes  Independent visualization of images, tracings, or specimens: yes    Risk of Complications, Morbidity, and/or Mortality  Presenting problems: moderate  Diagnostic procedures: moderate  Management options: moderate           Procedures    ED EKG interpretation:  Rhythm: normal sinus rhythm; and regular . Rate (approx.): 70; Axis: left axis deviation; P wave: normal; QRS interval: normal ; ST/T wave: non-specific changes; in  Lead: Diffusely; Other findings: abnormal ekg. This EKG was interpreted by Ayo Clement MD,ED Provider. Repeat ED EKG interpretation:  Rhythm: normal sinus rhythm; and regular . Rate (approx.): 71; Axis: left axis deviation; P wave: normal; QRS interval: normal ; ST/T wave: non-specific changes; in  Lead: Diffusely; LVH by voltage; Other findings: abnormal ekg. when compared, this EKG is unchanged from the 1 performed 3 hours ago. This EKG was interpreted by Ayo Clement MD,ED Provider. 05:00 AM    PROGRESS NOTE:  Thank you pt has been reexamined by Varun Tapia MD all available results have been reviewed with pt and any available family. The patient was discharged from [facility] after a 3-day stay during which time the patient had an extensive work-up that included a neurologic evaluation as well as cardiac evaluation that were within normal limit. The CT of the head findings were consistent with the same report on 2 days study which consists of a bilateral cerebellar infarct and encephalomalacia. The patient also had a CT in June and April with same findings at [facility].  She had extensive cardiac evaluation with elevated troponin that did not reveal any significant abnormality as well.   The patient is stable to go home with outpatient referral to the cardiologist as elevated troponin appears to be chronic. Patient denies any chest discomfort and only complaining of headache at this time. .    Written by Bishop Tha MD,  5:23 AM    .

## 2022-07-07 NOTE — TELEPHONE ENCOUNTER
Patient is calling because she was hospitalized on 2 different incidents and is calling to see if the medication she received from the neurologist is not going to interact with her heart medication. Medication that the neurologist has given her is butalbital  40. Daryn prescribed her farxiga 10 mg as well as the other medicine.     [phone]

## 2022-07-07 NOTE — ED TRIAGE NOTES
Patient arrives via EMS from home for complaints of chest pain and headache since 2300    Has history of stroke with right side residual (tingling), defibrillator     Patient to ER 22 to obtain EKG at time of arrival

## 2022-07-08 LAB
BACTERIA SPEC CULT: NORMAL
CC UR VC: NORMAL
SERVICE CMNT-IMP: NORMAL

## 2022-07-08 NOTE — TELEPHONE ENCOUNTER
Called pt. Verified patient's identity with two identifiers. Notified her Dr. Harriet Khalil said these 2 medications were fine to take. She will keep appt scheduled for Monday and denied further questions or concerns.

## 2022-07-10 LAB
ATRIAL RATE: 71 BPM
ATRIAL RATE: 72 BPM
ATRIAL RATE: 79 BPM
CALCULATED P AXIS, ECG09: 15 DEGREES
CALCULATED P AXIS, ECG09: 22 DEGREES
CALCULATED P AXIS, ECG09: 36 DEGREES
CALCULATED R AXIS, ECG10: -3 DEGREES
CALCULATED R AXIS, ECG10: -4 DEGREES
CALCULATED R AXIS, ECG10: 1 DEGREES
CALCULATED T AXIS, ECG11: 17 DEGREES
CALCULATED T AXIS, ECG11: 19 DEGREES
CALCULATED T AXIS, ECG11: 28 DEGREES
DIAGNOSIS, 93000: NORMAL
P-R INTERVAL, ECG05: 180 MS
P-R INTERVAL, ECG05: 180 MS
P-R INTERVAL, ECG05: 188 MS
Q-T INTERVAL, ECG07: 412 MS
Q-T INTERVAL, ECG07: 416 MS
Q-T INTERVAL, ECG07: 430 MS
QRS DURATION, ECG06: 104 MS
QRS DURATION, ECG06: 104 MS
QRS DURATION, ECG06: 108 MS
QTC CALCULATION (BEZET), ECG08: 455 MS
QTC CALCULATION (BEZET), ECG08: 467 MS
QTC CALCULATION (BEZET), ECG08: 472 MS
VENTRICULAR RATE, ECG03: 71 BPM
VENTRICULAR RATE, ECG03: 72 BPM
VENTRICULAR RATE, ECG03: 79 BPM

## 2022-07-11 ENCOUNTER — OFFICE VISIT (OUTPATIENT)
Dept: CARDIOLOGY CLINIC | Age: 37
End: 2022-07-11
Payer: COMMERCIAL

## 2022-07-11 VITALS
BODY MASS INDEX: 43.61 KG/M2 | DIASTOLIC BLOOD PRESSURE: 70 MMHG | WEIGHT: 231 LBS | SYSTOLIC BLOOD PRESSURE: 110 MMHG | HEIGHT: 61 IN | HEART RATE: 60 BPM | RESPIRATION RATE: 16 BRPM

## 2022-07-11 DIAGNOSIS — Z98.890 S/P ABLATION OF VENTRICULAR ARRHYTHMIA: ICD-10-CM

## 2022-07-11 DIAGNOSIS — E66.01 MORBID OBESITY (HCC): ICD-10-CM

## 2022-07-11 DIAGNOSIS — I34.0 NONRHEUMATIC MITRAL VALVE REGURGITATION: ICD-10-CM

## 2022-07-11 DIAGNOSIS — I10 ESSENTIAL HYPERTENSION WITH GOAL BLOOD PRESSURE LESS THAN 130/80: ICD-10-CM

## 2022-07-11 DIAGNOSIS — I42.0 CARDIOMYOPATHY, DILATED, NONISCHEMIC (HCC): Primary | ICD-10-CM

## 2022-07-11 DIAGNOSIS — Z45.02 ICD (IMPLANTABLE CARDIOVERTER-DEFIBRILLATOR) DISCHARGE: ICD-10-CM

## 2022-07-11 DIAGNOSIS — I47.20 VT (VENTRICULAR TACHYCARDIA): ICD-10-CM

## 2022-07-11 DIAGNOSIS — I47.1 SVT (SUPRAVENTRICULAR TACHYCARDIA) (HCC): ICD-10-CM

## 2022-07-11 DIAGNOSIS — R00.2 PALPITATIONS: ICD-10-CM

## 2022-07-11 DIAGNOSIS — Z86.79 S/P ABLATION OF VENTRICULAR ARRHYTHMIA: ICD-10-CM

## 2022-07-11 DIAGNOSIS — R77.8 TROPONIN LEVEL ELEVATED: ICD-10-CM

## 2022-07-11 PROCEDURE — 99215 OFFICE O/P EST HI 40 MIN: CPT | Performed by: SPECIALIST

## 2022-07-11 RX ORDER — DAPAGLIFLOZIN 10 MG/1
TABLET, FILM COATED ORAL
COMMUNITY
Start: 2022-07-06 | End: 2022-10-24

## 2022-07-11 RX ORDER — SACUBITRIL AND VALSARTAN 49; 51 MG/1; MG/1
1 TABLET, FILM COATED ORAL 2 TIMES DAILY
Qty: 180 TABLET | Refills: 3 | Status: SHIPPED | OUTPATIENT
Start: 2022-07-11

## 2022-07-11 RX ORDER — POTASSIUM CHLORIDE 750 MG/1
10 TABLET, EXTENDED RELEASE ORAL DAILY
Qty: 90 TABLET | Refills: 3 | Status: SHIPPED | OUTPATIENT
Start: 2022-07-11

## 2022-07-11 RX ORDER — LANOLIN ALCOHOL/MO/W.PET/CERES
400 CREAM (GRAM) TOPICAL 2 TIMES DAILY
Qty: 180 TABLET | Refills: 3 | Status: SHIPPED | OUTPATIENT
Start: 2022-07-11

## 2022-07-11 NOTE — PROGRESS NOTES
HISTORY OF PRESENT ILLNESS  Nita Vallejo is a 39 y.o. female. 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 Her ejection fraction has improved to the range of 35 to 40%. She had severe symptoms following a shot of the Pfizer coronavirus vaccine in June and her troponin was slightly elevated. She may have had an ICD discharge at that time possibly due to supraventricular tachycardia and not ventricular tachycardia. She has developed migraines since the coronavirus vaccination. She recently went to [facility] for migraines and also developed some chest pain and shortness of breath. The troponin was elevated about 120. She was eventually released but then wound up in the emergency room at [facility] the next day again for the same symptoms. She is going to see a neurologist regarding her migraines. She has lost 12 pounds since I last saw her. She feels well at present. She saw a cardiologist at [facility] and was told to start [place] and also to double her carvedilol dosage. She has not filled the [place] prescription. She does not take Bumex every day but only for swelling. I reviewed all of her extensive records from [facility] as well as from [facility] today.   HPI  Patient Active Problem List   Diagnosis Code    Palpitations R00.2    Syncope and collapse R55    Cardiomyopathy, peripartum, postpartum O90.3    Heart murmur R01.1    Mitral valve disorder I05.9    Morbid obesity (HCC) E66.01    Observed sleep apnea G47.30    Cardiomyopathy, dilated, nonischemic (HCC) I42.0    Single implantable cardioverter-defibrillator in situ Z95.810    BP (high blood pressure) I10    Abnormal EKG R94.31    Mitral regurgitation I34.0    Headache(784.0) R51    Tricuspid inspiratory pansystolic murmur X69.5    Pulmonary hypertension (HCC) I27.20    PSVT (paroxysmal supraventricular tachycardia) ([facility]) I47.1    S/P ablation of ventricular arrhythmia Z98.890, Z86.79     Current Outpatient Medications   Medication Sig Dispense Refill    Farxiga 10 mg tab tablet       potassium chloride (KLOR-CON M10) 10 mEq tablet Take 1 Tablet by mouth daily. 90 Tablet 3    sacubitriL-valsartan (Entresto) 49-51 mg tab tablet Take 1 Tablet by mouth two (2) times a day. 180 Tablet 3    magnesium oxide (MAG-OX) 400 mg tablet Take 1 Tablet by mouth two (2) times a day. 180 Tablet 3    spironolactone (ALDACTONE) 25 mg tablet TAKE ONE TABLET BY MOUTH DAILY 90 Tablet 3    carvediloL (COREG) 25 mg tablet TAKE ONE TABLET BY MOUTH TWICE A DAY WITH A MEAL 180 Tablet 3    bumetanide (BUMEX) 2 mg tablet TAKE ONE TABLET BY MOUTH TWICE A DAY AS NEEDED 180 Tablet 3    MULTIVITAMIN PO Take  by mouth. Takes one po once daily.  OTHER Takes iron po once daily. Will bring in strength.  b complex vitamins Liqd Take  by mouth daily.        Past Medical History:   Diagnosis Date    Arrhythmia     BRADYCARDIA    Cardiomyopathy, dilated, nonischemic (HCC)     Congestive heart failure, unspecified     Essential hypertension     Heart failure ([address]     Hypertension     ICD (implantable cardiac defibrillator) in place     [facility] dual coil, single lead    Morbid obesity ([address]     Murmur     Syncope     Syncope and collapse 2011    TIA (transient ischemic attack)     Valvular heart disease      Past Surgical History:   Procedure Laterality Date    COLONOSCOPY N/A 3/21/2018    COLONOSCOPY performed by Rima Strauss MD at [address] [place] [address] ENDOSCOPY    HX  SECTION      HX CHOLECYSTECTOMY      HX GASTRIC BYPASS      HX PACEMAKER      DEFIBRILLATOR    INS PPM/ICD LED SING ONLY  2012         CT COMPRE ELECTROPHYSIOL XM W/LEFT ATRIAL PACNG/REC  3/2/2018         CT COMPRE ELECTROPHYSIOLOGIC ARRHYTHMIA INDUCTION  3/2/2018         CT STIM/PACING HEART POST IV DRUG INFU 3/2/2018         BELLO DOPPLER  2/25/2013            Review of Systems   Respiratory: Positive for shortness of breath. Cardiovascular: Positive for chest pain and palpitations. Neurological: Positive for headaches. All other systems reviewed and are negative. Visit Vitals  /70   Pulse 60   Resp 16   Ht 5' 1\" (1.549 m)   Wt 231 lb (104.8 kg)   BMI 43.65 kg/m²       Physical Exam  Vitals and nursing note reviewed. Constitutional:       Appearance: She is obese. HENT:      Head: Normocephalic. Right Ear: External ear normal.      Nose: Nose normal.      Mouth/Throat:      Mouth: Mucous membranes are moist.   Cardiovascular:      Rate and Rhythm: Normal rate and regular rhythm. Heart sounds: Normal heart sounds. Pulmonary:      Breath sounds: Normal breath sounds. Abdominal:      Palpations: Abdomen is soft. Musculoskeletal:         General: Normal range of motion. Cervical back: Normal range of motion. Skin:     General: Skin is warm. Neurological:      Mental Status: She is alert and oriented to person, place, and time. Psychiatric:         Behavior: Behavior normal.         ASSESSMENT and PLAN  Is unclear why her troponin is mildly elevated when checked but she does not have coronary disease in my estimation. Given her low blood pressure and sometimes dizziness I do not think that increasing her carvedilol would be useful. I also do not think she needs Oly Aletha because the only benefit would be to reduce heart failure hospitalization and that is not an issue with her. Her potassium was only 3.5 at [facility] and she did improve with magnesium supplementation so I will also start her on potassium supplementation. I will see her back in 3 months but she is scheduled to see Dr. Laly Patel later this month.

## 2022-07-22 NOTE — PROGRESS NOTES
Cardiac Electrophysiology OFFICE Note     Subjective:      Tonya Gilford is a 39 y.o. patient who presents for follow up, is s/p [facility] John single lead ICD (DOI 01/30/2012). Postpartum dilated CM. LVEF 30-35% on our last check, but she reports echo elsewhere since then. NYHA I-II chronic systolic CHF. States has an excellent activity tolerance. On appropriate GDMT. She reports increased palpitations since COVID vaccine in 10/2021; she had SVT noted on check in 04/2022, but none on device check today. BP controlled, borderline low. Previous:  ER visit in 07/2022 for headache & chest tightness, nausea, & diaphoresis. Seen at [facility] earlier that day with reportedly negative eval.    SVT 06/25/2021, treated with inappropriate ICD shock, didn't terminate. VT x 58 seconds in 08/2021, failed ATP sequences, terminated with 36J shock. ICD shock in 06/2021, went to [facility] afterward, had Mg 1.4 & K 3.5. EP study 03/2018; no sustained SVT to map, therefore no ablation was performed. PAC and nonsustained right sided atrial flutter which was not her clinical SVT seen on ICD check. Post-procedure, she had slight bleeding from the RFV site when she first got out of bed, but was easily controlled with manual compression & did not recur. S/p St. John single lead ICD (DOI 01/30/2012). Diagnosed with postpartum cardiomyopathy in 2008. Followed by Dr. Ezekiel Harrington. She had ICD lead fractured (she was not compliant and followed up regularly at the time) and lead was extracted and reimplanted by Dr. Ad Roach.     Problem List  Date Reviewed: 7/11/2022            Codes Class Noted    S/P ablation of ventricular arrhythmia ICD-10-CM: Z98.890, Z86.79  ICD-9-CM: V45.89  3/2/2018        PSVT (paroxysmal supraventricular tachycardia) (HCC) ICD-10-CM: I47.1  ICD-9-CM: 427.0  3/2/2018        Tricuspid inspiratory pansystolic murmur HCI-72-WC: I07.9  ICD-9-CM: 785.2  10/17/2013        Pulmonary hypertension ([facility] ICD-10-CM: I27.20  ICD-9-CM: 416.8  10/17/2013        Headache(784.0) ICD-10-CM: R51  ICD-9-CM: 784.0  4/17/2013        Abnormal EKG ICD-10-CM: R94.31  ICD-9-CM: 794.31  11/19/2012        Mitral regurgitation ICD-10-CM: I34.0  ICD-9-CM: 424.0  11/19/2012        BP (high blood pressure) ICD-10-CM: I10  ICD-9-CM: 401.9  7/30/2012        Single implantable cardioverter-defibrillator in situ ICD-10-CM: Z95.810  ICD-9-CM: V45.02  2/1/2012    Overview Signed 2/1/2012  7:00 AM by Jun Fernandez MD     [facility] John single lead single coil AICD implant 1/30/2012             Cardiomyopathy, dilated, nonischemic (HCC) ICD-10-CM: I42.0  ICD-9-CM: 425.4  Unknown        Morbid obesity ([facility] ICD-10-CM: E66.01  ICD-9-CM: 278.01  1/17/2012        Observed sleep apnea ICD-10-CM: G47.30  ICD-9-CM: 780.57  1/17/2012        Mitral valve disorder ICD-10-CM: I05.9  ICD-9-CM: 394.9  1/6/2012        Palpitations ICD-10-CM: R00.2  ICD-9-CM: 785.1  12/22/2011        Syncope and collapse ICD-10-CM: R55  ICD-9-CM: 780.2  12/22/2011        Cardiomyopathy, peripartum, postpartum ICD-10-CM: O90.3  ICD-9-CM: 674.54  12/22/2011        Heart murmur ICD-10-CM: R01.1  ICD-9-CM: 785.2  12/22/2011           Current Outpatient Medications   Medication Sig Dispense Refill    potassium chloride (KLOR-CON M10) 10 mEq tablet Take 1 Tablet by mouth daily. 90 Tablet 3    sacubitriL-valsartan (Entresto) 49-51 mg tab tablet Take 1 Tablet by mouth two (2) times a day. 180 Tablet 3    magnesium oxide (MAG-OX) 400 mg tablet Take 1 Tablet by mouth two (2) times a day. 180 Tablet 3    spironolactone (ALDACTONE) 25 mg tablet TAKE ONE TABLET BY MOUTH DAILY 90 Tablet 3    carvediloL (COREG) 25 mg tablet TAKE ONE TABLET BY MOUTH TWICE A DAY WITH A MEAL 180 Tablet 3    bumetanide (BUMEX) 2 mg tablet TAKE ONE TABLET BY MOUTH TWICE A DAY AS NEEDED 180 Tablet 3    MULTIVITAMIN PO Take  by mouth. Takes one po once daily. OTHER Takes iron po once daily.  Will bring in strength.      b complex vitamins Liqd Take  by mouth daily. Farxiga 10 mg tab tablet  (Patient not taking: Reported on 7/26/2022)       Allergies   Allergen Reactions    Bee Sting [Sting, Bee] Anaphylaxis    Bee Venom Protein (Honey Bee) Swelling    Activase [Alteplase] Swelling     Facial swelling      Lisinopril Cough     Past Medical History:   Diagnosis Date    Arrhythmia     BRADYCARDIA    Cardiomyopathy, dilated, nonischemic (HCC)     Congestive heart failure, unspecified     Essential hypertension     Heart failure ([address]     Hypertension     ICD (implantable cardiac defibrillator) in place     St John dual coil, single lead    Morbid obesity ([address]     Murmur     Syncope     Syncope and collapse 12/22/2011    TIA (transient ischemic attack)     Valvular heart disease      Past Surgical History:   Procedure Laterality Date    COLONOSCOPY N/A 3/21/2018    COLONOSCOPY performed by Grabiel Bass MD at [facility] ENDOSCOPY    [address]  2008    HX CHOLECYSTECTOMY  2010    HX GASTRIC BYPASS      HX PACEMAKER      DEFIBRILLATOR    INS PPM/ICD LED SING ONLY  1/30/2012         IN COMPRE ELECTROPHYSIOL XM W/LEFT ATRIAL PACNG/REC  3/2/2018         IN COMPRE ELECTROPHYSIOLOGIC ARRHYTHMIA INDUCTION  3/2/2018         IN STIM/PACING HEART POST IV DRUG INFU  3/2/2018         BELLO DOPPLER  2/25/2013          Family History   Problem Relation Age of Onset    Diabetes Paternal Grandmother      Social History     Tobacco Use    Smoking status: Never    Smokeless tobacco: Never   Substance Use Topics    Alcohol use: Yes     Alcohol/week: 4.0 standard drinks     Types: 4 Standard drinks or equivalent per week     Comment: socially        Review of Systems: Review of all other systems otherwise negative. Constitutional: Negative for fever, chills, weight loss, malaise/fatigue. HEENT: Negative for nosebleeds, vision changes.    Respiratory: Negative for cough, hemoptysis  Cardiovascular: Negative for chest pain, + palpitations, no orthopnea, claudication, leg swelling, syncope, and no PND. Gastrointestinal: Negative for nausea, vomiting, diarrhea, blood in stool and melena. Genitourinary: Negative for dysuria, and hematuria. Musculoskeletal: Negative for myalgias, arthralgia. Skin: Negative for rash. Heme: Does not bleed or bruise easily. Neurological: Negative for speech change and focal weakness. + migraines     Objective:     Visit Vitals  /76 (BP 1 Location: Left upper arm, BP Patient Position: Sitting, BP Cuff Size: Large adult)   Resp 18   Ht 5' 1\" (1.549 m)   Wt 241 lb 9.6 oz (109.6 kg)   BMI 45.65 kg/m²        Physical Exam:   Constitutional: Well-developed and well-nourished. No respiratory distress. Head: Normocephalic and atraumatic. Eyes: Pupils are equal, round. ENT: Hearing grossly normal.  Neck: Supple. No JVD present. Cardiovascular: Normal rate, regular rhythm. Exam reveals no gallop and no friction rub. No murmur heard. Pulmonary/Chest: Effort normal and breath sounds normal. No wheezes. Abdominal: Soft, no tenderness. Musculoskeletal: Moves extremities independently. Normal gait. Vasc/lymphatic: No edema. Neurological: Alert,oriented. Skin: Skin is warm and dry. Left chest ICD site well healed. Psychiatric: Normal mood and affect. Behavior is normal. Judgment and thought content normal.        Assessment/Plan:     Imaging/Studies:  Echo (05/05/2021): LVEF 30-35%, mild concentric LVH, grade 1 diastolic dysfunction. Mod dilated LA. Mild MR. Mild TR. Mild TN. ICD-10-CM ICD-9-CM    1. ICD (implantable cardioverter-defibrillator) in place  Z95.810 V45.02       2. Cardiomyopathy, dilated, nonischemic (HCC)  I42.0 425.4       3. Palpitations  R00.2 785.1       4. VT (ventricular tachycardia) (HCC)  I47.2 427.1       5. SVT (supraventricular tachycardia) (HCC)  I47.1 427.89       6.  Essential hypertension  I10 401.9           [facility]. John single lead ICD (DOI 01/30/2012): Device check today shows proper lead & generator function. Generator longevity estimated 3.8 mos until JEN. No pacing. No significant arrhythmia episodes. Discussed likely generator change sometime this year; will watch for alert for JEN. She confirms remote box is plugged in at her bedside. NICM: LVEF 30-35% in 05/2021. States she had echo done at [facility] since then; will request records. NYHA II chronic systolic CHF. Continue GDMT as previously prescribed. Palpitations: No sustained arrhythmia noted via device check. States palpitations are intermittent. Will check 7 day holter, which give ability for slightly longer period of monitoring while still quantifying any PVC/PAC burden. HTN: BP controlled. Continue current medication regimen as previously prescribed. VT: Noted previously, prompted ICD shock. Not noted on today's device check. Continue carvedilol as previously prescribed; BP doesn't allow for increased dose. PSVT: Prompted inappropriate ICD shock previously. Not noted on today's device check. Continue carvedilol as previously prescribed; BP doesn't allow for increased dose. Remote ICD checks q 3 months. Follow up with Dr. Huong Mendez in 1 year. Follow up with Dr. Israel Brito as previously scheduled. Future Appointments   Date Time Provider Shanae Vargas   10/28/2022  4:00 PM MD FABIOLA Siddiqui BS AMB   10/31/2022 10:30 AM REMOTE1, CARLIN HICKS BS AMB   2/6/2023  3:00 PM REMOTE1, CARLIN CAVREY BS AMB   5/10/2023  4:00 PM REMOTE1, CARLIN VINCENTREY BS AMB   8/29/2023  3:00 PM PACEMAKER3, CARLIN CAVREY BS AMB   8/29/2023  3:30 PM Juan Macario NP CAVREY BS AMB     Thank you for involving me in this patient's care and please call with further concerns or questions.     LACY Hatfield  [facility]  07/26/22

## 2022-07-26 ENCOUNTER — OFFICE VISIT (OUTPATIENT)
Dept: CARDIOLOGY CLINIC | Age: 37
End: 2022-07-26
Payer: COMMERCIAL

## 2022-07-26 ENCOUNTER — CLINICAL SUPPORT (OUTPATIENT)
Dept: CARDIOLOGY CLINIC | Age: 37
End: 2022-07-26

## 2022-07-26 VITALS
BODY MASS INDEX: 45.61 KG/M2 | SYSTOLIC BLOOD PRESSURE: 100 MMHG | WEIGHT: 241.6 LBS | HEIGHT: 61 IN | RESPIRATION RATE: 18 BRPM | DIASTOLIC BLOOD PRESSURE: 76 MMHG

## 2022-07-26 DIAGNOSIS — I47.20 VT (VENTRICULAR TACHYCARDIA): ICD-10-CM

## 2022-07-26 DIAGNOSIS — I47.1 SVT (SUPRAVENTRICULAR TACHYCARDIA) (HCC): ICD-10-CM

## 2022-07-26 DIAGNOSIS — Z95.810 ICD (IMPLANTABLE CARDIOVERTER-DEFIBRILLATOR) IN PLACE: Primary | ICD-10-CM

## 2022-07-26 DIAGNOSIS — I10 ESSENTIAL HYPERTENSION: ICD-10-CM

## 2022-07-26 DIAGNOSIS — I42.0 CARDIOMYOPATHY, DILATED, NONISCHEMIC (HCC): ICD-10-CM

## 2022-07-26 DIAGNOSIS — R00.2 PALPITATIONS: ICD-10-CM

## 2022-07-26 DIAGNOSIS — Z95.810 AUTOMATIC IMPLANTABLE CARDIAC DEFIBRILLATOR IN SITU: Primary | ICD-10-CM

## 2022-07-26 PROCEDURE — 93289 INTERROG DEVICE EVAL HEART: CPT | Performed by: INTERNAL MEDICINE

## 2022-07-26 PROCEDURE — 99214 OFFICE O/P EST MOD 30 MIN: CPT | Performed by: NURSE PRACTITIONER

## 2022-07-28 NOTE — PROGRESS NOTES
Chargeable vvi icd check; annual visit. Normal device function  No RV pacing. No tachy detections or therapies delivered. See scanned report in  for details.

## 2022-08-01 ENCOUNTER — DOCUMENTATION ONLY (OUTPATIENT)
Dept: CARDIOLOGY CLINIC | Age: 37
End: 2022-08-01

## 2022-08-01 NOTE — PROGRESS NOTES
Verified patient with two types of identifiers. Notified of MD recommendations. Scheduled generator change for 11/11/22 at 1:00 pm. Will send [employer] message with pre procedure instructions and H&P appointment with [place] NP. Patient verbalized understanding and will call with any other questions.       Future Appointments   Date Time Provider Shanae Vargas   10/25/2022  3:30 PM ALMA Ramirez AMB   10/28/2022  4:00 PM MD FABIOLA Freed AMB   10/31/2022 10:30 AM REMOTE1, CARLIN HICKS BS AMB   11/18/2022  9:30 AM WOUND CHECKS, CARLIN HICKS BS AMB   11/18/2022 10:00 AM PACEMAKER3, CARLIN HICKS BS AMB   2/6/2023  3:00 PM REMOTE1, CARLIN HICKS BS AMB   5/10/2023  4:00 PM REMOTE1, CARLIN HICKS BS AMB   8/29/2023  3:00 PM PACEMAKER3, CARLIN HICKS BS AMB   8/29/2023  3:30 PM ALMA Ramirez AMB

## 2022-08-25 ENCOUNTER — PATIENT MESSAGE (OUTPATIENT)
Dept: CARDIOLOGY CLINIC | Age: 37
End: 2022-08-25

## 2022-08-31 ENCOUNTER — DOCUMENTATION ONLY (OUTPATIENT)
Dept: CARDIOLOGY CLINIC | Age: 37
End: 2022-08-31

## 2022-09-17 ENCOUNTER — PATIENT MESSAGE (OUTPATIENT)
Dept: CARDIOLOGY CLINIC | Age: 37
End: 2022-09-17

## 2022-09-19 ENCOUNTER — OFFICE VISIT (OUTPATIENT)
Dept: CARDIOLOGY CLINIC | Age: 37
End: 2022-09-19

## 2022-09-19 DIAGNOSIS — Z95.810 AUTOMATIC IMPLANTABLE CARDIAC DEFIBRILLATOR IN SITU: Primary | ICD-10-CM

## 2022-09-19 NOTE — TELEPHONE ENCOUNTER
Patient ID'ed using two identifiers. Patient was uncomfortable with the JEN vibrations, had a fear a shock was about to occur. Having her come into the clinic this afternoon to turn off the vibration alert.

## 2022-09-19 NOTE — PROGRESS NOTES
No charge Add- on ICD check. (ABT). Device functioning appropriately as programmed. Pt. Came to office to have JEN alert turned off. See scanned documents in media manger.

## 2022-10-21 NOTE — PROGRESS NOTES
Cardiac Electrophysiology OFFICE Note     Subjective:      Erika Navarro is a 44 y.o. patient who presents for upcoming planned ICD generator change (scheduled for 11/11/2022), is s/p [employer] single lead ICD (DOI 01/30/2012). Device reached JEN in 09/2022. Postpartum dilated CM. LVEF 30-35% on our last check, but she reports echo elsewhere since then. NYHA I-II chronic systolic CHF. States has an excellent activity tolerance. On appropriate GDMT. She reported increased palpitations since COVID vaccine in 10/2021; she had SVT noted on most recent device check on 08/12/2022. BP controlled, borderline low. Previous:  ER visit in 07/2022 for headache & chest tightness, nausea, & diaphoresis. Seen at [facility] earlier that day with reportedly negative eval.    SVT 06/25/2021, treated with inappropriate ICD shock, didn't terminate. VT x 58 seconds in 08/2021, failed ATP sequences, terminated with 36J shock. ICD shock in 06/2021, went to [facility] afterward, had Mg 1.4 & K 3.5. EP study 03/2018; no sustained SVT to map, therefore no ablation was performed. PAC and nonsustained right sided atrial flutter which was not her clinical SVT seen on ICD check. Post-procedure, she had slight bleeding from the RFV site when she first got out of bed, but was easily controlled with manual compression & did not recur. S/p [employer] single lead ICD (DOI 01/30/2012). Diagnosed with postpartum cardiomyopathy in 2008. Followed by Dr. Darinel Canela. She had ICD lead fractured (she was not compliant and followed up regularly at the time) and lead was extracted and reimplanted by Dr. Cristine Meneses.       Problem List  Date Reviewed: 10/24/2022            Codes Class Noted    S/P ablation of ventricular arrhythmia ICD-10-CM: Z98.890, Z86.79  ICD-9-CM: V45.89  3/2/2018        PSVT (paroxysmal supraventricular tachycardia) ([address] ICD-10-CM: I47.1  ICD-9-CM: 427.0  3/2/2018        Tricuspid inspiratory pansystolic murmur TAC-83-ZF: I07.9  ICD-9-CM: 785.2  10/17/2013        Pulmonary hypertension ([facility] [address] ICD-10-CM: I27.20  ICD-9-CM: 416.8  10/17/2013        Headache(784.0) ICD-10-CM: R51  ICD-9-CM: 784.0  4/17/2013        Abnormal EKG ICD-10-CM: R94.31  ICD-9-CM: 794.31  11/19/2012        Mitral regurgitation ICD-10-CM: I34.0  ICD-9-CM: 424.0  11/19/2012        BP (high blood pressure) ICD-10-CM: I10  ICD-9-CM: 401.9  7/30/2012        Single implantable cardioverter-defibrillator in situ ICD-10-CM: Z95.810  ICD-9-CM: V45.02  2/1/2012    Overview Signed 2/1/2012  7:00 AM by Diane Javier MD     [employer] [facility] single lead single coil AICD implant 1/30/2012             Cardiomyopathy, dilated, nonischemic (HCC) ICD-10-CM: I42.0  ICD-9-CM: 425.4  Unknown        Morbid obesity ([facility] [address] ICD-10-CM: E66.01  ICD-9-CM: 278.01  1/17/2012        Observed sleep apnea ICD-10-CM: G47.30  ICD-9-CM: 780.57  1/17/2012        Mitral valve disorder ICD-10-CM: I05.9  ICD-9-CM: 394.9  1/6/2012        Palpitations ICD-10-CM: R00.2  ICD-9-CM: 785.1  12/22/2011        Syncope and collapse ICD-10-CM: R55  ICD-9-CM: 780.2  12/22/2011        Cardiomyopathy, peripartum, postpartum ICD-10-CM: O90.3  ICD-9-CM: 674.54  12/22/2011        Heart murmur ICD-10-CM: R01.1  ICD-9-CM: 785.2  12/22/2011           Current Outpatient Medications   Medication Sig Dispense Refill    chlorhexidine (Hibiclens) 4 % liquid Apply to the upper chest area from shoulder/neck to mid line of chest and to below the nipple every day, 5 days prior to the procedure. 1 Each 0    potassium chloride (KLOR-CON M10) 10 mEq tablet Take 1 Tablet by mouth daily. 90 Tablet 3    sacubitriL-valsartan (Entresto) 49-51 mg tab tablet Take 1 Tablet by mouth two (2) times a day. 180 Tablet 3    magnesium oxide (MAG-OX) 400 mg tablet Take 1 Tablet by mouth two (2) times a day.  180 Tablet 3    spironolactone (ALDACTONE) 25 mg tablet TAKE ONE TABLET BY MOUTH DAILY 90 Tablet 3    carvediloL (COREG) 25 mg tablet TAKE ONE TABLET BY MOUTH TWICE A DAY WITH A MEAL 180 Tablet 3    bumetanide (BUMEX) 2 mg tablet TAKE ONE TABLET BY MOUTH TWICE A DAY AS NEEDED 180 Tablet 3    MULTIVITAMIN PO Take  by mouth. Takes one po once daily. OTHER Takes iron po once daily. Will bring in strength.      b complex vitamins Liqd Take  by mouth daily. Allergies   Allergen Reactions    Bee Sting [Sting, Bee] Anaphylaxis    Bee Venom Protein (Honey Bee) Swelling    Activase [Alteplase] Swelling     Facial swelling      Lisinopril Cough     Past Medical History:   Diagnosis Date    Arrhythmia     BRADYCARDIA    Cardiomyopathy, dilated, nonischemic (HCC)     Congestive heart failure, unspecified     Essential hypertension     Heart failure ([address]     Hypertension     ICD (implantable cardiac defibrillator) in place     [facility] John dual coil, single lead    Morbid obesity ([facility] [address]     Murmur     Syncope     Syncope and collapse 2011    TIA (transient ischemic attack)     Valvular heart disease      Past Surgical History:   Procedure Laterality Date    COLONOSCOPY N/A 3/21/2018    COLONOSCOPY performed by Naren Chambers MD at [facility] ENDOSCOPY    HX  SECTION      HX CHOLECYSTECTOMY      HX GASTRIC BYPASS      HX PACEMAKER      DEFIBRILLATOR    INS PPM/ICD LED SING ONLY  2012         [place] COMPRE ELECTROPHYSIOL XM W/LEFT ATRIAL PACNG/REC  3/2/2018         [place] COMPRE ELECTROPHYSIOLOGIC ARRHYTHMIA INDUCTION  3/2/2018         [place] STIM/PACING HEART POST IV DRUG INFU  3/2/2018         BELLO DOPPLER  2013          Family History   Problem Relation Age of Onset    Diabetes Paternal Grandmother      Social History     Tobacco Use    Smoking status: Never    Smokeless tobacco: Never   Substance Use Topics    Alcohol use: Yes     Alcohol/week: 4.0 standard drinks     Types: 4 Standard drinks or equivalent per week     Comment: socially        Review of Systems: Review of all other systems otherwise negative.   Constitutional: Negative for fever, chills, weight loss, malaise/fatigue. HEENT: Negative for nosebleeds, vision changes. Respiratory: Negative for cough, hemoptysis. Cardiovascular: Negative for chest pain, + palpitations, no orthopnea, claudication, leg swelling, syncope, and no PND. Gastrointestinal: Negative for nausea, vomiting, diarrhea, blood in stool and melena. Genitourinary: Negative for dysuria, and hematuria. Musculoskeletal: Negative for myalgias, arthralgia. Skin: Negative for rash. Heme: Does not bleed or bruise easily. Neurological: Negative for speech change and focal weakness. + migraines     Objective:     Visit Vitals  /80 (BP 1 Location: Right arm, BP Patient Position: Sitting, BP Cuff Size: Adult long)   Pulse 64   Resp 18   Ht 5' 1\" (1.549 m)   Wt 242 lb (109.8 kg)   BMI 45.73 kg/m²        Physical Exam:   Constitutional: Well-developed and well-nourished. No respiratory distress. Head: Normocephalic and atraumatic. Eyes: Pupils are equal, round. ENT: Hearing grossly normal.  Neck: Supple. No JVD present. Cardiovascular: Normal rate, regular rhythm. Exam reveals no gallop and no friction rub. No murmur heard. Pulmonary/Chest: Effort normal and breath sounds normal. No wheezes. Abdominal: Soft, no tenderness. Obese. Musculoskeletal: Moves extremities independently. Normal gait. Vasc/lymphatic: No edema. Neurological: Alert, oriented. Skin: Skin is warm and dry. Left chest ICD site well healed. Psychiatric: Normal mood and affect. Behavior is normal. Judgment and thought content normal.        Assessment/Plan:     Imaging/Studies:  Holter (01/2022): HR  bpm, avg 74 bpm.  PAC <0.01%, PVC 0.04%. NSR. Echo (05/05/2021): LVEF 30-35%, mild concentric LVH, grade 1 diastolic dysfunction. Mod dilated LA. Mild MR. Mild TR. Mild MN. ICD-10-CM ICD-9-CM    1.  Automatic implantable cardiac defibrillator in situ  Z95.810 V45.02 CBC WITH AUTOMATED DIFF METABOLIC PANEL, BASIC      2. Essential hypertension  I10 401.9 CBC WITH AUTOMATED DIFF      METABOLIC PANEL, BASIC      3. S/P ablation of ventricular arrhythmia  Z98.890 V45.89 CBC WITH AUTOMATED DIFF    R38.00  METABOLIC PANEL, BASIC      4. PSVT (paroxysmal supraventricular tachycardia) ([facility])  I47.1 427.0 CBC WITH AUTOMATED DIFF      METABOLIC PANEL, BASIC      5. NICM (nonischemic cardiomyopathy) ([facility])  I42.8 425.4 CBC WITH AUTOMATED DIFF      METABOLIC PANEL, BASIC      6. Systolic CHF, chronic ([facility])  I50.22 428.22 CBC WITH AUTOMATED DIFF     427.7 METABOLIC PANEL, BASIC          [employer] single lead ICD (DOI 01/30/2012): Device check on 08/12/2022 showed generator longevity estimated 3.8 mos until JEN; reached JEN in 09/2022. No pacing. PSVT x 4 episodes on 08/07/2022. Reviewed risks/benefits of single lead ICD generator change. She agrees to proceed as scheduled. Labs ordered. Hibiclens reviewed. NICM: LVEF 30-35% in 05/2021. States she had echo done at [facility] since then; will request records. NYHA II chronic systolic CHF. Continue GDMT as previously prescribed. Palpitations: Brief PSVT noted on most recent device check. States palpitations are intermittent. Continues carvedilol. HTN: BP controlled. Continue current medication regimen as previously prescribed. VT: Noted previously, prompted ICD shock. Not noted on today's device check. Continue carvedilol as previously prescribed; BP doesn't allow for increased dose. PSVT: Prompted inappropriate ICD shock previously, was noted on last device check on 08/12/2022 as well. Continue carvedilol as previously prescribed; BP doesn't allow for increased dose.       Future Appointments   Date Time Provider Shanae Vargas   10/28/2022  4:00 PM MD FABIOLA Mo BS AMB   10/31/2022 10:30 AM REMOTE1, CARLIN RAIN AMB   11/18/2022  9:30 AM WOUND CHECKS, CARLIN RAIN AMB   11/18/2022 10:00 AM PACEMAKER3, CARLIN FABIOLA BS AMB   2/6/2023  3:00 PM REMOTE1, CARLIN HICKS BS AMB   5/10/2023  4:00 PM REMOTE1, CARLIN HICKS BS AMB   8/29/2023  3:00 PM PACEMAKER3, CARLIN HICKS BS AMB   8/29/2023  3:30 PM ALMA Nguyen BS AMB     Thank you for involving me in this patient's care and please call with further concerns or questions.     LACY Akers  [facility] [place]  10/24/22

## 2022-10-21 NOTE — H&P (VIEW-ONLY)
Cardiac Electrophysiology OFFICE Note     Subjective:      Nadeem Hogue is a 44 y.o. patient who presents for upcoming planned ICD generator change (scheduled for 11/11/2022), is s/p [employer] [facility] single lead ICD (DOI 01/30/2012). Device reached JEN in 09/2022. Postpartum dilated CM. LVEF 30-35% on our last check, but she reports echo elsewhere since then. NYHA I-II chronic systolic CHF. States has an excellent activity tolerance. On appropriate GDMT. She reported increased palpitations since COVID vaccine in 10/2021; she had SVT noted on most recent device check on 08/12/2022. BP controlled, borderline low. Previous:  ER visit in 07/2022 for headache & chest tightness, nausea, & diaphoresis. Seen at [facility] earlier that day with reportedly negative eval.    SVT 06/25/2021, treated with inappropriate ICD shock, didn't terminate. VT x 58 seconds in 08/2021, failed ATP sequences, terminated with 36J shock. ICD shock in 06/2021, went to [facility] afterward, had Mg 1.4 & K 3.5. EP study 03/2018; no sustained SVT to map, therefore no ablation was performed. PAC and nonsustained right sided atrial flutter which was not her clinical SVT seen on ICD check. Post-procedure, she had slight bleeding from the RFV site when she first got out of bed, but was easily controlled with manual compression & did not recur. S/p [employer] [facility] single lead ICD (DOI 01/30/2012). Diagnosed with postpartum cardiomyopathy in 2008. Followed by Dr. Opal Marie. She had ICD lead fractured (she was not compliant and followed up regularly at the time) and lead was extracted and reimplanted by Dr. Karishma Meyers.       Problem List  Date Reviewed: 10/24/2022            Codes Class Noted    S/P ablation of ventricular arrhythmia ICD-10-CM: Z98.890, Z86.79  ICD-9-CM: V45.89  3/2/2018        PSVT (paroxysmal supraventricular tachycardia) ([address] ICD-10-CM: I47.1  ICD-9-CM: 427.0  3/2/2018        Tricuspid inspiratory pansystolic murmur PLM-48-OY: I07.9  ICD-9-CM: 785.2  10/17/2013        Pulmonary hypertension ([facility] [address] ICD-10-CM: I27.20  ICD-9-CM: 416.8  10/17/2013        Headache(784.0) ICD-10-CM: R51  ICD-9-CM: 784.0  4/17/2013        Abnormal EKG ICD-10-CM: R94.31  ICD-9-CM: 794.31  11/19/2012        Mitral regurgitation ICD-10-CM: I34.0  ICD-9-CM: 424.0  11/19/2012        BP (high blood pressure) ICD-10-CM: I10  ICD-9-CM: 401.9  7/30/2012        Single implantable cardioverter-defibrillator in situ ICD-10-CM: Z95.810  ICD-9-CM: V45.02  2/1/2012    Overview Signed 2/1/2012  7:00 AM by Libby Cabrera MD     [facility] single lead single coil AICD implant 1/30/2012             Cardiomyopathy, dilated, nonischemic (HCC) ICD-10-CM: I42.0  ICD-9-CM: 425.4  Unknown        Morbid obesity ([facility] [address] ICD-10-CM: E66.01  ICD-9-CM: 278.01  1/17/2012        Observed sleep apnea ICD-10-CM: G47.30  ICD-9-CM: 780.57  1/17/2012        Mitral valve disorder ICD-10-CM: I05.9  ICD-9-CM: 394.9  1/6/2012        Palpitations ICD-10-CM: R00.2  ICD-9-CM: 785.1  12/22/2011        Syncope and collapse ICD-10-CM: R55  ICD-9-CM: 780.2  12/22/2011        Cardiomyopathy, peripartum, postpartum ICD-10-CM: O90.3  ICD-9-CM: 674.54  12/22/2011        Heart murmur ICD-10-CM: R01.1  ICD-9-CM: 785.2  12/22/2011           Current Outpatient Medications   Medication Sig Dispense Refill    chlorhexidine (Hibiclens) 4 % liquid Apply to the upper chest area from shoulder/neck to mid line of chest and to below the nipple every day, 5 days prior to the procedure. 1 Each 0    potassium chloride (KLOR-CON M10) 10 mEq tablet Take 1 Tablet by mouth daily. 90 Tablet 3    sacubitriL-valsartan (Entresto) 49-51 mg tab tablet Take 1 Tablet by mouth two (2) times a day. 180 Tablet 3    magnesium oxide (MAG-OX) 400 mg tablet Take 1 Tablet by mouth two (2) times a day.  180 Tablet 3    spironolactone (ALDACTONE) 25 mg tablet TAKE ONE TABLET BY MOUTH DAILY 90 Tablet 3    carvediloL (COREG) 25 mg tablet TAKE ONE TABLET BY MOUTH TWICE A DAY WITH A MEAL 180 Tablet 3    bumetanide (BUMEX) 2 mg tablet TAKE ONE TABLET BY MOUTH TWICE A DAY AS NEEDED 180 Tablet 3    MULTIVITAMIN PO Take  by mouth. Takes one po once daily. OTHER Takes iron po once daily. Will bring in strength.      b complex vitamins Liqd Take  by mouth daily. Allergies   Allergen Reactions    Bee Sting [Sting, Bee] Anaphylaxis    Bee Venom Protein (Honey Bee) Swelling    Activase [Alteplase] Swelling     Facial swelling      Lisinopril Cough     Past Medical History:   Diagnosis Date    Arrhythmia     BRADYCARDIA    Cardiomyopathy, dilated, nonischemic (HCC)     Congestive heart failure, unspecified     Essential hypertension     Heart failure ([address]     Hypertension     ICD (implantable cardiac defibrillator) in place     [facility] dual coil, single lead    Morbid obesity ([facility] [address]     Murmur     Syncope     Syncope and collapse 2011    TIA (transient ischemic attack)     Valvular heart disease      Past Surgical History:   Procedure Laterality Date    COLONOSCOPY N/A 3/21/2018    COLONOSCOPY performed by Cecy Anderson MD at [facility] ENDOSCOPY    HX  SECTION      HX CHOLECYSTECTOMY      HX GASTRIC BYPASS      HX PACEMAKER      DEFIBRILLATOR    INS PPM/ICD LED SING ONLY  2012         [place] COMPRE ELECTROPHYSIOL XM W/LEFT ATRIAL PACNG/REC  3/2/2018         VA COMPRE ELECTROPHYSIOLOGIC ARRHYTHMIA INDUCTION  3/2/2018         [place] STIM/PACING HEART POST IV DRUG INFU  3/2/2018         BELLO DOPPLER  2013          Family History   Problem Relation Age of Onset    Diabetes Paternal Grandmother      Social History     Tobacco Use    Smoking status: Never    Smokeless tobacco: Never   Substance Use Topics    Alcohol use: Yes     Alcohol/week: 4.0 standard drinks     Types: 4 Standard drinks or equivalent per week     Comment: socially        Review of Systems: Review of all other systems otherwise negative.   Constitutional: Negative for fever, chills, weight loss, malaise/fatigue. HEENT: Negative for nosebleeds, vision changes. Respiratory: Negative for cough, hemoptysis. Cardiovascular: Negative for chest pain, + palpitations, no orthopnea, claudication, leg swelling, syncope, and no PND. Gastrointestinal: Negative for nausea, vomiting, diarrhea, blood in stool and melena. Genitourinary: Negative for dysuria, and hematuria. Musculoskeletal: Negative for myalgias, arthralgia. Skin: Negative for rash. Heme: Does not bleed or bruise easily. Neurological: Negative for speech change and focal weakness. + migraines     Objective:     Visit Vitals  /80 (BP 1 Location: Right arm, BP Patient Position: Sitting, BP Cuff Size: Adult long)   Pulse 64   Resp 18   Ht 5' 1\" (1.549 m)   Wt 242 lb (109.8 kg)   BMI 45.73 kg/m²        Physical Exam:   Constitutional: Well-developed and well-nourished. No respiratory distress. Head: Normocephalic and atraumatic. Eyes: Pupils are equal, round. ENT: Hearing grossly normal.  Neck: Supple. No JVD present. Cardiovascular: Normal rate, regular rhythm. Exam reveals no gallop and no friction rub. No murmur heard. Pulmonary/Chest: Effort normal and breath sounds normal. No wheezes. Abdominal: Soft, no tenderness. Obese. Musculoskeletal: Moves extremities independently. Normal gait. Vasc/lymphatic: No edema. Neurological: Alert, oriented. Skin: Skin is warm and dry. Left chest ICD site well healed. Psychiatric: Normal mood and affect. Behavior is normal. Judgment and thought content normal.        Assessment/Plan:     Imaging/Studies:  Holter (01/2022): HR  bpm, avg 74 bpm.  PAC <0.01%, PVC 0.04%. NSR. Echo (05/05/2021): LVEF 30-35%, mild concentric LVH, grade 1 diastolic dysfunction. Mod dilated LA. Mild MR. Mild TR. Mild OH. ICD-10-CM ICD-9-CM    1.  Automatic implantable cardiac defibrillator in situ  Z95.810 V45.02 CBC WITH AUTOMATED DIFF METABOLIC PANEL, BASIC      2. Essential hypertension  I10 401.9 CBC WITH AUTOMATED DIFF      METABOLIC PANEL, BASIC      3. S/P ablation of ventricular arrhythmia  Z98.890 V45.89 CBC WITH AUTOMATED DIFF    W05.24  METABOLIC PANEL, BASIC      4. PSVT (paroxysmal supraventricular tachycardia) ([facility])  I47.1 427.0 CBC WITH AUTOMATED DIFF      METABOLIC PANEL, BASIC      5. NICM (nonischemic cardiomyopathy) ([facility])  I42.8 425.4 CBC WITH AUTOMATED DIFF      METABOLIC PANEL, BASIC      6. Systolic CHF, chronic ([facility])  I50.22 428.22 CBC WITH AUTOMATED DIFF     814.9 METABOLIC PANEL, BASIC          [employer] [facility] single lead ICD (DOI 01/30/2012): Device check on 08/12/2022 showed generator longevity estimated 3.8 mos until JEN; reached JEN in 09/2022. No pacing. PSVT x 4 episodes on 08/07/2022. Reviewed risks/benefits of single lead ICD generator change. She agrees to proceed as scheduled. Labs ordered. Hibiclens reviewed. NICM: LVEF 30-35% in 05/2021. States she had echo done at [facility] since then; will request records. NYHA II chronic systolic CHF. Continue GDMT as previously prescribed. Palpitations: Brief PSVT noted on most recent device check. States palpitations are intermittent. Continues carvedilol. HTN: BP controlled. Continue current medication regimen as previously prescribed. VT: Noted previously, prompted ICD shock. Not noted on today's device check. Continue carvedilol as previously prescribed; BP doesn't allow for increased dose. PSVT: Prompted inappropriate ICD shock previously, was noted on last device check on 08/12/2022 as well. Continue carvedilol as previously prescribed; BP doesn't allow for increased dose.       Future Appointments   Date Time Provider Shanae Vargas   10/28/2022  4:00 PM MD FABIOLA Miranda BS AMB   10/31/2022 10:30 AM REMOTE1, CARLIN RAIN AMB   11/18/2022  9:30 AM WOUND CHECKS, ACRLIN RAIN AMB   11/18/2022 10:00 AM PACEMAKER3, CARLIN FABIOLA BS AMB   2/6/2023  3:00 PM REMOTE1, CARLIN HICKS BS AMB   5/10/2023  4:00 PM REMOTE1, CARLIN HICKS BS AMB   8/29/2023  3:00 PM PACEMAKER3, CARLIN HICKS BS AMB   8/29/2023  3:30 PM Chary Olivarez, ALMA HICKS BS AMB     Thank you for involving me in this patient's care and please call with further concerns or questions.     LACY Patrick  [facility] [place]  10/24/22

## 2022-10-24 ENCOUNTER — OFFICE VISIT (OUTPATIENT)
Dept: CARDIOLOGY CLINIC | Age: 37
End: 2022-10-24
Payer: COMMERCIAL

## 2022-10-24 VITALS
HEIGHT: 61 IN | SYSTOLIC BLOOD PRESSURE: 104 MMHG | BODY MASS INDEX: 45.69 KG/M2 | DIASTOLIC BLOOD PRESSURE: 80 MMHG | HEART RATE: 64 BPM | WEIGHT: 242 LBS | RESPIRATION RATE: 18 BRPM

## 2022-10-24 DIAGNOSIS — Z95.810 AUTOMATIC IMPLANTABLE CARDIAC DEFIBRILLATOR IN SITU: Primary | ICD-10-CM

## 2022-10-24 DIAGNOSIS — Z98.890 S/P ABLATION OF VENTRICULAR ARRHYTHMIA: ICD-10-CM

## 2022-10-24 DIAGNOSIS — I10 ESSENTIAL HYPERTENSION: ICD-10-CM

## 2022-10-24 DIAGNOSIS — I47.1 PSVT (PAROXYSMAL SUPRAVENTRICULAR TACHYCARDIA) (HCC): ICD-10-CM

## 2022-10-24 DIAGNOSIS — I42.8 NICM (NONISCHEMIC CARDIOMYOPATHY) (HCC): ICD-10-CM

## 2022-10-24 DIAGNOSIS — I50.22 SYSTOLIC CHF, CHRONIC (HCC): ICD-10-CM

## 2022-10-24 DIAGNOSIS — Z86.79 S/P ABLATION OF VENTRICULAR ARRHYTHMIA: ICD-10-CM

## 2022-10-24 PROCEDURE — 99214 OFFICE O/P EST MOD 30 MIN: CPT | Performed by: NURSE PRACTITIONER

## 2022-10-24 RX ORDER — CHLORHEXIDINE GLUCONATE 4 G/100ML
SOLUTION TOPICAL
Qty: 1 EACH | Refills: 0 | Status: SHIPPED
Start: 2022-10-24 | End: 2022-11-11

## 2022-10-24 RX ORDER — VALACYCLOVIR HYDROCHLORIDE 1 G/1
TABLET, FILM COATED ORAL AS NEEDED
COMMUNITY
Start: 2022-08-13 | End: 2022-10-24

## 2022-10-28 ENCOUNTER — OFFICE VISIT (OUTPATIENT)
Dept: CARDIOLOGY CLINIC | Age: 37
End: 2022-10-28
Payer: COMMERCIAL

## 2022-10-28 ENCOUNTER — TELEPHONE (OUTPATIENT)
Dept: CARDIOLOGY CLINIC | Age: 37
End: 2022-10-28

## 2022-10-28 VITALS
WEIGHT: 242 LBS | DIASTOLIC BLOOD PRESSURE: 80 MMHG | HEIGHT: 61 IN | RESPIRATION RATE: 18 BRPM | HEART RATE: 72 BPM | SYSTOLIC BLOOD PRESSURE: 110 MMHG | BODY MASS INDEX: 45.69 KG/M2

## 2022-10-28 DIAGNOSIS — I07.9: ICD-10-CM

## 2022-10-28 DIAGNOSIS — Z95.810 AUTOMATIC IMPLANTABLE CARDIAC DEFIBRILLATOR IN SITU: ICD-10-CM

## 2022-10-28 DIAGNOSIS — I47.20 VT (VENTRICULAR TACHYCARDIA): ICD-10-CM

## 2022-10-28 DIAGNOSIS — I42.0 CARDIOMYOPATHY, DILATED, NONISCHEMIC (HCC): ICD-10-CM

## 2022-10-28 DIAGNOSIS — I42.0 CARDIOMYOPATHY, DILATED, NONISCHEMIC (HCC): Primary | ICD-10-CM

## 2022-10-28 DIAGNOSIS — I42.8 NICM (NONISCHEMIC CARDIOMYOPATHY) (HCC): Primary | ICD-10-CM

## 2022-10-28 DIAGNOSIS — I47.1 PSVT (PAROXYSMAL SUPRAVENTRICULAR TACHYCARDIA) (HCC): ICD-10-CM

## 2022-10-28 DIAGNOSIS — I50.22 SYSTOLIC CHF, CHRONIC (HCC): ICD-10-CM

## 2022-10-28 DIAGNOSIS — Z86.79 S/P ABLATION OF VENTRICULAR ARRHYTHMIA: ICD-10-CM

## 2022-10-28 DIAGNOSIS — I10 ESSENTIAL HYPERTENSION: ICD-10-CM

## 2022-10-28 DIAGNOSIS — Z98.890 S/P ABLATION OF VENTRICULAR ARRHYTHMIA: ICD-10-CM

## 2022-10-28 DIAGNOSIS — R00.2 PALPITATION: ICD-10-CM

## 2022-10-28 PROCEDURE — 3078F DIAST BP <80 MM HG: CPT | Performed by: SPECIALIST

## 2022-10-28 PROCEDURE — 3074F SYST BP LT 130 MM HG: CPT | Performed by: SPECIALIST

## 2022-10-28 PROCEDURE — 99214 OFFICE O/P EST MOD 30 MIN: CPT | Performed by: SPECIALIST

## 2022-10-28 NOTE — PROGRESS NOTES
HISTORY OF PRESENT ILLNESS  Rico Hamilton is a 44 y.o. female. She has a dilated nonischemic cardiomyopathy which started 3 years after delivery of her only child. She has a defibrillator and has had ablation of ventricular and supraventricular arrhythmias. She is scheduled to have a generator change for her defibrillator in 2 weeks. She had an episode earlier this year when she had chest pain and went into [facility] and troponin was elevated. She had a severe reaction in the past to the coronavirus vaccine and has been given a letter that she should not take the vaccine in any form again. She had normal coronary arteries 10 years ago by catheterization in the setting of ejection fraction to 15%. In the past she had a loud heart murmur due to tricuspid insufficiency but the murmur has not been her increased. Her last echocardiogram was done in May of last year that showed ejection fraction in the range of 35%. HPI  Patient Active Problem List   Diagnosis Code    Palpitations R00.2    Syncope and collapse R55    Cardiomyopathy, peripartum, postpartum O90.3    Heart murmur R01.1    Mitral valve disorder I05.9    Morbid obesity ([facility]) E66.01    Observed sleep apnea G47.30    Cardiomyopathy, dilated, nonischemic ([facility]) I42.0    Single implantable cardioverter-defibrillator in situ Z95.810    BP (high blood pressure) I10    Abnormal EKG R94.31    Mitral regurgitation I34.0    Headache(784.0) R51    Tricuspid inspiratory pansystolic murmur N16.4    Pulmonary hypertension ([facility]) I27.20    PSVT (paroxysmal supraventricular tachycardia) ([facility]) I47.1    S/P ablation of ventricular arrhythmia Z98.890, Z86.79     Current Outpatient Medications   Medication Sig Dispense Refill    chlorhexidine (Hibiclens) 4 % liquid Apply to the upper chest area from shoulder/neck to mid line of chest and to below the nipple every day, 5 days prior to the procedure.  1 Each 0    potassium chloride (KLOR-CON M10) 10 mEq tablet Take 1 Tablet by mouth daily. 90 Tablet 3    sacubitriL-valsartan (Entresto) 49-51 mg tab tablet Take 1 Tablet by mouth two (2) times a day. 180 Tablet 3    magnesium oxide (MAG-OX) 400 mg tablet Take 1 Tablet by mouth two (2) times a day. 180 Tablet 3    spironolactone (ALDACTONE) 25 mg tablet TAKE ONE TABLET BY MOUTH DAILY 90 Tablet 3    carvediloL (COREG) 25 mg tablet TAKE ONE TABLET BY MOUTH TWICE A DAY WITH A MEAL 180 Tablet 3    bumetanide (BUMEX) 2 mg tablet TAKE ONE TABLET BY MOUTH TWICE A DAY AS NEEDED 180 Tablet 3    MULTIVITAMIN PO Take  by mouth. Takes one po once daily. OTHER Takes iron po once daily. Will bring in strength.      b complex vitamins Liqd Take  by mouth daily. Past Medical History:   Diagnosis Date    Arrhythmia     BRADYCARDIA    Cardiomyopathy, dilated, nonischemic (HCC)     Congestive heart failure, unspecified     Essential hypertension     Heart failure ([address]     Hypertension     ICD (implantable cardiac defibrillator) in place     [facility] John dual coil, single lead    Morbid obesity ([address]     Murmur     Syncope     Syncope and collapse 12/22/2011    TIA (transient ischemic attack)     Valvular heart disease      Past Surgical History:   Procedure Laterality Date    COLONOSCOPY N/A 3/21/2018    COLONOSCOPY performed by Mitchell Israel MD at [address] [place] [address]  2008    HX CHOLECYSTECTOMY  2010    HX GASTRIC BYPASS      HX PACEMAKER      DEFIBRILLATOR    INS PPM/ICD LED SING ONLY  1/30/2012         MS COMPRE ELECTROPHYSIOL XM W/LEFT ATRIAL PACNG/REC  3/2/2018         MS COMPRE ELECTROPHYSIOLOGIC ARRHYTHMIA INDUCTION  3/2/2018         MS STIM/PACING HEART POST IV DRUG INFU  3/2/2018         BELLO DOPPLER  2/25/2013            Review of Systems   Cardiovascular:  Positive for palpitations. All other systems reviewed and are negative.   Visit Vitals  /80 (BP 1 Location: Left upper arm, BP Patient Position: Sitting, BP Cuff Size: Adult)   Pulse 72   Resp 18 Ht 5' 1\" (1.549 m)   Wt 242 lb (109.8 kg)   BMI 45.73 kg/m²       Physical Exam  Vitals and nursing note reviewed. Constitutional:       Appearance: She is obese. HENT:      Head: Normocephalic. Right Ear: External ear normal.      Left Ear: External ear normal.      Nose: Nose normal.      Mouth/Throat:      Mouth: Mucous membranes are moist.   Eyes:      Extraocular Movements: Extraocular movements intact. Cardiovascular:      Rate and Rhythm: Normal rate and regular rhythm. Heart sounds: Normal heart sounds. Pulmonary:      Breath sounds: Normal breath sounds. Abdominal:      Palpations: Abdomen is soft. Musculoskeletal:         General: Normal range of motion. Cervical back: Normal range of motion. Skin:     General: Skin is warm. Neurological:      Mental Status: She is alert and oriented to person, place, and time. Psychiatric:         Behavior: Behavior normal.       ASSESSMENT and PLAN  She only takes her Bumex several times a month for swelling. She is on spironolactone every day but her potassium was 3.5 when I last saw her. I also gave her prescription for potassium supplementation and her potassium when last measured recently was 4.3. With increase potassium her energy has improved and her palpitations have declined. I will continue her regimen and see her in 6 months and do another echocardiogram at that time.

## 2022-10-28 NOTE — LETTER
10/28/2022     Ms. Yeimy Bynum  [address] [facility] [address]        To Whom It May Concern:      Yeimy Bynum is currently under the care of [address]. She had a severe reaction to the first pfizer covid vaccine shot. From a cardiac standpoint, I have advised she not get the second covid shot. If there are questions or concerns please have the patient contact our office.       Sincerely,      Amee Hayes MD

## 2022-11-02 RX ORDER — SODIUM CHLORIDE 0.9 % (FLUSH) 0.9 %
5-40 SYRINGE (ML) INJECTION AS NEEDED
Status: CANCELLED | OUTPATIENT
Start: 2022-11-02

## 2022-11-02 RX ORDER — SODIUM CHLORIDE 0.9 % (FLUSH) 0.9 %
5-40 SYRINGE (ML) INJECTION EVERY 8 HOURS
Status: CANCELLED | OUTPATIENT
Start: 2022-11-02

## 2022-11-11 ENCOUNTER — HOSPITAL ENCOUNTER (OUTPATIENT)
Age: 37
Setting detail: OUTPATIENT SURGERY
Discharge: HOME | End: 2022-11-11
Attending: INTERNAL MEDICINE | Admitting: INTERNAL MEDICINE
Payer: COMMERCIAL

## 2022-11-11 VITALS
OXYGEN SATURATION: 97 % | RESPIRATION RATE: 18 BRPM | TEMPERATURE: 98.3 F | BODY MASS INDEX: 44.93 KG/M2 | HEIGHT: 61 IN | WEIGHT: 238 LBS | DIASTOLIC BLOOD PRESSURE: 73 MMHG | HEART RATE: 82 BPM | SYSTOLIC BLOOD PRESSURE: 116 MMHG

## 2022-11-11 DIAGNOSIS — E66.01 MORBID OBESITY (HCC): ICD-10-CM

## 2022-11-11 DIAGNOSIS — Z95.810 SINGLE IMPLANTABLE CARDIOVERTER-DEFIBRILLATOR IN SITU: Primary | ICD-10-CM

## 2022-11-11 DIAGNOSIS — I42.0 CARDIOMYOPATHY, DILATED, NONISCHEMIC (HCC): ICD-10-CM

## 2022-11-11 DIAGNOSIS — I42.9 CARDIOMYOPATHY, UNSPECIFIED TYPE (HCC): ICD-10-CM

## 2022-11-11 PROCEDURE — 74011000250 HC RX REV CODE- 250: Performed by: INTERNAL MEDICINE

## 2022-11-11 PROCEDURE — 33223 RELOCATE POCKET FOR DEFIB: CPT | Performed by: INTERNAL MEDICINE

## 2022-11-11 PROCEDURE — 77030041075 HC DRSG AG OPTIFRM MDII -B: Performed by: INTERNAL MEDICINE

## 2022-11-11 PROCEDURE — 77030028700 HC BLD TISS PLSM MEDT -E: Performed by: INTERNAL MEDICINE

## 2022-11-11 PROCEDURE — C1781 MESH (IMPLANTABLE): HCPCS | Performed by: INTERNAL MEDICINE

## 2022-11-11 PROCEDURE — 99153 MOD SED SAME PHYS/QHP EA: CPT | Performed by: INTERNAL MEDICINE

## 2022-11-11 PROCEDURE — 33262 RMVL& REPLC PULSE GEN 1 LEAD: CPT | Performed by: INTERNAL MEDICINE

## 2022-11-11 PROCEDURE — 74011250636 HC RX REV CODE- 250/636: Performed by: INTERNAL MEDICINE

## 2022-11-11 PROCEDURE — C1722 AICD, SINGLE CHAMBER: HCPCS

## 2022-11-11 PROCEDURE — 77030022704 HC SUT VLOC COVD -B: Performed by: INTERNAL MEDICINE

## 2022-11-11 PROCEDURE — 99152 MOD SED SAME PHYS/QHP 5/>YRS: CPT | Performed by: INTERNAL MEDICINE

## 2022-11-11 PROCEDURE — 77030032060 HC PWDR HEMSTAT ARISTA ASRB 3GM BARD -C: Performed by: INTERNAL MEDICINE

## 2022-11-11 PROCEDURE — 77030018729 HC ELECTRD DEFIB PAD CARD -B: Performed by: INTERNAL MEDICINE

## 2022-11-11 DEVICE — ENVELOPE CMRM6133 ABSORB LRG MR
Type: IMPLANTABLE DEVICE | Status: FUNCTIONAL
Brand: TYRX™

## 2022-11-11 RX ORDER — FENTANYL CITRATE 50 UG/ML
25-200 INJECTION, SOLUTION INTRAMUSCULAR; INTRAVENOUS
Status: DISCONTINUED | OUTPATIENT
Start: 2022-11-11 | End: 2022-11-11 | Stop reason: HOSPADM

## 2022-11-11 RX ORDER — MIDAZOLAM HYDROCHLORIDE 1 MG/ML
.5-1 INJECTION, SOLUTION INTRAMUSCULAR; INTRAVENOUS
Status: DISCONTINUED | OUTPATIENT
Start: 2022-11-11 | End: 2022-11-11 | Stop reason: HOSPADM

## 2022-11-11 RX ORDER — LIDOCAINE HYDROCHLORIDE 10 MG/ML
INJECTION INFILTRATION; PERINEURAL AS NEEDED
Status: DISCONTINUED | OUTPATIENT
Start: 2022-11-11 | End: 2022-11-11 | Stop reason: HOSPADM

## 2022-11-11 NOTE — ROUTINE PROCESS
TRANSFER - IN REPORT:    Verbal report received from [address] [place], [place] [address]way 24 on [facility]. , from the Cardiac Cath lab, for routine progression of care. Report consisted of patients Situation, Background, Assessment and Recommendations(SBAR). Information from the following report(s) Procedure Summary, Intake/Output, MAR, and Recent Results was reviewed with the receiving clinician. Opportunity for questions and clarification was provided. Assessment completed upon patients arrival to [address] [place] [address] and care assumed. Cardiac Cath Lab Recovery Arrival Note:     [facility]. arrived to [facility] recovery area. Patient procedure= Generator change. Patient on cardiac monitor, non-invasive blood pressure, Patient status doing well without problems. Patient is A&Ox 4. Patient reports no complaints. Procedure site without any bleeding and no hematoma.  Ice pack placed to left chest.

## 2022-11-11 NOTE — INTERVAL H&P NOTE
Update History & Physical    The Patient's History and Physical of October 24, 2022 was reviewed with the patient and I examined the patient. There was no change. The surgical site was confirmed by the patient and me. Plan:  The risk, benefits, expected outcome, and alternative to the recommended procedure have been discussed with the patient. Patient understands and wants to proceed with the procedure.     Electronically signed by Dev Damon MD on 11/11/2022 at 11:57 AM

## 2022-11-11 NOTE — PROGRESS NOTES
Cardiac Cath Lab Procedure Area Arrival Note:    Singh Fair arrived to Cardiac Cath Lab, Procedure Area. Patient identifiers verified with NAME and DATE OF BIRTH. Procedure verified with patient. Consent forms verified. Allergies verified. Patient informed of procedure and plan of care. Questions answered with review. Patient voiced understanding of procedure and plan of care. Patient on cardiac monitor, non-invasive blood pressure, SPO2 monitor. On ra. placed on O2 @ 2 lpm via nc. IV of ns on pump at 25 ml/hr. Patient status doing well without problems. Patient is A&Ox 4. Patient reports no pain. Patient medicated during procedure with orders obtained and verified by Dr. Shante Zacarias. Refer to patients Cardiac Cath Lab PROCEDURE REPORT for vital signs, assessment, status, and response during procedure, printed at end of case. Printed report on chart or scanned into chart.

## 2022-11-11 NOTE — PROGRESS NOTES
Cardiac Cath Lab Recovery Arrival Note:      Avelino Berger arrived to Cardiac Cath Lab, Recovery Area. Staff introduced to patient. Patient identifiers verified with NAME and DATE OF BIRTH. Procedure verified with patient. Consent forms reviewed and signed by patient or authorized representative and verified. Allergies verified. Patient and family oriented to department. Patient and family informed of procedure and plan of care. Questions answered with review. Patient prepped for procedure, per orders from physician, prior to arrival.    Patient on cardiac monitor, non-invasive blood pressure, SPO2 monitor. On Room Air. Patient is A&Ox 4. Patient reports No Pain. Patient in stretcher, in low position, with side rails up, call bell within reach, patient instructed to call if assistance as needed. Patient prep in: [address], [facility]. Patient family : Parents/ (Mom) [phone]  Family in: Waiting Room .    Prep by: Radha Jorgensen RN

## 2022-11-11 NOTE — DISCHARGE INSTRUCTIONS
[address] November 11, 2022       RE: Yoko Murillo      To Whom It May Concern,    Please excuse Veronica Aldana from work on 11/11/2022, she had a procedure today. Yoko Murillo may return to work on 11/16/2022. Please feel free to contact my office if you have any questions or concerns. Thank you for your assistance in this matter. Sincerely,  Conner Jaime RN                                                                                                                                                                                                 Patient Instructions Post-ICD Generator Change  1. No arm range of motion or lifting restrictions related to today's procedure. 2.  With the Aquacel dressing in place, you may shower. 3.  You may remove your Aquacel chest dressing in 1 week, or it can be removed in clinic at follow up if you prefer. 4.  Do not rub or massage your ICD site. 5.  Do not drive for 1 days. 6.  Call Dr. Henok Zuniga at [phone] if you experience any of the following symptoms:  Redness at the ICD site  Swelling at or around the ICD or in the left arm  Pain around the ICD  Dizziness, lightheadedness, fainting spells  Lack of energy  Shortness of breath  Rapid heart rate  Chest or muscle twitches    7. Call Dr. Henok Zuniga at [phone] if your ICD delivers a shock. The physician may   or may not want to see you in the office (that decision will be made when you call).     8.  Follow-up with Dr. Maryuri Alfonso office for wound check on 11/18/2022 at 9:30 AM.    Future Appointments   Date Time Provider Shanae Vargas   11/18/2022  9:30 AM WOUND CHECKS, CARLIN RAIN AMB   11/18/2022 10:00 AM PACEMAKER3, CARLIN RAIN AMB   2/6/2023  3:00 PM REMOTE1, CARLIN RAIN AMB   4/28/2023  3:00 PM ECHO, CARLIN RAIN AMB   4/28/2023  4:00 PM MD FABIOLA Dooley AMB   5/10/2023  4:00 PM REMOTE1, CARLIN RAIN AMB 8/29/2023  3:00 PM PACEMAKER3, CARLIN HICKS BS AMB   8/29/2023  3:30 PM ALMA Hernandez BS AMB     9. You may use over the counter pain medication (Tylenol) and/or ice for discomfort. Khoa Webb M.D.  [facility]  Electrophysiology/Cardiology  [address] [facility] [place]  [address], [address], [address]  [facility], [address] [place] [address] Road [address] [phone]

## 2022-11-11 NOTE — PROCEDURES
Cardiac Procedure Note   Patient: Bandar Godoy  MRN: [id number]  SSN: [id number]   YOB: 1985 Age: 44 y.o.   Sex: female    Date of Procedure: 11/11/2022   Pre-procedure Diagnosis: ICD JEN, dilated cardiomyopathy, LVEF 30%, PSVT  Post-procedure Diagnosis: same  Procedure: ICD generator change  ICD pocket revision  :  Dr. Jg Ramos MD    Assistant(s):  None  Anesthesia: Moderate Sedation   Estimated Blood Loss: Less than 10 mL   Specimens Removed St John ICD  Findings: needed high amount of lidocaine and conscious sedation  ICD pocket was extended inferiorly to accommodate new device  Complications: None   Implants: St John single chamber ICD  Signed by:  Jg Ramos MD  11/11/2022  12:15 PM

## 2022-11-11 NOTE — PROGRESS NOTES
1608:  [address]  41. ambulated @ [id number] (time) approximately 20 feet. Patient tolerated ambulation without adverse advents. Left chest incision (right/left, groin/arm)  without bleeding or hematoma noted. 1610: I have reviewed discharge instructions with the patient. The patient verbalized understanding. The patient is given a work excuse note for until 11/16/2022.  1625:  Patient wheeled out via wheelchair to ride for discharge-patient's mother.

## 2022-11-11 NOTE — PROGRESS NOTES
TRANSFER - OUT REPORT:    Verbal report given to Eloise on [address] being transferred to  for routine progression of care       Report consisted of patients Situation, Background, Assessment and   Recommendations(SBAR). Information from the following report(s) SBAR, Procedure Summary, and MAR was reviewed with the receiving nurse. Opportunity for questions and clarification was provided.

## 2022-11-13 ENCOUNTER — HOSPITAL ENCOUNTER (EMERGENCY)
Age: 37
Discharge: HOME | End: 2022-11-13
Attending: STUDENT IN AN ORGANIZED HEALTH CARE EDUCATION/TRAINING PROGRAM
Payer: COMMERCIAL

## 2022-11-13 VITALS
DIASTOLIC BLOOD PRESSURE: 104 MMHG | BODY MASS INDEX: 44.93 KG/M2 | RESPIRATION RATE: 10 BRPM | OXYGEN SATURATION: 94 % | HEART RATE: 72 BPM | HEIGHT: 61 IN | WEIGHT: 238 LBS | TEMPERATURE: 98.2 F | SYSTOLIC BLOOD PRESSURE: 154 MMHG

## 2022-11-13 DIAGNOSIS — I10 ESSENTIAL HYPERTENSION WITH GOAL BLOOD PRESSURE LESS THAN 130/80: ICD-10-CM

## 2022-11-13 DIAGNOSIS — R00.2 PALPITATIONS: Primary | ICD-10-CM

## 2022-11-13 DIAGNOSIS — I42.0 CARDIOMYOPATHY, DILATED, NONISCHEMIC (HCC): ICD-10-CM

## 2022-11-13 DIAGNOSIS — I10 ESSENTIAL HYPERTENSION: ICD-10-CM

## 2022-11-13 DIAGNOSIS — Z76.0 MEDICATION REFILL: ICD-10-CM

## 2022-11-13 DIAGNOSIS — Z95.810 ICD (IMPLANTABLE CARDIOVERTER-DEFIBRILLATOR) IN PLACE: ICD-10-CM

## 2022-11-13 DIAGNOSIS — I49.3 SYMPTOMATIC PVCS: ICD-10-CM

## 2022-11-13 DIAGNOSIS — I10 PRIMARY HYPERTENSION: ICD-10-CM

## 2022-11-13 LAB
ALBUMIN SERPL-MCNC: 3.4 G/DL (ref 3.5–5)
ALBUMIN/GLOB SERPL: 0.9 {RATIO} (ref 1.1–2.2)
ALP SERPL-CCNC: 97 U/L (ref 45–117)
ALT SERPL-CCNC: 114 U/L (ref 12–78)
ANION GAP SERPL CALC-SCNC: 5 MMOL/L (ref 5–15)
AST SERPL-CCNC: 40 U/L (ref 15–37)
BASOPHILS # BLD: 0 K/UL (ref 0–0.1)
BASOPHILS NFR BLD: 1 % (ref 0–1)
BILIRUB SERPL-MCNC: 0.6 MG/DL (ref 0.2–1)
BUN SERPL-MCNC: 13 MG/DL (ref 6–20)
BUN/CREAT SERPL: 16 (ref 12–20)
CALCIUM SERPL-MCNC: 8.5 MG/DL (ref 8.5–10.1)
CHLORIDE SERPL-SCNC: 111 MMOL/L (ref 97–108)
CO2 SERPL-SCNC: 26 MMOL/L (ref 21–32)
COMMENT, HOLDF: NORMAL
CREAT SERPL-MCNC: 0.81 MG/DL (ref 0.55–1.02)
DIFFERENTIAL METHOD BLD: NORMAL
EOSINOPHIL # BLD: 0.3 K/UL (ref 0–0.4)
EOSINOPHIL NFR BLD: 6 % (ref 0–7)
ERYTHROCYTE [DISTWIDTH] IN BLOOD BY AUTOMATED COUNT: 13.9 % (ref 11.5–14.5)
GLOBULIN SER CALC-MCNC: 4 G/DL (ref 2–4)
GLUCOSE SERPL-MCNC: 91 MG/DL (ref 65–100)
HCG SERPL QL: NEGATIVE
HCT VFR BLD AUTO: 36.4 % (ref 35–47)
HGB BLD-MCNC: 11.7 G/DL (ref 11.5–16)
IMM GRANULOCYTES # BLD AUTO: 0 K/UL (ref 0–0.04)
IMM GRANULOCYTES NFR BLD AUTO: 0 % (ref 0–0.5)
LYMPHOCYTES # BLD: 2.6 K/UL (ref 0.8–3.5)
LYMPHOCYTES NFR BLD: 48 % (ref 12–49)
MAGNESIUM SERPL-MCNC: 1.7 MG/DL (ref 1.6–2.4)
MCH RBC QN AUTO: 29 PG (ref 26–34)
MCHC RBC AUTO-ENTMCNC: 32.1 G/DL (ref 30–36.5)
MCV RBC AUTO: 90.1 FL (ref 80–99)
MONOCYTES # BLD: 0.5 K/UL (ref 0–1)
MONOCYTES NFR BLD: 10 % (ref 5–13)
NEUTS SEG # BLD: 1.9 K/UL (ref 1.8–8)
NEUTS SEG NFR BLD: 35 % (ref 32–75)
NRBC # BLD: 0 K/UL (ref 0–0.01)
NRBC BLD-RTO: 0 PER 100 WBC
PLATELET # BLD AUTO: 196 K/UL (ref 150–400)
PMV BLD AUTO: 11.9 FL (ref 8.9–12.9)
POTASSIUM SERPL-SCNC: 3.7 MMOL/L (ref 3.5–5.1)
PROT SERPL-MCNC: 7.4 G/DL (ref 6.4–8.2)
RBC # BLD AUTO: 4.04 M/UL (ref 3.8–5.2)
SAMPLES BEING HELD,HOLD: NORMAL
SODIUM SERPL-SCNC: 142 MMOL/L (ref 136–145)
TROPONIN-HIGH SENSITIVITY: 82 NG/L (ref 0–51)
WBC # BLD AUTO: 5.5 K/UL (ref 3.6–11)

## 2022-11-13 PROCEDURE — 36415 COLL VENOUS BLD VENIPUNCTURE: CPT

## 2022-11-13 PROCEDURE — 84703 CHORIONIC GONADOTROPIN ASSAY: CPT

## 2022-11-13 PROCEDURE — 74011250637 HC RX REV CODE- 250/637: Performed by: STUDENT IN AN ORGANIZED HEALTH CARE EDUCATION/TRAINING PROGRAM

## 2022-11-13 PROCEDURE — 93005 ELECTROCARDIOGRAM TRACING: CPT

## 2022-11-13 PROCEDURE — 80053 COMPREHEN METABOLIC PANEL: CPT

## 2022-11-13 PROCEDURE — 85025 COMPLETE CBC W/AUTO DIFF WBC: CPT

## 2022-11-13 PROCEDURE — 83735 ASSAY OF MAGNESIUM: CPT

## 2022-11-13 PROCEDURE — 99284 EMERGENCY DEPT VISIT MOD MDM: CPT

## 2022-11-13 PROCEDURE — 84484 ASSAY OF TROPONIN QUANT: CPT

## 2022-11-13 RX ORDER — CARVEDILOL 12.5 MG/1
25 TABLET ORAL
Status: COMPLETED | OUTPATIENT
Start: 2022-11-13 | End: 2022-11-13

## 2022-11-13 RX ORDER — CARVEDILOL 25 MG/1
TABLET ORAL
Qty: 180 TABLET | Refills: 3 | Status: SHIPPED | OUTPATIENT
Start: 2022-11-13

## 2022-11-13 RX ADMIN — CARVEDILOL 25 MG: 12.5 TABLET, FILM COATED ORAL at 23:16

## 2022-11-14 LAB
ATRIAL RATE: 65 BPM
CALCULATED P AXIS, ECG09: 38 DEGREES
CALCULATED R AXIS, ECG10: 9 DEGREES
CALCULATED T AXIS, ECG11: 13 DEGREES
DIAGNOSIS, 93000: NORMAL
P-R INTERVAL, ECG05: 186 MS
Q-T INTERVAL, ECG07: 420 MS
QRS DURATION, ECG06: 102 MS
QTC CALCULATION (BEZET), ECG08: 436 MS
VENTRICULAR RATE, ECG03: 65 BPM

## 2022-11-14 NOTE — ED PROVIDER NOTES
EMERGENCY DEPARTMENT HISTORY AND PHYSICAL EXAM      Date: 11/13/2022  Patient Name: Oj Balderas    History of Presenting Illness     HPI: Oj Balderas, 44 y.o. female with past medical history of CHF, cardiomyopathy, status post ICD implantation (exchanged from [employer] from [facility] [address] on 11/11 by Dr. Alejandro Barrera), presents to the ED with cc of \"thumping\" palpitations earlier tonight around 1900. States she was sitting on the couch watching TV when these sensations began. States this was associated with lightheadedness and dizziness. Episode lasted approximately 30 minutes. Patient reports symptoms have now completely resolved. She denies any other symptoms at this time. PCP: None    No current facility-administered medications on file prior to encounter. Current Outpatient Medications on File Prior to Encounter   Medication Sig Dispense Refill    potassium chloride (KLOR-CON M10) 10 mEq tablet Take 1 Tablet by mouth daily. 90 Tablet 3    sacubitriL-valsartan (Entresto) 49-51 mg tab tablet Take 1 Tablet by mouth two (2) times a day. 180 Tablet 3    magnesium oxide (MAG-OX) 400 mg tablet Take 1 Tablet by mouth two (2) times a day. 180 Tablet 3    spironolactone (ALDACTONE) 25 mg tablet TAKE ONE TABLET BY MOUTH DAILY 90 Tablet 3    carvediloL (COREG) 25 mg tablet TAKE ONE TABLET BY MOUTH TWICE A DAY WITH A MEAL 180 Tablet 3    bumetanide (BUMEX) 2 mg tablet TAKE ONE TABLET BY MOUTH TWICE A DAY AS NEEDED 180 Tablet 3    MULTIVITAMIN PO Take  by mouth. Takes one po once daily. OTHER Takes iron po once daily. Will bring in strength.      b complex vitamins Liqd Take  by mouth daily.          Past History     Past Medical History:  Past Medical History:   Diagnosis Date    Arrhythmia     BRADYCARDIA    Cardiomyopathy, dilated, nonischemic (HCC)     Congestive heart failure, unspecified     Essential hypertension     Heart failure ([facility] [address]     Hypertension     ICD (implantable cardiac defibrillator) in place [facility] dual coil, single lead    Morbid obesity ([address]     Murmur     Syncope     Syncope and collapse 12/22/2011    TIA (transient ischemic attack)     Valvular heart disease        Past Surgical History:  Past Surgical History:   Procedure Laterality Date    COLONOSCOPY N/A 3/21/2018    COLONOSCOPY performed by Adarsh Cai MD at [address] [place] [address]  2008    HX CHOLECYSTECTOMY  2010    HX GASTRIC BYPASS      HX PACEMAKER      DEFIBRILLATOR    INS PPM/ICD LED SING ONLY  1/30/2012         VT COMPRE ELECTROPHYSIOL XM W/LEFT ATRIAL PACNG/REC  3/2/2018         VT COMPRE ELECTROPHYSIOLOGIC ARRHYTHMIA INDUCTION  3/2/2018         VT STIM/PACING HEART POST IV DRUG INFU  3/2/2018         BELLO DOPPLER  2/25/2013            Family History:  Family History   Problem Relation Age of Onset    Diabetes Paternal Grandmother        Social History:  Social History     Tobacco Use    Smoking status: Never    Smokeless tobacco: Never   Substance Use Topics    Alcohol use: Yes     Alcohol/week: 4.0 standard drinks     Types: 4 Standard drinks or equivalent per week     Comment: socially    Drug use: No       Allergies: Allergies   Allergen Reactions    Bee Sting [Sting, Bee] Anaphylaxis    Bee Venom Protein (Honey Bee) Swelling    Activase [Alteplase] Swelling     Facial swelling      Lisinopril Cough         Review of Systems   Review of Systems   Constitutional:  Negative for chills and fever. HENT:  Negative for congestion and rhinorrhea. Eyes:  Negative for visual disturbance. Respiratory:  Negative for chest tightness and shortness of breath. Cardiovascular:  Positive for palpitations. Negative for chest pain and leg swelling. Gastrointestinal:  Negative for abdominal pain, diarrhea, nausea and vomiting. Genitourinary:  Negative for dysuria, flank pain and hematuria. Musculoskeletal:  Negative for back pain and neck pain. Skin:  Negative for rash.    Allergic/Immunologic: Negative for immunocompromised state. Neurological:  Positive for light-headedness. Negative for dizziness, speech difficulty, weakness and headaches. Hematological:  Negative for adenopathy. Psychiatric/Behavioral:  Negative for dysphoric mood and suicidal ideas. Physical Exam   Physical Exam  Vitals and nursing note reviewed. Constitutional:       General: She is not in acute distress. Appearance: She is not ill-appearing or toxic-appearing. HENT:      Head: Normocephalic and atraumatic. Nose: Nose normal.      Mouth/Throat:      Mouth: Mucous membranes are moist.   Eyes:      Extraocular Movements: Extraocular movements intact. Pupils: Pupils are equal, round, and reactive to light. Cardiovascular:      Rate and Rhythm: Normal rate and regular rhythm. Pulses: Normal pulses. Pulmonary:      Effort: Pulmonary effort is normal.      Breath sounds: No stridor. No decreased breath sounds, wheezing or rhonchi. Abdominal:      General: Abdomen is flat. There is no distension. Tenderness: There is no abdominal tenderness. Musculoskeletal:         General: Normal range of motion. Cervical back: Normal range of motion and neck supple. Right lower leg: No tenderness. No edema. Left lower leg: No tenderness. No edema. Skin:     General: Skin is warm and dry. Neurological:      General: No focal deficit present. Mental Status: She is alert and oriented to person, place, and time.    Psychiatric:         Judgment: Judgment normal.       Diagnostic Study Results     Labs -     Recent Results (from the past 78 hour(s))   EKG, 12 LEAD, INITIAL    Collection Time: 11/13/22  8:51 PM   Result Value Ref Range    Ventricular Rate 65 BPM    Atrial Rate 65 BPM    P-R Interval 186 ms    QRS Duration 102 ms    Q-T Interval 420 ms    QTC Calculation (Bezet) 436 ms    Calculated P Axis 38 degrees    Calculated R Axis 9 degrees    Calculated T Axis 13 degrees    Diagnosis       Normal sinus rhythm  When compared with ECG of 07-JUL-2022 04:57,  No significant change was found  Confirmed by Ok Varma MD ([id number]) on 11/14/2022 7:45:53 AM     METABOLIC PANEL, COMPREHENSIVE    Collection Time: 11/13/22  9:03 PM   Result Value Ref Range    Sodium 142 136 - 145 mmol/L    Potassium 3.7 3.5 - 5.1 mmol/L    Chloride 111 (H) 97 - 108 mmol/L    CO2 26 21 - 32 mmol/L    Anion gap 5 5 - 15 mmol/L    Glucose 91 65 - 100 mg/dL    BUN 13 6 - 20 MG/DL    Creatinine 0.81 0.55 - 1.02 MG/DL    BUN/Creatinine ratio 16 12 - 20      eGFR >60 >60 ml/min/1.73m2    Calcium 8.5 8.5 - 10.1 MG/DL    Bilirubin, total 0.6 0.2 - 1.0 MG/DL    ALT (SGPT) 114 (H) 12 - 78 U/L    AST (SGOT) 40 (H) 15 - 37 U/L    Alk. phosphatase 97 45 - 117 U/L    Protein, total 7.4 6.4 - 8.2 g/dL    Albumin 3.4 (L) 3.5 - 5.0 g/dL    Globulin 4.0 2.0 - 4.0 g/dL    A-G Ratio 0.9 (L) 1.1 - 2.2     CBC WITH AUTOMATED DIFF    Collection Time: 11/13/22  9:03 PM   Result Value Ref Range    WBC 5.5 3.6 - 11.0 K/uL    RBC 4.04 3.80 - 5.20 M/uL    HGB 11.7 11.5 - 16.0 g/dL    HCT 36.4 35.0 - 47.0 %    MCV 90.1 80.0 - 99.0 FL    MCH 29.0 26.0 - 34.0 PG    MCHC 32.1 30.0 - 36.5 g/dL    RDW 13.9 11.5 - 14.5 %    PLATELET 075 794 - 349 K/uL    MPV 11.9 8.9 - 12.9 FL    NRBC 0.0 0  WBC    ABSOLUTE NRBC 0.00 0.00 - 0.01 K/uL    NEUTROPHILS 35 32 - 75 %    LYMPHOCYTES 48 12 - 49 %    MONOCYTES 10 5 - 13 %    EOSINOPHILS 6 0 - 7 %    BASOPHILS 1 0 - 1 %    IMMATURE GRANULOCYTES 0 0.0 - 0.5 %    ABS. NEUTROPHILS 1.9 1.8 - 8.0 K/UL    ABS. LYMPHOCYTES 2.6 0.8 - 3.5 K/UL    ABS. MONOCYTES 0.5 0.0 - 1.0 K/UL    ABS. EOSINOPHILS 0.3 0.0 - 0.4 K/UL    ABS. BASOPHILS 0.0 0.0 - 0.1 K/UL    ABS. IMM.  GRANS. 0.0 0.00 - 0.04 K/UL    DF AUTOMATED     MAGNESIUM    Collection Time: 11/13/22  9:03 PM   Result Value Ref Range    Magnesium 1.7 1.6 - 2.4 mg/dL   SAMPLES BEING HELD    Collection Time: 11/13/22  9:03 PM   Result Value Ref Range    SAMPLES BEING HELD 1RED, BLUE     COMMENT        Add-on orders for these samples will be processed based on acceptable specimen integrity and analyte stability, which may vary by analyte. TROPONIN-HIGH SENSITIVITY    Collection Time: 11/13/22  9:03 PM   Result Value Ref Range    Troponin-High Sensitivity 82 (H) 0 - 51 ng/L   HCG QL SERUM    Collection Time: 11/13/22  9:03 PM   Result Value Ref Range    HCG, Ql. Negative NEG         Radiologic Studies -   No orders to display     CT Results  (Last 48 hours)      None          CXR Results  (Last 48 hours)      None            Medical Decision Making   I, Dk Garcia MD-- am the first provider for this patient, and I am the attending of record for this patient encounter. I reviewed the vital signs, available nursing notes, past medical history, past surgical history, family history and social history. Vital Signs-Reviewed the patient's vital signs. Patient Vitals for the past 24 hrs:   Temp Pulse Resp BP SpO2   11/13/22 2100 -- 70 14 (!) 151/101 94 %   11/13/22 2044 98.2 °F (36.8 °C) 70 19 (!) 156/115 98 %       EKG interpretation: (Preliminary)  Normal sinus rhythm, 65 bpm, no acute ST changes concerning for ischemia. QTc normal. EKG interpretation by Dk Garcia MD    Records Reviewed: Nursing Notes and Old Medical Records    Provider Notes (Medical Decision Making): Will check EKG, labs, electrolytes, and interrogate pacemaker to assess for any aberrant arrhythmias. ED Course as of 11/17/22 0036   Sun Nov 13, 2022   2240 Abbott ICD interrogation without SVT, VF/VT, ATP or shocks delivered. [JM]      ED Course User Index  [JM] Soham Garibay MD     Labs largely reassuring. Troponin is slightly elevated at 82, which is actually improved compared to patient's baseline of ~120. Results of work-up discussed with the patient. She is noted to intermittently have brief episodes of isolated PVCs on the ED cardiac monitor, corresponding with patient's sensation of chest \"thumping. \" Patient is reassured that these are largely benign arrhythmias, which she has previously had a documented history of. Patient is also noted to be slightly hypertensive in the ED. she shares that she has run out of her Coreg prescription, and has not been taking this medication over the past several days. Will administer home dose of Coreg at this time. I will also refill her Coreg for the next several months. She can be safely discharged to follow-up with her cardiologist as an outpatient. Reasons to return to the ED were discussed. ED Course:   Initial assessment performed. The patient's presenting problems have been discussed, and they are in agreement with the care plan formulated and outlined with them. I have encouraged them to ask questions as they arise throughout their visit. María Elena Monet MD      Disposition:  MD    DISCHARGE PLAN:  1. Discharge Medication List as of 11/13/2022 11:31 PM        CONTINUE these medications which have CHANGED    Details   carvediloL (COREG) 25 mg tablet TAKE ONE TABLET BY MOUTH TWICE A DAY WITH A MEAL, Normal, Disp-180 Tablet, R-3           CONTINUE these medications which have NOT CHANGED    Details   potassium chloride (KLOR-CON M10) 10 mEq tablet Take 1 Tablet by mouth daily. , Normal, Disp-90 Tablet, R-3      sacubitriL-valsartan (Entresto) 49-51 mg tab tablet Take 1 Tablet by mouth two (2) times a day., Normal, Disp-180 Tablet, R-3      magnesium oxide (MAG-OX) 400 mg tablet Take 1 Tablet by mouth two (2) times a day., Normal, Disp-180 Tablet, R-3      spironolactone (ALDACTONE) 25 mg tablet TAKE ONE TABLET BY MOUTH DAILY, Normal, Disp-90 Tablet, R-3      bumetanide (BUMEX) 2 mg tablet TAKE ONE TABLET BY MOUTH TWICE A DAY AS NEEDED, Normal, Disp-180 Tablet, R-3      MULTIVITAMIN PO Take  by mouth. Takes one po once daily. , Historical Med      OTHER Takes iron po once daily.  Will bring in strength., Historical Med      b complex vitamins Liqd Take  by mouth daily., Historical Med           2. Follow-up Information       Follow up With Specialties Details Why Contact Info    Megan Garrison MD Cardiovascular Disease Physician, Clinical Cardiac Electrophysiology Physician Schedule an appointment as soon as possible for a visit in 3 days  [address]            3. Return to ED if worse     Diagnosis     Clinical Impression:   1. Symptomatic PVCs    2. ICD (implantable cardioverter-defibrillator) in place    3. Medication refill    4. Primary hypertension         Attestations:    Evelyne Dickinson MD    Please note that this dictation was completed with [employer], the [employer] voice recognition software. Quite often unanticipated grammatical, syntax, homophones, and other interpretive errors are inadvertently transcribed by the computer software. Please disregard these errors. Please excuse any errors that have escaped final proofreading. Thank you.

## 2022-11-14 NOTE — ED TRIAGE NOTES
BIB EMS with c/o lightheadedness and chest pain around 1900. Had ICD replaced on Friday. EKG showed SR with PVCs. Hx of pregnancy induced heart failure. Has Abbott ICD.

## 2022-11-18 ENCOUNTER — CLINICAL SUPPORT (OUTPATIENT)
Dept: CARDIOLOGY CLINIC | Age: 37
End: 2022-11-18

## 2022-11-18 DIAGNOSIS — Z95.810 AUTOMATIC IMPLANTABLE CARDIAC DEFIBRILLATOR IN SITU: Primary | ICD-10-CM

## 2022-11-18 DIAGNOSIS — Z95.810 PRESENCE OF CARDIOVERTER DEFIBRILLATOR: Primary | ICD-10-CM

## 2022-11-18 PROCEDURE — 93289 INTERROG DEVICE EVAL HEART: CPT | Performed by: INTERNAL MEDICINE

## 2022-11-18 NOTE — PROGRESS NOTES
Cardiac Electrophysiology Wound Check Note      Wound Check   Patient is here for wound check. No fever, drainage   Physical Exam   Constitutional: well-developed and well-nourished. Skin: Left side pocket is healing without redness, drainage, hematoma. The wound is intact with skin glue. ASSESSMENT and PLAN   The incision is healing without redness, drainage, hematoma. Intact with skin glue. current treatment plan is effective, no change in therapy   Device check shows proper lead and generator functions    Follow-up Disposition:  Return 3 months I will check via device clinic or remote monitoring in the future    Letter regarding covid vaccine signed by Dr. Willian Cruz given to patient.

## 2023-01-25 ENCOUNTER — PATIENT MESSAGE (OUTPATIENT)
Dept: CARDIOLOGY CLINIC | Age: 38
End: 2023-01-25

## 2023-01-25 DIAGNOSIS — Z79.899 LONG TERM CURRENT USE OF AMIODARONE: Primary | ICD-10-CM

## 2023-01-25 DIAGNOSIS — I49.3 PVC (PREMATURE VENTRICULAR CONTRACTION): ICD-10-CM

## 2023-01-25 DIAGNOSIS — I42.0 CARDIOMYOPATHY, DILATED, NONISCHEMIC (HCC): ICD-10-CM

## 2023-01-25 DIAGNOSIS — I47.20 VT (VENTRICULAR TACHYCARDIA): ICD-10-CM

## 2023-01-26 ENCOUNTER — DOCUMENTATION ONLY (OUTPATIENT)
Dept: CARDIOLOGY CLINIC | Age: 38
End: 2023-01-26

## 2023-01-26 RX ORDER — AMIODARONE HYDROCHLORIDE 200 MG/1
200 TABLET ORAL DAILY
Qty: 30 TABLET | Refills: 5 | Status: SHIPPED | OUTPATIENT
Start: 2023-01-26

## 2023-01-26 NOTE — TELEPHONE ENCOUNTER
From: Abbey Sanderson  To: Madi Cota MD  Sent: 1/25/2023 11:55 PM EST  Subject: Unsettling heart palpitations     Hello,   I've been having these hard palpitations or \"thumps \" for the past 2 or 3 days. I have sent reports thru the application. Can Dr Lynda Gupta look into this please. It's very uncomfortable and unsettling. Nothing seems to suppress it. It has also kept me up at night as you'll may imagine. Please call me as soon as possible.  Thank you

## 2023-01-26 NOTE — TELEPHONE ENCOUNTER
Per Dr. Mary Cotter \"I need her repeat echocardiogram (EF was 30% last year)   I will start amiodarone 200 mg every day in the mean time   Need labs CMP, TSH, beta HCG and chest xray\"    Verified patient with two types of identifiers. Notified patient of MD recommendations. Patient is agreeable. Scheduled echo. Will place orders for labs. Verified pharmacy. Patient verbalized understanding and will call with any other questions.       Future Appointments   Date Time Provider Shanae Vargas   2/3/2023  1:00 PM Kaitlynn HICKS BS AMB   2/6/2023  3:00 PM REMOTE1, CARLIN HICKS BS AMB   4/28/2023  3:00 PM ECHO, CARLIN RAIN AMB   4/28/2023  4:00 PM MD FABIOLA Heart BS AMB   5/10/2023  4:00 PM REMOTE1, CARLIN RAIN AMB   8/29/2023  3:00 PM PACEMAKER3, CARLIN HICKS BS AMB   8/29/2023  3:30 PM ALMA Gabriel BS AMB

## 2023-01-26 NOTE — TELEPHONE ENCOUNTER
Verified patient with two types of identifiers. Transmissions received and viewed by Dr. Fidelina Loera; shows some PVCs. Patient states that the thumping has improved today but was very uncomfortable over the past few days. At the time she felt these sensations, her heart rate and blood pressure were normal.  BP yesterday was 130/90. Patient states that she did experience some lightheadedness during these episodes but she is also recovering from a cold. She is compliant with her medications. Will forward to Dr. Fidelina Loera and Patricia Brownlee NP. Patient verbalized understanding and will call with any other questions.

## 2023-02-03 ENCOUNTER — ANCILLARY PROCEDURE (OUTPATIENT)
Dept: CARDIOLOGY CLINIC | Age: 38
End: 2023-02-03
Payer: COMMERCIAL

## 2023-02-03 ENCOUNTER — TELEPHONE (OUTPATIENT)
Dept: CARDIOLOGY CLINIC | Age: 38
End: 2023-02-03

## 2023-02-03 VITALS
DIASTOLIC BLOOD PRESSURE: 92 MMHG | WEIGHT: 238 LBS | SYSTOLIC BLOOD PRESSURE: 130 MMHG | HEIGHT: 61 IN | BODY MASS INDEX: 44.93 KG/M2

## 2023-02-03 DIAGNOSIS — I42.0 CARDIOMYOPATHY, DILATED, NONISCHEMIC (HCC): ICD-10-CM

## 2023-02-03 DIAGNOSIS — Z79.899 LONG TERM CURRENT USE OF AMIODARONE: ICD-10-CM

## 2023-02-03 DIAGNOSIS — I49.3 PVC (PREMATURE VENTRICULAR CONTRACTION): ICD-10-CM

## 2023-02-03 DIAGNOSIS — I47.20 VT (VENTRICULAR TACHYCARDIA): ICD-10-CM

## 2023-02-03 LAB
ALBUMIN SERPL-MCNC: 3.5 G/DL (ref 3.5–5)
ALBUMIN/GLOB SERPL: 0.9 (ref 1.1–2.2)
ALP SERPL-CCNC: 84 U/L (ref 45–117)
ALT SERPL-CCNC: 30 U/L (ref 12–78)
ANION GAP SERPL CALC-SCNC: 1 MMOL/L (ref 5–15)
AST SERPL-CCNC: 26 U/L (ref 15–37)
BILIRUB SERPL-MCNC: 0.5 MG/DL (ref 0.2–1)
BUN SERPL-MCNC: 10 MG/DL (ref 6–20)
BUN/CREAT SERPL: 10 (ref 12–20)
CALCIUM SERPL-MCNC: 9 MG/DL (ref 8.5–10.1)
CHLORIDE SERPL-SCNC: 109 MMOL/L (ref 97–108)
CO2 SERPL-SCNC: 29 MMOL/L (ref 21–32)
CREAT SERPL-MCNC: 1 MG/DL (ref 0.55–1.02)
ECHO AO ASC DIAM: 3.5 CM
ECHO AO ASCENDING AORTA INDEX: 1.72 CM/M2
ECHO AO ROOT DIAM: 3.4 CM
ECHO AO ROOT INDEX: 1.67 CM/M2
ECHO AV MEAN GRADIENT: 5 MMHG
ECHO AV MEAN VELOCITY: 1.1 M/S
ECHO AV PEAK GRADIENT: 10 MMHG
ECHO AV PEAK VELOCITY: 1.6 M/S
ECHO AV VELOCITY RATIO: 0.63
ECHO AV VTI: 27.2 CM
ECHO EST RA PRESSURE: 3 MMHG
ECHO LA DIAMETER INDEX: 1.77 CM/M2
ECHO LA DIAMETER: 3.6 CM
ECHO LA TO AORTIC ROOT RATIO: 1.06
ECHO LA VOL 2C: 53 ML (ref 22–52)
ECHO LA VOL 4C: 85 ML (ref 22–52)
ECHO LA VOL BP: 70 ML (ref 22–52)
ECHO LA VOL/BSA BIPLANE: 34 ML/M2 (ref 16–34)
ECHO LA VOLUME AREA LENGTH: 75 ML
ECHO LA VOLUME INDEX A2C: 26 ML/M2 (ref 16–34)
ECHO LA VOLUME INDEX A4C: 42 ML/M2 (ref 16–34)
ECHO LA VOLUME INDEX AREA LENGTH: 37 ML/M2 (ref 16–34)
ECHO LV E' LATERAL VELOCITY: 16 CM/S
ECHO LV E' SEPTAL VELOCITY: 8 CM/S
ECHO LV EDV A2C: 78 ML
ECHO LV EDV A4C: 110 ML
ECHO LV EDV BP: 93 ML (ref 56–104)
ECHO LV EDV INDEX A4C: 54 ML/M2
ECHO LV EDV INDEX BP: 46 ML/M2
ECHO LV EDV NDEX A2C: 38 ML/M2
ECHO LV EJECTION FRACTION A2C: 48 %
ECHO LV EJECTION FRACTION A4C: 53 %
ECHO LV EJECTION FRACTION BIPLANE: 51 % (ref 55–100)
ECHO LV ESV A2C: 40 ML
ECHO LV ESV A4C: 51 ML
ECHO LV ESV BP: 45 ML (ref 19–49)
ECHO LV ESV INDEX A2C: 20 ML/M2
ECHO LV ESV INDEX A4C: 25 ML/M2
ECHO LV ESV INDEX BP: 22 ML/M2
ECHO LV FRACTIONAL SHORTENING: 24 % (ref 28–44)
ECHO LV INTERNAL DIMENSION DIASTOLE INDEX: 2.46 CM/M2
ECHO LV INTERNAL DIMENSION DIASTOLIC: 5 CM (ref 3.9–5.3)
ECHO LV INTERNAL DIMENSION SYSTOLIC INDEX: 1.87 CM/M2
ECHO LV INTERNAL DIMENSION SYSTOLIC: 3.8 CM
ECHO LV IVSD: 1.1 CM (ref 0.6–0.9)
ECHO LV MASS 2D: 247.6 G (ref 67–162)
ECHO LV MASS INDEX 2D: 122 G/M2 (ref 43–95)
ECHO LV POSTERIOR WALL DIASTOLIC: 1.4 CM (ref 0.6–0.9)
ECHO LV RELATIVE WALL THICKNESS RATIO: 0.56
ECHO LVOT AV VTI INDEX: 0.7
ECHO LVOT MEAN GRADIENT: 2 MMHG
ECHO LVOT PEAK GRADIENT: 4 MMHG
ECHO LVOT PEAK VELOCITY: 1 M/S
ECHO LVOT VTI: 19 CM
ECHO MV A VELOCITY: 0.47 M/S
ECHO MV AREA PHT: 3.8 CM2
ECHO MV E DECELERATION TIME (DT): 198.5 MS
ECHO MV E VELOCITY: 0.63 M/S
ECHO MV E/A RATIO: 1.34
ECHO MV E/E' LATERAL: 3.94
ECHO MV E/E' RATIO (AVERAGED): 5.91
ECHO MV E/E' SEPTAL: 7.88
ECHO MV PRESSURE HALF TIME (PHT): 57.6 MS
ECHO PV MAX VELOCITY: 0.7 M/S
ECHO PV MEAN GRADIENT: 1 MMHG
ECHO PV MEAN VELOCITY: 0.4 M/S
ECHO PV PEAK GRADIENT: 2 MMHG
ECHO PV VTI: 14 CM
ECHO RA AREA 4C: 19 CM2
ECHO RA END SYSTOLIC VOLUME APICAL 4 CHAMBER INDEX BSA: 29 ML/M2
ECHO RA VOLUME AREA LENGTH APICAL 4 CHAMBER: 58 ML
ECHO RA VOLUME: 58 ML
ECHO RIGHT VENTRICULAR SYSTOLIC PRESSURE (RVSP): 22 MMHG
ECHO RV FREE WALL PEAK S': 13 CM/S
ECHO RV INTERNAL DIMENSION: 3.3 CM
ECHO RV TAPSE: 2.6 CM (ref 1.7–?)
ECHO TV REGURGITANT MAX VELOCITY: 2.16 M/S
ECHO TV REGURGITANT PEAK GRADIENT: 19 MMHG
GLOBULIN SER CALC-MCNC: 3.9 G/DL (ref 2–4)
GLUCOSE SERPL-MCNC: 95 MG/DL (ref 65–100)
HCG SERPL-ACNC: <1 MIU/ML (ref 0–6)
POTASSIUM SERPL-SCNC: 4.2 MMOL/L (ref 3.5–5.1)
PROT SERPL-MCNC: 7.4 G/DL (ref 6.4–8.2)
SODIUM SERPL-SCNC: 139 MMOL/L (ref 136–145)
TSH SERPL DL<=0.05 MIU/L-ACNC: 1.18 UIU/ML (ref 0.36–3.74)

## 2023-02-03 NOTE — TELEPHONE ENCOUNTER
----- Message from Nam Mcconnell MD sent at 2/3/2023  5:00 PM EST -----  Heart function is now normal

## 2023-02-06 ENCOUNTER — OFFICE VISIT (OUTPATIENT)
Dept: CARDIOLOGY CLINIC | Age: 38
End: 2023-02-06
Payer: COMMERCIAL

## 2023-02-06 DIAGNOSIS — Z95.810 PRESENCE OF CARDIOVERTER DEFIBRILLATOR: Primary | ICD-10-CM

## 2023-02-06 PROCEDURE — 93295 DEV INTERROG REMOTE 1/2/MLT: CPT | Performed by: INTERNAL MEDICINE

## 2023-02-06 PROCEDURE — 93296 REM INTERROG EVL PM/IDS: CPT | Performed by: INTERNAL MEDICINE

## 2023-02-07 ENCOUNTER — TELEPHONE (OUTPATIENT)
Dept: CARDIOLOGY CLINIC | Age: 38
End: 2023-02-07

## 2023-02-07 NOTE — TELEPHONE ENCOUNTER
----- Message from Steve Foster NP sent at 2/7/2023  7:41 AM EST -----  Liver enzymes normalized, normal TSH. OK to continue amiodarone.       Future Appointments  4/28/2023  3:00 PM    ECHOCARLIN AMB  4/28/2023  4:00 PM    MD FABIOLA Rose AMB  5/10/2023  4:00 PM    REMOTE1CARLIN AMB  8/29/2023  3:00 PM    PACEMAKER3, CARLIN RAIN AMB  8/29/2023  3:30 PM    ALMA Stevens AMB

## 2023-02-07 NOTE — PROGRESS NOTES
Liver enzymes normalized, normal TSH. OK to continue amiodarone.       Future Appointments  4/28/2023  3:00 PM    ECHO, CARLIN RAIN AMB  4/28/2023  4:00 PM    MD FABIOLA Rose AMB  5/10/2023  4:00 PM    REMOTE1, CARLIN RAIN AMB  8/29/2023  3:00 PM    PACEMAKER3, CARLIN RAIN AMB  8/29/2023  3:30 PM    Latonya Garcia, ALMA RAIN AMB

## 2023-02-10 DIAGNOSIS — I42.0 CARDIOMYOPATHY, DILATED, NONISCHEMIC (HCC): ICD-10-CM

## 2023-02-10 RX ORDER — SPIRONOLACTONE 25 MG/1
TABLET ORAL
Qty: 30 TABLET | Refills: 2 | Status: SHIPPED | OUTPATIENT
Start: 2023-02-10

## 2023-02-21 NOTE — PROGRESS NOTES
C/ ROSENBERG REMOTE   Scheduled remote transmission. Device functioning appropriately as programmed. See scanned documents.  Chargeable visit

## 2023-02-23 ENCOUNTER — DOCUMENTATION ONLY (OUTPATIENT)
Dept: CARDIOLOGY CLINIC | Age: 38
End: 2023-02-23

## 2023-02-23 NOTE — PROGRESS NOTES
Received request for electric stimulation to RUE for muscle and nerve stimulation from [facility]. Faxed recommendations from Jenny Paniagua NP. Confirmation of fax received. Document sent for scanning.

## 2023-04-27 DIAGNOSIS — I42.0 CARDIOMYOPATHY, DILATED, NONISCHEMIC (HCC): Primary | ICD-10-CM

## 2023-04-27 DIAGNOSIS — Z95.810 AUTOMATIC IMPLANTABLE CARDIAC DEFIBRILLATOR IN SITU: ICD-10-CM

## 2023-04-27 DIAGNOSIS — I34.0 NONRHEUMATIC MITRAL VALVE REGURGITATION: ICD-10-CM

## 2023-04-28 ENCOUNTER — OFFICE VISIT (OUTPATIENT)
Dept: CARDIOLOGY CLINIC | Age: 38
End: 2023-04-28

## 2023-04-28 VITALS
RESPIRATION RATE: 16 BRPM | HEIGHT: 64 IN | DIASTOLIC BLOOD PRESSURE: 90 MMHG | HEART RATE: 60 BPM | BODY MASS INDEX: 41.15 KG/M2 | WEIGHT: 241 LBS | SYSTOLIC BLOOD PRESSURE: 120 MMHG

## 2023-04-28 DIAGNOSIS — Z98.890 S/P ABLATION OF VENTRICULAR ARRHYTHMIA: ICD-10-CM

## 2023-04-28 DIAGNOSIS — I34.0 MITRAL VALVE INSUFFICIENCY, UNSPECIFIED ETIOLOGY: ICD-10-CM

## 2023-04-28 DIAGNOSIS — I10 ESSENTIAL HYPERTENSION: ICD-10-CM

## 2023-04-28 DIAGNOSIS — E66.01 MORBID OBESITY (HCC): ICD-10-CM

## 2023-04-28 DIAGNOSIS — I42.0 CARDIOMYOPATHY, DILATED, NONISCHEMIC (HCC): Primary | ICD-10-CM

## 2023-04-28 DIAGNOSIS — I47.1 PSVT (PAROXYSMAL SUPRAVENTRICULAR TACHYCARDIA) (HCC): ICD-10-CM

## 2023-04-28 DIAGNOSIS — I07.9: ICD-10-CM

## 2023-04-28 DIAGNOSIS — Z95.810 PRESENCE OF CARDIOVERTER DEFIBRILLATOR: ICD-10-CM

## 2023-04-28 DIAGNOSIS — I49.3 PVC (PREMATURE VENTRICULAR CONTRACTION): ICD-10-CM

## 2023-04-28 DIAGNOSIS — Z86.79 S/P ABLATION OF VENTRICULAR ARRHYTHMIA: ICD-10-CM

## 2023-05-10 ENCOUNTER — NURSE ONLY (OUTPATIENT)
Age: 38
End: 2023-05-10

## 2023-05-10 DIAGNOSIS — Z01.810 INPLANTABLE CARDIOVERTER-DEFIBRILLATOR IN PLACE, PRE-OPERATIVE CARDIOVASCULAR EXAMINATION: Primary | ICD-10-CM

## 2023-05-10 DIAGNOSIS — Z95.810 INPLANTABLE CARDIOVERTER-DEFIBRILLATOR IN PLACE, PRE-OPERATIVE CARDIOVASCULAR EXAMINATION: Primary | ICD-10-CM

## 2023-05-10 PROCEDURE — 93296 REM INTERROG EVL PM/IDS: CPT | Performed by: INTERNAL MEDICINE

## 2023-05-17 NOTE — PROGRESS NOTES
C/ ZENG REMOTE   Scheduled remote transmission. Device functioning appropriately as programmed. See scanned documents.  Chargeable visit

## 2023-07-13 DIAGNOSIS — I10 ESSENTIAL (PRIMARY) HYPERTENSION: Primary | ICD-10-CM

## 2023-07-13 DIAGNOSIS — I42.9 CARDIOMYOPATHY, UNSPECIFIED (HCC): ICD-10-CM

## 2023-07-17 ENCOUNTER — PATIENT MESSAGE (OUTPATIENT)
Age: 38
End: 2023-07-17

## 2023-07-18 RX ORDER — POTASSIUM CHLORIDE 750 MG/1
TABLET, EXTENDED RELEASE ORAL
Qty: 30 TABLET | Refills: 5 | Status: SHIPPED | OUTPATIENT
Start: 2023-07-18

## 2023-07-18 RX ORDER — MAGNESIUM OXIDE TAB 400 MG (241.3 MG ELEMENTAL MG) 400 (241.3 MG) MG
TAB ORAL
Qty: 60 TABLET | Refills: 5 | Status: SHIPPED | OUTPATIENT
Start: 2023-07-18

## 2023-07-18 RX ORDER — SPIRONOLACTONE 25 MG/1
25 TABLET ORAL DAILY
Qty: 30 TABLET | Refills: 5 | Status: SHIPPED | OUTPATIENT
Start: 2023-07-18

## 2023-07-18 NOTE — TELEPHONE ENCOUNTER
Requested Prescriptions     Signed Prescriptions Disp Refills    spironolactone (ALDACTONE) 25 MG tablet 30 tablet 5     Sig: Take 1 tablet by mouth daily     Authorizing Provider: Marisol Whyte     Ordering User: Maura Vilchis    potassium chloride (KLOR-CON M) 10 MEQ extended release tablet 30 tablet 5     Sig: TAKE ONE TABLET BY MOUTH DAILY     Authorizing Provider: Marisol Whyte     Ordering User: Ludmila HERNANDEZ    MAGnesium-Oxide 400 (240 Mg) MG tablet 60 tablet 5     Sig: TAKE ONE TABLET BY MOUTH TWICE A DAY     Authorizing Provider: Marisol Whyte     Ordering User: Maura Vilchis    Per Dr. Manuel Link verbal order.       Ok to fill even without labs per Dr. Mauricio Meneses

## 2023-08-21 ENCOUNTER — TELEPHONE (OUTPATIENT)
Age: 38
End: 2023-08-21

## 2023-08-21 DIAGNOSIS — I47.20 VENTRICULAR TACHYCARDIA, UNSPECIFIED (HCC): ICD-10-CM

## 2023-08-21 DIAGNOSIS — I42.9 CARDIOMYOPATHY, UNSPECIFIED (HCC): Primary | ICD-10-CM

## 2023-08-21 DIAGNOSIS — Z95.810 INPLANTABLE CARDIOVERTER-DEFIBRILLATOR IN PLACE, PRE-OPERATIVE CARDIOVASCULAR EXAMINATION: ICD-10-CM

## 2023-08-21 DIAGNOSIS — Z01.810 INPLANTABLE CARDIOVERTER-DEFIBRILLATOR IN PLACE, PRE-OPERATIVE CARDIOVASCULAR EXAMINATION: ICD-10-CM

## 2023-08-21 DIAGNOSIS — I49.3 VENTRICULAR PREMATURE DEPOLARIZATION: ICD-10-CM

## 2023-08-21 DIAGNOSIS — R00.2 PALPITATIONS: ICD-10-CM

## 2023-08-21 NOTE — TELEPHONE ENCOUNTER
Received remote report from patient's ICD, noted SVT x 2 minutes. No EGMs available for review. Currently on amiodarone 200 mg po daily & carvedilol 25 mg po bid. Please check CMP & TSH; these were last done >6 months ago. She has follow up already scheduled next week.       Future Appointments   Date Time Provider [address]   8/29/2023  3:00 PM Macrina LEBLANC AMB   8/29/2023  3:30 PM Stan Waite, APRN - NP CAVREY BS AMB   10/30/2023  4:00 PM MD MINERVA Zamudio AMB

## 2023-08-22 DIAGNOSIS — Z95.810 INPLANTABLE CARDIOVERTER-DEFIBRILLATOR IN PLACE, PRE-OPERATIVE CARDIOVASCULAR EXAMINATION: ICD-10-CM

## 2023-08-22 DIAGNOSIS — I49.3 VENTRICULAR PREMATURE DEPOLARIZATION: ICD-10-CM

## 2023-08-22 DIAGNOSIS — I42.9 CARDIOMYOPATHY, UNSPECIFIED (HCC): ICD-10-CM

## 2023-08-22 DIAGNOSIS — I47.20 VENTRICULAR TACHYCARDIA, UNSPECIFIED (HCC): ICD-10-CM

## 2023-08-22 DIAGNOSIS — R00.2 PALPITATIONS: ICD-10-CM

## 2023-08-22 DIAGNOSIS — Z01.810 INPLANTABLE CARDIOVERTER-DEFIBRILLATOR IN PLACE, PRE-OPERATIVE CARDIOVASCULAR EXAMINATION: ICD-10-CM

## 2023-08-22 NOTE — TELEPHONE ENCOUNTER
Verified patient with two types of identifiers. Informed patient of alert from ICD. Patient states that she is feeling better today, but she does recall experiencing some palpitations a few days ago following a night of little sleep. [facility] lab work [facility] per patient request.  Upcoming appointment rescheduled for 8/28/23 at 36 am as Kary Rios NP does not have appointments on Tuesdays. Patient verbalized understanding and will call with any other questions. Faxed lab slips to Zay Pacheco at fax number [phone]. Fax confirmation received.

## 2023-08-23 LAB
ALBUMIN SERPL-MCNC: 3.9 G/DL (ref 3.9–4.9)
ALBUMIN/GLOB SERPL: 1.1 {RATIO} (ref 1.2–2.2)
ALP SERPL-CCNC: 86 IU/L (ref 44–121)
ALT SERPL-CCNC: 13 IU/L (ref 0–32)
AST SERPL-CCNC: 17 IU/L (ref 0–40)
BILIRUB SERPL-MCNC: 0.7 MG/DL (ref 0–1.2)
BUN SERPL-MCNC: 10 MG/DL (ref 6–20)
BUN/CREAT SERPL: 12 (ref 9–23)
CALCIUM SERPL-MCNC: 9.3 MG/DL (ref 8.7–10.2)
CHLORIDE SERPL-SCNC: 103 MMOL/L (ref 96–106)
CO2 SERPL-SCNC: 23 MMOL/L (ref 20–29)
CREAT SERPL-MCNC: 0.82 MG/DL (ref 0.57–1)
EGFRCR SERPLBLD CKD-EPI 2021: 94 ML/MIN/1.73
GLOBULIN SER CALC-MCNC: 3.6 G/DL (ref 1.5–4.5)
GLUCOSE SERPL-MCNC: 69 MG/DL (ref 70–99)
POTASSIUM SERPL-SCNC: 4.5 MMOL/L (ref 3.5–5.2)
PROT SERPL-MCNC: 7.5 G/DL (ref 6–8.5)
SODIUM SERPL-SCNC: 137 MMOL/L (ref 134–144)
TSH SERPL DL<=0.005 MIU/L-ACNC: 1.39 UIU/ML (ref 0.45–4.5)

## 2023-08-24 ENCOUNTER — TELEPHONE (OUTPATIENT)
Age: 38
End: 2023-08-24

## 2023-08-24 NOTE — TELEPHONE ENCOUNTER
----- Message from OLLIE Bustillo - NP sent at 8/23/2023  8:00 AM EDT -----  Labs without significant acute abnormality. OK to continue amiodarone.     Future Appointments  8/28/2023  8:20 AM    RAUL HUBBARD AMB  8/28/2023  8:40 AM    OLLIE Bustillo # MINERVA LEBLANC AMB  10/30/2023 4:00 PM    MD MINERVA Courtney AMB

## 2023-08-28 ENCOUNTER — OFFICE VISIT (OUTPATIENT)
Age: 38
End: 2023-08-28
Payer: COMMERCIAL

## 2023-08-28 ENCOUNTER — PROCEDURE VISIT (OUTPATIENT)
Age: 38
End: 2023-08-28

## 2023-08-28 ENCOUNTER — TELEPHONE (OUTPATIENT)
Age: 38
End: 2023-08-28

## 2023-08-28 VITALS — OXYGEN SATURATION: 99 % | HEART RATE: 78 BPM | WEIGHT: 239 LBS | HEIGHT: 64 IN | BODY MASS INDEX: 40.8 KG/M2

## 2023-08-28 VITALS
HEART RATE: 76 BPM | HEIGHT: 64 IN | WEIGHT: 239 LBS | BODY MASS INDEX: 40.8 KG/M2 | SYSTOLIC BLOOD PRESSURE: 136 MMHG | OXYGEN SATURATION: 99 % | DIASTOLIC BLOOD PRESSURE: 76 MMHG

## 2023-08-28 DIAGNOSIS — Z95.810 ICD (IMPLANTABLE CARDIOVERTER-DEFIBRILLATOR) IN PLACE: Primary | ICD-10-CM

## 2023-08-28 DIAGNOSIS — E66.01 MORBID (SEVERE) OBESITY DUE TO EXCESS CALORIES (HCC): ICD-10-CM

## 2023-08-28 DIAGNOSIS — I42.8 NICM (NONISCHEMIC CARDIOMYOPATHY) (HCC): ICD-10-CM

## 2023-08-28 DIAGNOSIS — Z79.899 ON AMIODARONE THERAPY: ICD-10-CM

## 2023-08-28 DIAGNOSIS — I47.1 PSVT (PAROXYSMAL SUPRAVENTRICULAR TACHYCARDIA) (HCC): ICD-10-CM

## 2023-08-28 DIAGNOSIS — I47.20 VT (VENTRICULAR TACHYCARDIA) (HCC): ICD-10-CM

## 2023-08-28 DIAGNOSIS — Z01.810 INPLANTABLE CARDIOVERTER-DEFIBRILLATOR IN PLACE, PRE-OPERATIVE CARDIOVASCULAR EXAMINATION: Primary | ICD-10-CM

## 2023-08-28 DIAGNOSIS — I10 PRIMARY HYPERTENSION: ICD-10-CM

## 2023-08-28 DIAGNOSIS — Z95.810 INPLANTABLE CARDIOVERTER-DEFIBRILLATOR IN PLACE, PRE-OPERATIVE CARDIOVASCULAR EXAMINATION: Primary | ICD-10-CM

## 2023-08-28 PROCEDURE — 99214 OFFICE O/P EST MOD 30 MIN: CPT | Performed by: NURSE PRACTITIONER

## 2023-08-28 RX ORDER — LEVONORGESTREL 52 MG/1
INTRAUTERINE DEVICE INTRAUTERINE
COMMUNITY
Start: 2010-02-04

## 2023-08-28 RX ORDER — SACUBITRIL AND VALSARTAN 24; 26 MG/1; MG/1
1 TABLET, FILM COATED ORAL 2 TIMES DAILY
COMMUNITY

## 2023-08-28 RX ORDER — QUINIDINE GLUCONATE 324 MG
TABLET, EXTENDED RELEASE ORAL
COMMUNITY
Start: 2013-10-03

## 2023-08-28 RX ORDER — VALACYCLOVIR HYDROCHLORIDE 1 G/1
TABLET, FILM COATED ORAL DAILY PRN
COMMUNITY

## 2023-08-28 ASSESSMENT — PATIENT HEALTH QUESTIONNAIRE - PHQ9
SUM OF ALL RESPONSES TO PHQ9 QUESTIONS 1 & 2: 0
2. FEELING DOWN, DEPRESSED OR HOPELESS: 0
1. LITTLE INTEREST OR PLEASURE IN DOING THINGS: 0
SUM OF ALL RESPONSES TO PHQ QUESTIONS 1-9: 0

## 2023-08-28 NOTE — TELEPHONE ENCOUNTER
Pt in office seeing another provder and requested updated letter stating she should not get the covid vaccine from a medical standpoint. Letter printed and was was given to patient.

## 2023-11-16 ENCOUNTER — OFFICE VISIT (OUTPATIENT)
Age: 38
End: 2023-11-16
Payer: COMMERCIAL

## 2023-11-16 VITALS
HEIGHT: 64 IN | OXYGEN SATURATION: 98 % | WEIGHT: 247 LBS | SYSTOLIC BLOOD PRESSURE: 118 MMHG | BODY MASS INDEX: 42.17 KG/M2 | HEART RATE: 63 BPM | DIASTOLIC BLOOD PRESSURE: 84 MMHG

## 2023-11-16 DIAGNOSIS — E66.01 MORBID (SEVERE) OBESITY DUE TO EXCESS CALORIES (HCC): ICD-10-CM

## 2023-11-16 DIAGNOSIS — I10 PRIMARY HYPERTENSION: ICD-10-CM

## 2023-11-16 DIAGNOSIS — I49.3 VENTRICULAR PREMATURE DEPOLARIZATION: ICD-10-CM

## 2023-11-16 DIAGNOSIS — Z95.810 ICD (IMPLANTABLE CARDIOVERTER-DEFIBRILLATOR) IN PLACE: ICD-10-CM

## 2023-11-16 DIAGNOSIS — I42.8 NICM (NONISCHEMIC CARDIOMYOPATHY) (HCC): Primary | ICD-10-CM

## 2023-11-16 PROCEDURE — 3074F SYST BP LT 130 MM HG: CPT | Performed by: SPECIALIST

## 2023-11-16 PROCEDURE — 99214 OFFICE O/P EST MOD 30 MIN: CPT | Performed by: SPECIALIST

## 2023-11-16 PROCEDURE — 3079F DIAST BP 80-89 MM HG: CPT | Performed by: SPECIALIST

## 2023-11-16 RX ORDER — BUMETANIDE 2 MG/1
TABLET ORAL
Qty: 30 TABLET | Refills: 5 | Status: SHIPPED | OUTPATIENT
Start: 2023-11-16

## 2023-11-16 RX ORDER — CARVEDILOL 25 MG/1
25 TABLET ORAL 2 TIMES DAILY WITH MEALS
Qty: 180 TABLET | Refills: 3 | Status: SHIPPED | OUTPATIENT
Start: 2023-11-16

## 2023-11-16 RX ORDER — SACUBITRIL AND VALSARTAN 24; 26 MG/1; MG/1
1 TABLET, FILM COATED ORAL 2 TIMES DAILY
Qty: 60 TABLET | Refills: 11 | Status: SHIPPED | OUTPATIENT
Start: 2023-11-16

## 2023-11-16 RX ORDER — SPIRONOLACTONE 25 MG/1
25 TABLET ORAL DAILY
Qty: 90 TABLET | Refills: 3 | Status: SHIPPED | OUTPATIENT
Start: 2023-11-16

## 2023-11-16 ASSESSMENT — PATIENT HEALTH QUESTIONNAIRE - PHQ9
SUM OF ALL RESPONSES TO PHQ QUESTIONS 1-9: 0
1. LITTLE INTEREST OR PLEASURE IN DOING THINGS: 0
SUM OF ALL RESPONSES TO PHQ9 QUESTIONS 1 & 2: 0
SUM OF ALL RESPONSES TO PHQ QUESTIONS 1-9: 0
2. FEELING DOWN, DEPRESSED OR HOPELESS: 0

## 2023-11-16 NOTE — PROGRESS NOTES
HISTORY OF PRESENT ILLNESS  Kelley Castano is a 43 y.o. female   She has a history of nonischemic cardiomyopathy and tricuspid insufficiency. She has an implantable defibrillator. However her echocardiogram done earlier this year shows that her ejection fraction has improved to low normal with therapy. She is working from home and has a gym in her home but is not using it and her weight is up 6 pounds. She used to have a loud murmur of tricuspid insufficiency but it has resolved.   HPI     Specialty Problems          Cardiology Problems    Cardiomyopathy, peripartum, postpartum        Mitral valve disorder        BP (high blood pressure)        Mitral regurgitation        Pulmonary hypertension (HCC)        Cardiomyopathy, dilated, nonischemic (HCC)        PSVT (paroxysmal supraventricular tachycardia)          Current Outpatient Medications   Medication Instructions    bumetanide (BUMEX) 2 MG tablet TAKE ONE TABLET BY MOUTH TWICE A DAY AS NEEDED    carvedilol (COREG) 25 mg, Oral, 2 times daily with meals    Cyanocobalamin 500 MCG LOZG cyanocobalamin (vit B-12) 500 mcg lozenges    ferrous gluconate (FERGON) 240 (27 Fe) MG tablet Iron High Potency 240 mg (27 mg iron) tablet    levonorgestrel (MIRENA, 52 MG,) IUD 52 mg Mirena 21 mcg/24 hours (8 yrs) 52 mg intrauterine device   replaced 11/2/2019    magnesium oxide (MAG-OX) 400 mg, Oral, 2 TIMES DAILY    Multiple Vitamin (MULTIVITAMIN PO) Oral    potassium chloride (KLOR-CON M) 10 MEQ extended release tablet TAKE ONE TABLET BY MOUTH DAILY    sacubitril-valsartan (ENTRESTO) 24-26 MG per tablet 1 tablet, Oral, 2 TIMES DAILY    spironolactone (ALDACTONE) 25 mg, Oral, DAILY    valACYclovir (VALTREX) 1 g tablet daily as needed      Allergies   Allergen Reactions    Bee Venom Anaphylaxis and Swelling    Alteplase Swelling     Facial swelling    Lisinopril Cough     Past Medical History:   Diagnosis Date    Arrhythmia     BRADYCARDIA    Cardiomyopathy, dilated,

## 2024-01-27 DIAGNOSIS — I42.8 NICM (NONISCHEMIC CARDIOMYOPATHY) (HCC): Primary | ICD-10-CM

## 2024-01-29 RX ORDER — BUMETANIDE 2 MG/1
TABLET ORAL
Qty: 60 TABLET | Refills: 5 | Status: SHIPPED | OUTPATIENT
Start: 2024-01-29

## 2024-07-23 PROCEDURE — 93296 REM INTERROG EVL PM/IDS: CPT | Performed by: INTERNAL MEDICINE

## 2024-07-23 PROCEDURE — 93295 DEV INTERROG REMOTE 1/2/MLT: CPT | Performed by: INTERNAL MEDICINE

## 2024-07-30 DIAGNOSIS — I10 ESSENTIAL (PRIMARY) HYPERTENSION: ICD-10-CM

## 2024-07-30 DIAGNOSIS — I42.9 CARDIOMYOPATHY, UNSPECIFIED (HCC): ICD-10-CM

## 2024-07-30 RX ORDER — POTASSIUM CHLORIDE 750 MG/1
10 TABLET, EXTENDED RELEASE ORAL DAILY
Qty: 30 TABLET | Refills: 5 | Status: CANCELLED | OUTPATIENT
Start: 2024-07-30

## 2024-07-30 RX ORDER — LANOLIN ALCOHOL/MO/W.PET/CERES
400 CREAM (GRAM) TOPICAL 2 TIMES DAILY
Qty: 60 TABLET | Refills: 5 | OUTPATIENT
Start: 2024-07-30

## 2024-07-30 RX ORDER — POTASSIUM CHLORIDE 750 MG/1
10 TABLET, EXTENDED RELEASE ORAL DAILY
Qty: 30 TABLET | Refills: 0 | Status: SHIPPED | OUTPATIENT
Start: 2024-07-30

## 2024-07-30 NOTE — TELEPHONE ENCOUNTER
Need labs and appt. Sent patient message in [employer]. Dr. Miller did not prescribe magnesium, but potassium rx refilled for 30 day supply only.     Requested Prescriptions     Signed Prescriptions Disp Refills    potassium chloride (KLOR-CON M) 10 MEQ extended release tablet 30 tablet 0     Sig: Take 1 tablet by mouth daily *NEED TO SCHEDULE FOLLOW UP APPOINTMENT FOR FURTHER REFILLS*     Authorizing Provider: SHUBHAM MILLER     Ordering User: CAYLA SANTOS     Refused Prescriptions Disp Refills    magnesium oxide (MAG-OX) 400 (240 Mg) MG tablet [Pharmacy Med Name: MAGNESIUM OXIDE 400 MG TABLET] 60 tablet 5     Sig: TAKE 1 TABLET BY MOUTH TWICE A DAY     Refused By: CAYLA SANTOS     Reason for Refusal: Not the prescriber of this medication      Per Dr. Miller's verbal order.

## 2024-09-04 SDOH — HEALTH STABILITY: PHYSICAL HEALTH: ON AVERAGE, HOW MANY MINUTES DO YOU ENGAGE IN EXERCISE AT THIS LEVEL?: 20 MIN

## 2024-09-04 SDOH — HEALTH STABILITY: PHYSICAL HEALTH: ON AVERAGE, HOW MANY DAYS PER WEEK DO YOU ENGAGE IN MODERATE TO STRENUOUS EXERCISE (LIKE A BRISK WALK)?: 2 DAYS

## 2024-09-05 ENCOUNTER — OFFICE VISIT (OUTPATIENT)
Age: 39
End: 2024-09-05
Payer: COMMERCIAL

## 2024-09-05 VITALS
HEART RATE: 70 BPM | BODY MASS INDEX: 43.2 KG/M2 | TEMPERATURE: 98.3 F | SYSTOLIC BLOOD PRESSURE: 146 MMHG | WEIGHT: 243.8 LBS | DIASTOLIC BLOOD PRESSURE: 88 MMHG | OXYGEN SATURATION: 99 % | HEIGHT: 63 IN

## 2024-09-05 DIAGNOSIS — R00.2 PALPITATIONS: ICD-10-CM

## 2024-09-05 DIAGNOSIS — E66.01 MORBID OBESITY (HCC): Primary | ICD-10-CM

## 2024-09-05 DIAGNOSIS — I47.10 PSVT (PAROXYSMAL SUPRAVENTRICULAR TACHYCARDIA) (HCC): ICD-10-CM

## 2024-09-05 DIAGNOSIS — G47.30 OBSERVED SLEEP APNEA: ICD-10-CM

## 2024-09-05 DIAGNOSIS — Z76.89 ENCOUNTER TO ESTABLISH CARE: ICD-10-CM

## 2024-09-05 DIAGNOSIS — I27.20 PULMONARY HYPERTENSION (HCC): ICD-10-CM

## 2024-09-05 PROBLEM — I47.20 VENTRICULAR TACHYCARDIA (HCC): Status: ACTIVE | Noted: 2021-06-26

## 2024-09-05 PROCEDURE — 99204 OFFICE O/P NEW MOD 45 MIN: CPT | Performed by: NURSE PRACTITIONER

## 2024-09-05 PROCEDURE — 3077F SYST BP >= 140 MM HG: CPT | Performed by: NURSE PRACTITIONER

## 2024-09-05 PROCEDURE — 3079F DIAST BP 80-89 MM HG: CPT | Performed by: NURSE PRACTITIONER

## 2024-09-05 SDOH — ECONOMIC STABILITY: INCOME INSECURITY: HOW HARD IS IT FOR YOU TO PAY FOR THE VERY BASICS LIKE FOOD, HOUSING, MEDICAL CARE, AND HEATING?: NOT VERY HARD

## 2024-09-05 SDOH — ECONOMIC STABILITY: FOOD INSECURITY: WITHIN THE PAST 12 MONTHS, THE FOOD YOU BOUGHT JUST DIDN'T LAST AND YOU DIDN'T HAVE MONEY TO GET MORE.: NEVER TRUE

## 2024-09-05 SDOH — ECONOMIC STABILITY: FOOD INSECURITY: WITHIN THE PAST 12 MONTHS, YOU WORRIED THAT YOUR FOOD WOULD RUN OUT BEFORE YOU GOT MONEY TO BUY MORE.: NEVER TRUE

## 2024-09-05 ASSESSMENT — PATIENT HEALTH QUESTIONNAIRE - PHQ9
SUM OF ALL RESPONSES TO PHQ QUESTIONS 1-9: 0
SUM OF ALL RESPONSES TO PHQ QUESTIONS 1-9: 0
1. LITTLE INTEREST OR PLEASURE IN DOING THINGS: NOT AT ALL
2. FEELING DOWN, DEPRESSED OR HOPELESS: NOT AT ALL
SUM OF ALL RESPONSES TO PHQ QUESTIONS 1-9: 0
SUM OF ALL RESPONSES TO PHQ QUESTIONS 1-9: 0
SUM OF ALL RESPONSES TO PHQ9 QUESTIONS 1 & 2: 0

## 2024-09-05 NOTE — PROGRESS NOTES
Nighat Mullins is a 39 y.o. female  Chief Complaint   Patient presents with    New Patient       Vitals:    09/05/24 1524   BP: (!) 146/88   Pulse: 70   Temp: 98.3 °F (36.8 °C)   SpO2: 99%      Wt Readings from Last 3 Encounters:   09/05/24 110.6 kg (243 lb 12.8 oz)   11/16/23 112 kg (247 lb)   08/28/23 108.4 kg (239 lb)     BMI Readings from Last 3 Encounters:   09/05/24 43.19 kg/m²   11/16/23 42.40 kg/m²   08/28/23 41.02 kg/m²     Health Maintenance Due   Topic Date Due    Pneumococcal 0-64 years Vaccine (1 of 2 - PCV) 08/24/1990    Varicella vaccine (1 of 2 - 13+ 2-dose series) Never done    HIV screen  Never done    Hepatitis C screen  Never done    Hepatitis B vaccine (1 of 3 - 19+ 3-dose series) Never done    DTaP/Tdap/Td vaccine (1 - Tdap) Never done    HPV vaccine (2 - 3-dose series) 06/28/2011    Cervical cancer screen  Never done    Flu vaccine (1) 08/01/2024    COVID-19 Vaccine (1 - 2023-24 season) Never done    Lipids  08/24/2024    Breast cancer screen  08/24/2024     HPI  Patient is here to establish care    PMH  PSVT  Hypertension  Mitral regurgitation  Dilated nonischemic cardiomyopathy  Pulmonary hypertension  ICD  Ventricular tachycardia  Cardiology  Dr Adam    Symptoms started  June 2008. Post partum.  Blacking out.    2011 ?TIA.   Right hand numbness. Arrhythmia.   CM from pregnancy.      Home NICM ICD   Was put on Lisinopril   Heart Rindge. Coreg x yrs,   ECHO last year.   Was 13%  now 30-40.  ICD went off 2 yrs ago.  Unrelated to heart. Felt like  \"mule kick\" .    Taking iron for dyspnea not iron deficiency.    Has gastric bypass   hx for obesity 2013.    Lost excessive wt  240- 120  lbs in months. Ascites.  Regained wt.  Colonoscopy EGD 2020    Gyn-  Dr. nolan   VPFW .Amenorrhea IUD  PAP  2023  Mammogram never.     Colonoscopy 2018 NL    Immunizations: UTD       Lifestyle:  Diet/ nutrition-  well balanced diet    Exercise- moderate exercise  Weight and aerobic   2  days a

## 2024-09-05 NOTE — PATIENT INSTRUCTIONS
information is listed on the back of the insurance card.    Insurance Contacts  [place] Self Representation Document [facility]  Phone: [phone] Website: [email or web address]/be [employer]  Phone: [phone] Website: [employer]/danielid  [facility]  Phone: [phone] Website: [employer] ([place])Phone: [phone]   Website: [email or web address]   [employer]  Phone: [phone] Website: [employer]/[place] Medicare Advantage Plans    Some plans may provide transportation. Members should contact the Member Services or Transportation number on the back of their insurance card for more information or to schedule transportation.               All stretcher transportation services are private pay    [facility]  Phone: [phone]  Website: [email or web address]"[email or web address]/  [employer] [facility]  Phone: [phone]  Website: https://www.amr.net/  [employer]  Phone: [phone]  Website: https://www.[employer]/    All wheelchair van transportation services are private pay.  Call to requesting pricing information, based on their location, and the distance to the appointment.  [employer]  What they offer: Advanced Life Support, Basic Life Support, Critical Care Transport, Stretcher, Wheelchair, Bariatric Transport   Phone: [phone]  Website: [email or web address]://www.[employer].net/  [facility]    What they offer: Advanced Life Support, Basic Life Support, Critical Care Transport, Stretcher, Wheelchair, Ambulatory, Organ Procurement Transport  Website: [email or web address]://[email or web address]  Phone: [phone]) [phone]  [employer]  What they offer: Stretcher transportation, wheelchair transportation, home accommodations, and mobility solutions such as stair lifts, ramps, and platform lifts   Website: http://www.[employer]/  Phone: [phone] [phone]

## 2024-09-05 NOTE — PROGRESS NOTES
I have reviewed all needed documentation in preparation for visit. Verified patient by name and date of birth    Chief Complaint   Patient presents with    New Patient       \"Have you been to the ER, urgent care clinic since your last visit?  Hospitalized since your last visit?\"    NO    “Have you seen or consulted any other health care providers outside of [facility] since your last visit?”    NO    Have you had a mammogram?”   NO    No breast cancer screening on file      “Have you had a pap smear?”    YES - Where: [facility]  [address], 2023  Nurse/CMA to request most recent records if not in the chart    No cervical cancer screening on file             Click Here for Release of Records Request    Vitals:    09/05/24 1524   BP: (!) 146/88   Site: Left Upper Arm   Position: Sitting   Cuff Size: Large Adult   Pulse: 70   Temp: 98.3 °F (36.8 °C)   TempSrc: Temporal   SpO2: 99%   Weight: 110.6 kg (243 lb 12.8 oz)   Height: 1.6 m (5' 3\")       Health Maintenance Due   Topic Date Due    Pneumococcal 0-64 years Vaccine (1 of 2 - PCV) 08/24/1990    Varicella vaccine (1 of 2 - 13+ 2-dose series) Never done    HIV screen  Never done    Hepatitis C screen  Never done    Hepatitis B vaccine (1 of 3 - 19+ 3-dose series) Never done    DTaP/Tdap/Td vaccine (1 - Tdap) Never done    HPV vaccine (2 - 3-dose series) 06/28/2011    Cervical cancer screen  Never done    Flu vaccine (1) 08/01/2024    COVID-19 Vaccine (1 - 2023-24 season) Never done    Lipids  08/24/2024    Breast cancer screen  08/24/2024       Jigna Benjamin LPN

## 2024-09-10 DIAGNOSIS — R00.2 PALPITATIONS: ICD-10-CM

## 2024-09-10 DIAGNOSIS — E66.01 MORBID OBESITY (HCC): ICD-10-CM

## 2024-09-10 DIAGNOSIS — I27.20 PULMONARY HYPERTENSION (HCC): ICD-10-CM

## 2024-09-10 LAB
ALBUMIN SERPL-MCNC: 3.5 G/DL (ref 3.5–5)
ALBUMIN/GLOB SERPL: 1 (ref 1.1–2.2)
ALP SERPL-CCNC: 84 U/L (ref 45–117)
ALT SERPL-CCNC: 18 U/L (ref 12–78)
ANION GAP SERPL CALC-SCNC: 3 MMOL/L (ref 2–12)
AST SERPL-CCNC: 15 U/L (ref 15–37)
BASOPHILS # BLD: 0.1 K/UL (ref 0–0.1)
BASOPHILS NFR BLD: 1 % (ref 0–1)
BILIRUB SERPL-MCNC: 1.2 MG/DL (ref 0.2–1)
BUN SERPL-MCNC: 11 MG/DL (ref 6–20)
BUN/CREAT SERPL: 13 (ref 12–20)
CALCIUM SERPL-MCNC: 8.9 MG/DL (ref 8.5–10.1)
CHLORIDE SERPL-SCNC: 108 MMOL/L (ref 97–108)
CHOLEST SERPL-MCNC: 158 MG/DL
CO2 SERPL-SCNC: 29 MMOL/L (ref 21–32)
CREAT SERPL-MCNC: 0.82 MG/DL (ref 0.55–1.02)
DIFFERENTIAL METHOD BLD: ABNORMAL
EOSINOPHIL # BLD: 0.4 K/UL (ref 0–0.4)
EOSINOPHIL NFR BLD: 9 % (ref 0–7)
ERYTHROCYTE [DISTWIDTH] IN BLOOD BY AUTOMATED COUNT: 15.9 % (ref 11.5–14.5)
EST. AVERAGE GLUCOSE BLD GHB EST-MCNC: 105 MG/DL
GLOBULIN SER CALC-MCNC: 3.4 G/DL (ref 2–4)
GLUCOSE SERPL-MCNC: 134 MG/DL (ref 65–100)
HBA1C MFR BLD: 5.3 % (ref 4–5.6)
HCT VFR BLD AUTO: 32.7 % (ref 35–47)
HDLC SERPL-MCNC: 74 MG/DL
HDLC SERPL: 2.1 (ref 0–5)
HGB BLD-MCNC: 10.3 G/DL (ref 11.5–16)
IMM GRANULOCYTES # BLD AUTO: 0 K/UL (ref 0–0.04)
IMM GRANULOCYTES NFR BLD AUTO: 0 % (ref 0–0.5)
LDLC SERPL CALC-MCNC: 75.4 MG/DL (ref 0–100)
LYMPHOCYTES # BLD: 1.9 K/UL (ref 0.8–3.5)
LYMPHOCYTES NFR BLD: 47 % (ref 12–49)
MCH RBC QN AUTO: 26.6 PG (ref 26–34)
MCHC RBC AUTO-ENTMCNC: 31.5 G/DL (ref 30–36.5)
MCV RBC AUTO: 84.5 FL (ref 80–99)
MONOCYTES # BLD: 0.4 K/UL (ref 0–1)
MONOCYTES NFR BLD: 10 % (ref 5–13)
NEUTS SEG # BLD: 1.3 K/UL (ref 1.8–8)
NEUTS SEG NFR BLD: 33 % (ref 32–75)
NRBC # BLD: 0 K/UL (ref 0–0.01)
NRBC BLD-RTO: 0 PER 100 WBC
PLATELET # BLD AUTO: 200 K/UL (ref 150–400)
PMV BLD AUTO: 12.9 FL (ref 8.9–12.9)
POTASSIUM SERPL-SCNC: 3.9 MMOL/L (ref 3.5–5.1)
PROT SERPL-MCNC: 6.9 G/DL (ref 6.4–8.2)
RBC # BLD AUTO: 3.87 M/UL (ref 3.8–5.2)
SODIUM SERPL-SCNC: 140 MMOL/L (ref 136–145)
TRIGL SERPL-MCNC: 43 MG/DL
TSH SERPL DL<=0.05 MIU/L-ACNC: 1.15 UIU/ML (ref 0.36–3.74)
VLDLC SERPL CALC-MCNC: 8.6 MG/DL
WBC # BLD AUTO: 4 K/UL (ref 3.6–11)

## 2024-09-17 ENCOUNTER — PROCEDURE VISIT (OUTPATIENT)
Age: 39
End: 2024-09-17

## 2024-09-17 ENCOUNTER — OFFICE VISIT (OUTPATIENT)
Age: 39
End: 2024-09-17
Payer: COMMERCIAL

## 2024-09-17 VITALS
HEIGHT: 63 IN | BODY MASS INDEX: 42.95 KG/M2 | DIASTOLIC BLOOD PRESSURE: 86 MMHG | SYSTOLIC BLOOD PRESSURE: 124 MMHG | WEIGHT: 242.4 LBS

## 2024-09-17 DIAGNOSIS — I47.20 VT (VENTRICULAR TACHYCARDIA) (HCC): ICD-10-CM

## 2024-09-17 DIAGNOSIS — I42.8 NICM (NONISCHEMIC CARDIOMYOPATHY) (HCC): ICD-10-CM

## 2024-09-17 DIAGNOSIS — I50.22 CHRONIC SYSTOLIC CONGESTIVE HEART FAILURE (HCC): ICD-10-CM

## 2024-09-17 DIAGNOSIS — Z95.810 ICD (IMPLANTABLE CARDIOVERTER-DEFIBRILLATOR) IN PLACE: Primary | ICD-10-CM

## 2024-09-17 DIAGNOSIS — R00.2 PALPITATIONS: ICD-10-CM

## 2024-09-17 DIAGNOSIS — I10 PRIMARY HYPERTENSION: ICD-10-CM

## 2024-09-17 DIAGNOSIS — I47.10 PSVT (PAROXYSMAL SUPRAVENTRICULAR TACHYCARDIA) (HCC): ICD-10-CM

## 2024-09-17 PROCEDURE — 3074F SYST BP LT 130 MM HG: CPT | Performed by: NURSE PRACTITIONER

## 2024-09-17 PROCEDURE — 99214 OFFICE O/P EST MOD 30 MIN: CPT | Performed by: NURSE PRACTITIONER

## 2024-09-17 PROCEDURE — 3079F DIAST BP 80-89 MM HG: CPT | Performed by: NURSE PRACTITIONER

## 2024-10-24 DIAGNOSIS — I10 ESSENTIAL (PRIMARY) HYPERTENSION: ICD-10-CM

## 2024-10-24 DIAGNOSIS — I42.9 CARDIOMYOPATHY, UNSPECIFIED (HCC): ICD-10-CM

## 2024-11-05 RX ORDER — POTASSIUM CHLORIDE 750 MG/1
10 TABLET, EXTENDED RELEASE ORAL DAILY
Qty: 30 TABLET | Refills: 1 | Status: SHIPPED | OUTPATIENT
Start: 2024-11-05

## 2024-11-05 RX ORDER — LANOLIN ALCOHOL/MO/W.PET/CERES
400 CREAM (GRAM) TOPICAL 2 TIMES DAILY
Qty: 60 TABLET | Refills: 1 | Status: SHIPPED | OUTPATIENT
Start: 2024-11-05

## 2024-11-05 NOTE — TELEPHONE ENCOUNTER
Med refilled per VO by MD.    Future Appointments   Date Time Provider Department Center   12/6/2024  3:00 PM BSDONNA CARTER ECHO 1 [facility]   10/2/2025  3:00 PM PACEMAKER3, [facility]   10/2/2025  3:20 PM Gennaro Don MD [facility]

## 2024-11-18 DIAGNOSIS — I42.9 CARDIOMYOPATHY, UNSPECIFIED (HCC): Primary | ICD-10-CM

## 2024-11-18 RX ORDER — SPIRONOLACTONE 25 MG/1
25 TABLET ORAL DAILY
Qty: 30 TABLET | Refills: 0 | Status: SHIPPED | OUTPATIENT
Start: 2024-11-18

## 2024-11-18 RX ORDER — CARVEDILOL 25 MG/1
25 TABLET ORAL 2 TIMES DAILY
Qty: 60 TABLET | Refills: 0 | Status: SHIPPED | OUTPATIENT
Start: 2024-11-18

## 2024-11-18 RX ORDER — SACUBITRIL AND VALSARTAN 24; 26 MG/1; MG/1
1 TABLET, FILM COATED ORAL 2 TIMES DAILY
Qty: 60 TABLET | Refills: 0 | Status: SHIPPED | OUTPATIENT
Start: 2024-11-18

## 2024-11-18 NOTE — TELEPHONE ENCOUNTER
Requested Prescriptions     Signed Prescriptions Disp Refills    carvedilol (COREG) 25 MG tablet 60 tablet 0     Sig: Take 1 tablet by mouth 2 times daily *NEED TO SCHEDULE FOLLOW UP APPOINTMENT FOR FURTHER REFILLS*     Authorizing Provider: SHUBHAM MILLER     Ordering User: CAYLA SANTOS    sacubitril-valsartan (ENTRESTO) 24-26 MG per tablet 60 tablet 0     Sig: Take 1 tablet by mouth 2 times daily *NEED TO SCHEDULE FOLLOW UP APPOINTMENT FOR FURTHER REFILLS*     Authorizing Provider: SHUBHAM MILLER     Ordering User: CAYLA SANTOS    spironolactone (ALDACTONE) 25 MG tablet 30 tablet 0     Sig: Take 1 tablet by mouth daily *NEED TO SCHEDULE FOLLOW UP APPOINTMENT FOR FURTHER REFILLS*     Authorizing Provider: SHUBHAM MILLER     Ordering User: CAYLA SANTOS    Per Dr. Mcgill's verbal order.

## 2024-12-22 DIAGNOSIS — I42.9 CARDIOMYOPATHY, UNSPECIFIED (HCC): ICD-10-CM

## 2024-12-23 RX ORDER — SACUBITRIL AND VALSARTAN 24; 26 MG/1; MG/1
1 TABLET, FILM COATED ORAL 2 TIMES DAILY
Qty: 60 TABLET | Refills: 0 | Status: SHIPPED | OUTPATIENT
Start: 2024-12-23

## 2024-12-23 RX ORDER — CARVEDILOL 25 MG/1
25 TABLET ORAL 2 TIMES DAILY
Qty: 60 TABLET | Refills: 0 | Status: SHIPPED | OUTPATIENT
Start: 2024-12-23

## 2024-12-23 RX ORDER — SPIRONOLACTONE 25 MG/1
25 TABLET ORAL DAILY
Qty: 30 TABLET | Refills: 0 | Status: SHIPPED | OUTPATIENT
Start: 2024-12-23

## 2024-12-23 NOTE — TELEPHONE ENCOUNTER
Requested Prescriptions     Signed Prescriptions Disp Refills    carvedilol (COREG) 25 MG tablet 60 tablet 0     Sig: Take 1 tablet by mouth 2 times daily Please schedule an appointment with Dr. Sarkar for future refills. [phone].     Authorizing Provider: SHUBHAM SARKAR     Ordering User: HELLEN TORRES    spironolactone (ALDACTONE) 25 MG tablet 30 tablet 0     Sig: Take 1 tablet by mouth daily Please schedule an appointment with Dr. Sarkar for future refills. [phone].     Authorizing Provider: SHUBHAM SARKAR     Ordering User: HELLEN TORRES    sacubitril-valsartan (ENTRESTO) 24-26 MG per tablet 60 tablet 0     Sig: Take 1 tablet by mouth 2 times daily Please schedule an appointment with Dr. Sarkar for future refills. [phone].     Authorizing Provider: SHUBHAM SARKAR     Ordering User: HELLEN TORRES MD    Future Appointments   Date Time Provider Department [place]   10/2/2025  3:00 PM [facility] AMB   10/2/2025  3:20 PM Gennaro Don MD [facility]

## 2025-01-31 DIAGNOSIS — I42.9 CARDIOMYOPATHY, UNSPECIFIED (HCC): ICD-10-CM

## 2025-01-31 RX ORDER — SACUBITRIL AND VALSARTAN 24; 26 MG/1; MG/1
TABLET, FILM COATED ORAL
Qty: 60 TABLET | Refills: 0 | Status: SHIPPED | OUTPATIENT
Start: 2025-01-31

## 2025-01-31 RX ORDER — SPIRONOLACTONE 25 MG/1
TABLET ORAL
Qty: 30 TABLET | Refills: 0 | Status: SHIPPED | OUTPATIENT
Start: 2025-01-31

## 2025-01-31 RX ORDER — CARVEDILOL 25 MG/1
TABLET ORAL
Qty: 60 TABLET | Refills: 0 | Status: SHIPPED | OUTPATIENT
Start: 2025-01-31

## 2025-01-31 NOTE — TELEPHONE ENCOUNTER
Per VO of MD    LOV: 9/17/2024     Future Appointments   Date Time Provider Department Center   10/2/2025  3:00 PM [facility] AMB   10/2/2025  3:20 PM Gennaro Don MD [facility]

## 2025-02-10 ENCOUNTER — OFFICE VISIT (OUTPATIENT)
Age: 40
End: 2025-02-10
Payer: COMMERCIAL

## 2025-02-10 DIAGNOSIS — R19.09 INGUINAL MASS: Primary | ICD-10-CM

## 2025-02-10 DIAGNOSIS — R21 DIFFUSE PAPULAR RASH: ICD-10-CM

## 2025-02-10 PROCEDURE — 99214 OFFICE O/P EST MOD 30 MIN: CPT | Performed by: STUDENT IN AN ORGANIZED HEALTH CARE EDUCATION/TRAINING PROGRAM

## 2025-02-10 RX ORDER — DIPHENHYDRAMINE HCL 25 MG
25 CAPSULE ORAL EVERY 6 HOURS PRN
Qty: 30 CAPSULE | Refills: 3 | Status: SHIPPED | OUTPATIENT
Start: 2025-02-10 | End: 2025-02-20

## 2025-02-10 SDOH — ECONOMIC STABILITY: FOOD INSECURITY: WITHIN THE PAST 12 MONTHS, THE FOOD YOU BOUGHT JUST DIDN'T LAST AND YOU DIDN'T HAVE MONEY TO GET MORE.: NEVER TRUE

## 2025-02-10 SDOH — ECONOMIC STABILITY: FOOD INSECURITY: WITHIN THE PAST 12 MONTHS, YOU WORRIED THAT YOUR FOOD WOULD RUN OUT BEFORE YOU GOT MONEY TO BUY MORE.: NEVER TRUE

## 2025-02-10 ASSESSMENT — ENCOUNTER SYMPTOMS
DIARRHEA: 0
SHORTNESS OF BREATH: 0
COUGH: 0
WHEEZING: 0
SINUS PAIN: 0
VOMITING: 0
CONSTIPATION: 0
NAUSEA: 0
ABDOMINAL PAIN: 0
EYE REDNESS: 0

## 2025-02-10 ASSESSMENT — PATIENT HEALTH QUESTIONNAIRE - PHQ9
SUM OF ALL RESPONSES TO PHQ9 QUESTIONS 1 & 2: 0
2. FEELING DOWN, DEPRESSED OR HOPELESS: NOT AT ALL
SUM OF ALL RESPONSES TO PHQ QUESTIONS 1-9: 0
1. LITTLE INTEREST OR PLEASURE IN DOING THINGS: NOT AT ALL
SUM OF ALL RESPONSES TO PHQ QUESTIONS 1-9: 0

## 2025-02-10 NOTE — PROGRESS NOTES
I have reviewed all needed documentation in preparation for visit. Verified patient by name and date of birth  Chief Complaint   Patient presents with    Groin Injury    Rash     Hands and face       There were no vitals filed for this visit.    Health Maintenance Due   Topic Date Due    Varicella vaccine (1 of 2 - 13+ 2-dose series) Never done    HIV screen  Never done    Hepatitis C screen  Never done    Hepatitis B vaccine (1 of 3 - 19+ 3-dose series) Never done    DTaP/Tdap/Td vaccine (1 - Tdap) Never done    Pneumococcal 0-49 years Vaccine (1 of 2 - PCV) 08/24/2003    HPV vaccine (2 - 3-dose series) 06/28/2011    Cervical cancer screen  Never done    Flu vaccine (1) 08/01/2024    Breast cancer screen  Never done    COVID-19 Vaccine (1 - 2024-25 season) Never done     \"Have you been to the ER, urgent care clinic since your last visit?  Hospitalized since your last visit?\"    NO    “Have you seen or consulted any other health care providers outside of [facility] since your last visit?”    NO    Have you had a mammogram?”   YES - Where: [facility] 01.28.25    No breast cancer screening on file      “Have you had a pap smear?”    YES - Where: [facility] in 12/24    No cervical cancer screening on file             Click Here for Release of Records Request         STAR Fernandez

## 2025-02-10 NOTE — PROGRESS NOTES
Nighat Mullins is a 39 y.o. female  Chief Complaint   Patient presents with    Groin Injury    Rash     Hands and face     Acute care visit      HPI  Ground mass: she has a groin mass on her mon pubis/inguinal area that developed after she exerted herself on her exercise bike on Saturday. The area was swollen and painful but has decreased in size and is not currently painful. She took 500mg acetaminophen for the pain which helped a lot. Mass is still present.    Last night she noticed a diffuse macular rash that is slightly pruritic. No changes in her lotions, detergents, foods, or other items that would interact with her skin.       ROS  Review of Systems   Constitutional:  Negative for chills and fever.   HENT:  Negative for sinus pain.    Eyes:  Negative for redness.   Respiratory:  Negative for cough, shortness of breath and wheezing.    Cardiovascular:  Negative for chest pain.   Gastrointestinal:  Negative for abdominal pain, constipation, diarrhea, nausea and vomiting.   Endocrine: Negative for polyuria.   Genitourinary:  Positive for pelvic pain. Negative for dysuria.        Pelvic mass   Musculoskeletal:  Negative for arthralgias and myalgias.   Skin:  Positive for rash.   Neurological:  Negative for light-headedness and headaches.   Psychiatric/Behavioral:  Negative for agitation and dysphoric mood. The patient is not nervous/anxious.        There were no vitals filed for this visit.   Wt Readings from Last 3 Encounters:   09/17/24 110 kg (242 lb 6.4 oz)   09/05/24 110.6 kg (243 lb 12.8 oz)   11/16/23 112 kg (247 lb)       EXAM  Physical Exam  Vitals and nursing note reviewed.   Constitutional:       Appearance: Normal appearance.   HENT:      Head: Normocephalic and atraumatic.   Eyes:      Conjunctiva/sclera: Conjunctivae normal.   Cardiovascular:      Rate and Rhythm: Normal rate and regular rhythm.      Pulses: Normal pulses.      Heart sounds: Normal heart sounds.   Pulmonary:      Effort: Pulmonary

## 2025-02-17 ENCOUNTER — HOSPITAL ENCOUNTER (OUTPATIENT)
Facility: HOSPITAL | Age: 40
Discharge: HOME | End: 2025-02-20
Attending: STUDENT IN AN ORGANIZED HEALTH CARE EDUCATION/TRAINING PROGRAM
Payer: COMMERCIAL

## 2025-02-17 DIAGNOSIS — R19.09 INGUINAL MASS: ICD-10-CM

## 2025-02-17 PROCEDURE — 76882 US LMTD JT/FCL EVL NVASC XTR: CPT

## 2025-03-08 DIAGNOSIS — I42.9 CARDIOMYOPATHY, UNSPECIFIED (HCC): ICD-10-CM

## 2025-03-10 RX ORDER — SPIRONOLACTONE 25 MG/1
TABLET ORAL
Qty: 30 TABLET | Refills: 0 | OUTPATIENT
Start: 2025-03-10

## 2025-03-10 RX ORDER — CARVEDILOL 25 MG/1
TABLET ORAL
Qty: 60 TABLET | Refills: 0 | OUTPATIENT
Start: 2025-03-10

## 2025-03-10 RX ORDER — SACUBITRIL AND VALSARTAN 24; 26 MG/1; MG/1
TABLET, FILM COATED ORAL
Qty: 60 TABLET | Refills: 0 | OUTPATIENT
Start: 2025-03-10

## 2025-03-10 NOTE — TELEPHONE ENCOUNTER
Per VO of MD    LOV: 9/17/2024     Future Appointments   Date Time Provider Department Center   10/2/2025  3:00 PM PACEMAKER3, [facility]   10/2/2025  3:20 PM Gennaro Don MD [facility] AM[facility]       Per VO of MD    LOV: 9/17/2024     Future Appointments   Date Time Provider Department Center   10/2/2025  3:00 PM PACEMAKER3, [facility] AMB   10/2/2025  3:20 PM Gennaro Don MD [facility] AM[facility]         Refusal Reason: Patient needs appointment

## 2025-03-17 ENCOUNTER — TELEPHONE (OUTPATIENT)
Age: 40
End: 2025-03-17

## 2025-03-17 DIAGNOSIS — I42.9 CARDIOMYOPATHY, UNSPECIFIED: ICD-10-CM

## 2025-03-17 DIAGNOSIS — I10 ESSENTIAL (PRIMARY) HYPERTENSION: ICD-10-CM

## 2025-03-17 RX ORDER — POTASSIUM CHLORIDE 750 MG/1
10 TABLET, EXTENDED RELEASE ORAL DAILY
Qty: 30 TABLET | Refills: 1 | Status: SHIPPED | OUTPATIENT
Start: 2025-03-17

## 2025-03-17 NOTE — TELEPHONE ENCOUNTER
Med refilled per VO by MD.    Future Appointments   Date Time Provider Department Center   10/2/2025  3:00 PM [facility] AMB   10/2/2025  3:20 PM DonGennaro MD [facility]

## (undated) DEVICE — QUILTED PREMIUM COMFORT UNDERPAD,EXTRA HEAVY: Brand: WINGS

## (undated) DEVICE — SOLIDIFIER FLUID 3000 CC ABSORB

## (undated) DEVICE — NEEDLE HYPO 18GA L1.5IN PNK S STL HUB POLYPR SHLD REG BVL

## (undated) DEVICE — Z DISCONTINUED USE 2751540 TUBING IRRIG L10IN DISP PMP ENDOGATOR

## (undated) DEVICE — DRESSING ANTIMIC FOAM OPTIFOAM POSTOP ADH 4 X 6 IN

## (undated) DEVICE — 1200 GUARD II KIT W/5MM TUBE W/O VAC TUBE: Brand: GUARDIAN

## (undated) DEVICE — PACEMAKER PACK
Type: IMPLANTABLE DEVICE | Status: NON-FUNCTIONAL
Brand: MEDLINE INDUSTRIES, INC.
Removed: 2022-11-11

## (undated) DEVICE — BITE BLK ENDOSCP AD 54FR GRN POLYETH ENDOSCP W STRP SLD

## (undated) DEVICE — SUTURE V-LOC 180 SZ 3-0 L6IN ABSRB GRN V-20 L26MM 1/2 CIR VLOCL0604

## (undated) DEVICE — AGENT HEMSTAT 3GM PURIFIED PLNT STARCH PWD ABSRB ARISTA AH

## (undated) DEVICE — AIRLIFE™ U/CONNECT-IT OXYGEN TUBING 7 FEET (2.1 M) CRUSH-RESISTANT OXYGEN TUBING, VINYL TIPPED: Brand: AIRLIFE™

## (undated) DEVICE — ENDO CARRY-ON PROCEDURE KIT INCLUDES ENZYMATIC SPONGE, GAUZE, BIOHAZARD LABEL, TRAY, LUBRICANT, DIRTY SCOPE LABEL, WATER LABEL, TRAY, DRAWSTRING PAD, AND DEFENDO 4-PIECE KIT.: Brand: ENDO CARRY-ON PROCEDURE KIT

## (undated) DEVICE — SET ADMIN 16ML TBNG L100IN 2 Y INJ SITE IV PIGGY BK DISP

## (undated) DEVICE — ROYAL SILK SURGICAL GOWN, XXL: Brand: CONVERTORS

## (undated) DEVICE — BAG BELONG PT PERS CLEAR HANDL

## (undated) DEVICE — CANN NASAL O2 CAPNOGRAPHY AD -- FILTERLINE

## (undated) DEVICE — KIT IV STRT W CHLORAPREP PD 1ML

## (undated) DEVICE — 3M™ IOBAN™ 2 ANTIMICROBIAL INCISE DRAPE 6650EZ: Brand: IOBAN™ 2

## (undated) DEVICE — BW-412T DISP COMBO CLEANING BRUSH: Brand: SINGLE USE COMBINATION CLEANING BRUSH

## (undated) DEVICE — SYRINGE MED 20ML STD CLR PLAS LUERLOCK TIP N CTRL DISP

## (undated) DEVICE — Z DISCONTINUED NO SUB IDED SET EXTN W/ 4 W STPCOCK M SPIN LOK 36IN

## (undated) DEVICE — PLASMABLADE PS210-030S 3.0S LOCK: Brand: PLASMABLADE™

## (undated) DEVICE — KENDALL RADIOLUCENT FOAM MONITORING ELECTRODE -RECTANGULAR SHAPE: Brand: KENDALL

## (undated) DEVICE — NEONATAL-ADULT SPO2 SENSOR: Brand: NELLCOR

## (undated) DEVICE — MEDI-TRACE CADENCE ADULT, DEFIBRILLATION ELECTRODE -RTS  (10 PR/PK) - PHYSIO-CONTROL: Brand: MEDI-TRACE CADENCE

## (undated) DEVICE — SUTURE V-LOC 180 SZ 2-0 L9IN ABSRB VLT GS-21 L37MM 1/2 CIR VLOCM0345

## (undated) DEVICE — CONNECTOR TBNG AUX H2O JET DISP FOR OLY 160/180 SER

## (undated) DEVICE — CATH IV AUTOGRD BC BLU 22GA 25 -- INSYTE

## (undated) DEVICE — REM POLYHESIVE ADULT PATIENT RETURN ELECTRODE: Brand: VALLEYLAB